# Patient Record
Sex: FEMALE | Race: WHITE | HISPANIC OR LATINO | Employment: UNEMPLOYED
[De-identification: names, ages, dates, MRNs, and addresses within clinical notes are randomized per-mention and may not be internally consistent; named-entity substitution may affect disease eponyms.]

---

## 2018-03-11 ENCOUNTER — RECORDS - HEALTHEAST (OUTPATIENT)
Dept: ADMINISTRATIVE | Facility: OTHER | Age: 33
End: 2018-03-11

## 2019-03-11 LAB
ABORH_EXT (HISTORICAL CONVERSION): NORMAL
ANTIBODY_EXT (HISTORICAL CONVERSION): NEGATIVE
HBA1C MFR BLD: 4.6 % (ref 4.8–5.6)
HBSAG_EXT (HISTORICAL CONVERSION): NEGATIVE
HCV AB SERPL QL IA: NEGATIVE
HGB_EXT (HISTORICAL CONVERSION): 13
HIV_EXT: NORMAL
PLT_EXT - HISTORICAL: 275
RPR - HISTORICAL: NORMAL
RUBELLA_EXT (HISTORICAL CONVERSION): NORMAL

## 2019-03-18 ENCOUNTER — RECORDS - HEALTHEAST (OUTPATIENT)
Dept: ADMINISTRATIVE | Facility: OTHER | Age: 34
End: 2019-03-18

## 2019-03-21 ENCOUNTER — RECORDS - HEALTHEAST (OUTPATIENT)
Dept: ADMINISTRATIVE | Facility: OTHER | Age: 34
End: 2019-03-21

## 2019-03-21 LAB
CHLAMYDIA_EXT- HISTORICAL: NEGATIVE
PAP SMEAR - HIM PATIENT REPORTED: NORMAL
SPECIMEN DESCRIPTION_EXT (HISTORICAL CONVERSION): NORMAL

## 2019-04-09 ENCOUNTER — RECORDS - HEALTHEAST (OUTPATIENT)
Dept: ADMINISTRATIVE | Facility: OTHER | Age: 34
End: 2019-04-09

## 2019-04-11 ENCOUNTER — RECORDS - HEALTHEAST (OUTPATIENT)
Dept: ADMINISTRATIVE | Facility: OTHER | Age: 34
End: 2019-04-11

## 2019-04-30 ENCOUNTER — COMMUNICATION - HEALTHEAST (OUTPATIENT)
Dept: ADMINISTRATIVE | Facility: CLINIC | Age: 34
End: 2019-04-30

## 2019-05-02 ENCOUNTER — RECORDS - HEALTHEAST (OUTPATIENT)
Dept: ADMINISTRATIVE | Facility: OTHER | Age: 34
End: 2019-05-02

## 2019-05-09 ENCOUNTER — PRENATAL OFFICE VISIT - HEALTHEAST (OUTPATIENT)
Dept: MIDWIFE SERVICES | Facility: CLINIC | Age: 34
End: 2019-05-09

## 2019-05-09 DIAGNOSIS — E03.9 HYPOTHYROIDISM, UNSPECIFIED TYPE: ICD-10-CM

## 2019-05-09 DIAGNOSIS — J45.20 MILD INTERMITTENT ASTHMA, UNSPECIFIED WHETHER COMPLICATED: ICD-10-CM

## 2019-05-09 DIAGNOSIS — D27.9 TERATOMA OF OVARY, UNSPECIFIED LATERALITY: ICD-10-CM

## 2019-05-09 DIAGNOSIS — Z34.02 ENCOUNTER FOR SUPERVISION OF NORMAL FIRST PREGNANCY IN SECOND TRIMESTER: ICD-10-CM

## 2019-05-09 ASSESSMENT — MIFFLIN-ST. JEOR: SCORE: 1641.06

## 2019-05-13 ENCOUNTER — RECORDS - HEALTHEAST (OUTPATIENT)
Dept: HEALTH INFORMATION MANAGEMENT | Facility: CLINIC | Age: 34
End: 2019-05-13

## 2019-05-15 ENCOUNTER — PRENATAL OFFICE VISIT - HEALTHEAST (OUTPATIENT)
Dept: MIDWIFE SERVICES | Facility: CLINIC | Age: 34
End: 2019-05-15

## 2019-05-15 DIAGNOSIS — Z34.02 ENCOUNTER FOR SUPERVISION OF NORMAL FIRST PREGNANCY IN SECOND TRIMESTER: ICD-10-CM

## 2019-05-16 ENCOUNTER — RECORDS - HEALTHEAST (OUTPATIENT)
Dept: HEALTH INFORMATION MANAGEMENT | Facility: CLINIC | Age: 34
End: 2019-05-16

## 2019-05-22 ENCOUNTER — PRENATAL OFFICE VISIT - HEALTHEAST (OUTPATIENT)
Dept: MIDWIFE SERVICES | Facility: CLINIC | Age: 34
End: 2019-05-22

## 2019-05-22 ENCOUNTER — AMBULATORY - HEALTHEAST (OUTPATIENT)
Dept: MIDWIFE SERVICES | Facility: CLINIC | Age: 34
End: 2019-05-22

## 2019-05-22 DIAGNOSIS — E03.9 HYPOTHYROIDISM, UNSPECIFIED TYPE: ICD-10-CM

## 2019-05-22 DIAGNOSIS — Z34.02 ENCOUNTER FOR SUPERVISION OF NORMAL FIRST PREGNANCY IN SECOND TRIMESTER: ICD-10-CM

## 2019-05-22 DIAGNOSIS — Z13.79 GENETIC SCREENING: ICD-10-CM

## 2019-05-22 LAB
T4 FREE SERPL-MCNC: 0.8 NG/DL (ref 0.7–1.8)
TSH SERPL DL<=0.005 MIU/L-ACNC: 5.4 UIU/ML (ref 0.3–5)

## 2019-05-22 ASSESSMENT — MIFFLIN-ST. JEOR: SCORE: 1677.34

## 2019-05-23 LAB
AFP MOM: 0.57 MOM
ALPHA FETO PROTEIN: 16.5 NG/ML
CALCULATED AGE AT EDD: NORMAL
COLLECTION DATE: NORMAL
COLLECTION METHOD: NORMAL
CURRENT CIGARETTE SMOKING STATUS: NORMAL
DOWN SYNDROME MATERNAL AGE RISK: NORMAL
DOWN SYNDROME SCREEN RISK ESTIMATE: NORMAL
EDD BY LMP: NORMAL
GA ON COLLECTION BY DATES: NORMAL WK,D
GA USED IN RISK ESTIMATE: NORMAL
GENERAL TEST INFORMATION: NORMAL
HCG, TOTAL MOM: 1.08 MOM
HCG, TOTAL: 29.9 IU/ML
INHIBIN MOM: 1.07 MOM
INHIBIN: 151 PG/ML
INITIAL OR REPEAT TESTING: NORMAL
INSULIN DIABETIC: NO
INTERPRETATION: NORMAL
IVF PREGNANCY: NO
LEAD BLD-MCNC: NORMAL UG/DL
LEAD RETEST: NO
Lab: NORMAL
NEURAL TUBE DEFECT RISK ESTIMATE: NORMAL
NUMBER OF CHORIONS: NORMAL
NUMBER OF FETUSES:: 1
PATIENT OR FATHER OF BABY HAS A NTD: NO
PATIENT WEIGHT: 205 LBS
PHYSICIAN PHONE NUMBER: NORMAL
PREV DOWN (T21) / TRISOMY PREGNANCY: NO
PREV PREGNANCY W/ NEURAL TUBE DEFECT: NO
PT DATE OF BIRTH: NORMAL
RACE OF MOTHER: NORMAL
RECOMMENDED FOLLOW UP: NORMAL
TRISOMY 18 SCREEN RISK ESTIMATE: NORMAL
UE3 MOM: 0.6 MOM
UE3: 0.48 NG/ML

## 2019-05-24 ENCOUNTER — COMMUNICATION - HEALTHEAST (OUTPATIENT)
Dept: OBGYN | Facility: HOSPITAL | Age: 34
End: 2019-05-24

## 2019-05-24 ENCOUNTER — AMBULATORY - HEALTHEAST (OUTPATIENT)
Dept: OBGYN | Facility: HOSPITAL | Age: 34
End: 2019-05-24

## 2019-05-24 LAB — LEAD BLDV-MCNC: <2 UG/DL (ref 0–4.9)

## 2019-06-19 ENCOUNTER — PRENATAL OFFICE VISIT - HEALTHEAST (OUTPATIENT)
Dept: MIDWIFE SERVICES | Facility: CLINIC | Age: 34
End: 2019-06-19

## 2019-06-19 ENCOUNTER — AMBULATORY - HEALTHEAST (OUTPATIENT)
Dept: OBGYN | Facility: CLINIC | Age: 34
End: 2019-06-19

## 2019-06-19 DIAGNOSIS — E03.9 HYPOTHYROIDISM, UNSPECIFIED TYPE: ICD-10-CM

## 2019-06-19 DIAGNOSIS — Z34.02 ENCOUNTER FOR SUPERVISION OF NORMAL FIRST PREGNANCY IN SECOND TRIMESTER: ICD-10-CM

## 2019-06-19 DIAGNOSIS — O28.3 ABNORMAL FETAL ULTRASOUND: ICD-10-CM

## 2019-06-19 DIAGNOSIS — D27.9 TERATOMA OF OVARY, UNSPECIFIED LATERALITY: ICD-10-CM

## 2019-06-19 DIAGNOSIS — Z13.79 GENETIC SCREENING: ICD-10-CM

## 2019-06-19 DIAGNOSIS — O26.00 EXCESSIVE WEIGHT GAIN AFFECTING PREGNANCY: ICD-10-CM

## 2019-06-19 ASSESSMENT — MIFFLIN-ST. JEOR: SCORE: 1713.63

## 2019-06-24 ENCOUNTER — AMBULATORY - HEALTHEAST (OUTPATIENT)
Dept: LAB | Facility: CLINIC | Age: 34
End: 2019-06-24

## 2019-06-24 ENCOUNTER — COMMUNICATION - HEALTHEAST (OUTPATIENT)
Dept: OBGYN | Facility: CLINIC | Age: 34
End: 2019-06-24

## 2019-06-24 DIAGNOSIS — E03.9 HYPOTHYROIDISM, UNSPECIFIED TYPE: ICD-10-CM

## 2019-06-24 LAB
T4 FREE SERPL-MCNC: 0.9 NG/DL (ref 0.7–1.8)
TSH SERPL DL<=0.005 MIU/L-ACNC: 1.7 UIU/ML (ref 0.3–5)

## 2019-07-01 ENCOUNTER — COMMUNICATION - HEALTHEAST (OUTPATIENT)
Dept: MIDWIFE SERVICES | Facility: CLINIC | Age: 34
End: 2019-07-01

## 2019-07-02 ENCOUNTER — HOSPITAL ENCOUNTER (OUTPATIENT)
Dept: NUTRITION | Facility: CLINIC | Age: 34
Discharge: HOME | End: 2019-07-02
Attending: ADVANCED PRACTICE MIDWIFE

## 2019-07-02 DIAGNOSIS — O26.00 EXCESSIVE WEIGHT GAIN AFFECTING PREGNANCY: ICD-10-CM

## 2019-07-08 ENCOUNTER — AMBULATORY - HEALTHEAST (OUTPATIENT)
Dept: OBGYN | Facility: CLINIC | Age: 34
End: 2019-07-08

## 2019-07-08 ENCOUNTER — AMBULATORY - HEALTHEAST (OUTPATIENT)
Dept: MATERNAL FETAL MEDICINE | Facility: HOSPITAL | Age: 34
End: 2019-07-08

## 2019-07-08 DIAGNOSIS — O28.3 ABNORMAL FETAL ULTRASOUND: ICD-10-CM

## 2019-07-08 DIAGNOSIS — O26.90 PREGNANCY, ANTEPARTUM, COMPLICATIONS: ICD-10-CM

## 2019-07-10 ENCOUNTER — OFFICE VISIT - HEALTHEAST (OUTPATIENT)
Dept: MATERNAL FETAL MEDICINE | Facility: HOSPITAL | Age: 34
End: 2019-07-10

## 2019-07-10 ENCOUNTER — RECORDS - HEALTHEAST (OUTPATIENT)
Dept: ULTRASOUND IMAGING | Facility: HOSPITAL | Age: 34
End: 2019-07-10

## 2019-07-10 ENCOUNTER — RECORDS - HEALTHEAST (OUTPATIENT)
Dept: ADMINISTRATIVE | Facility: OTHER | Age: 34
End: 2019-07-10

## 2019-07-10 DIAGNOSIS — O26.90 PREGNANCY RELATED CONDITIONS, UNSPECIFIED, UNSPECIFIED TRIMESTER: ICD-10-CM

## 2019-07-10 DIAGNOSIS — O35.9XX0 SUSPECTED FETAL ABNORMALITY AFFECTING MANAGEMENT OF MOTHER, SINGLE OR UNSPECIFIED FETUS: ICD-10-CM

## 2019-07-12 ENCOUNTER — AMBULATORY - HEALTHEAST (OUTPATIENT)
Dept: MIDWIFE SERVICES | Facility: CLINIC | Age: 34
End: 2019-07-12

## 2019-07-18 ENCOUNTER — PRENATAL OFFICE VISIT - HEALTHEAST (OUTPATIENT)
Dept: MIDWIFE SERVICES | Facility: CLINIC | Age: 34
End: 2019-07-18

## 2019-07-18 DIAGNOSIS — O26.00 EXCESSIVE WEIGHT GAIN AFFECTING PREGNANCY: ICD-10-CM

## 2019-07-18 DIAGNOSIS — O28.3 ABNORMAL FETAL ULTRASOUND: ICD-10-CM

## 2019-07-18 DIAGNOSIS — Z34.02 ENCOUNTER FOR SUPERVISION OF NORMAL FIRST PREGNANCY IN SECOND TRIMESTER: ICD-10-CM

## 2019-07-18 ASSESSMENT — MIFFLIN-ST. JEOR: SCORE: 1754.46

## 2019-07-30 ENCOUNTER — OFFICE VISIT - HEALTHEAST (OUTPATIENT)
Dept: FAMILY MEDICINE | Facility: CLINIC | Age: 34
End: 2019-07-30

## 2019-07-30 ENCOUNTER — COMMUNICATION - HEALTHEAST (OUTPATIENT)
Dept: OBGYN | Facility: CLINIC | Age: 34
End: 2019-07-30

## 2019-07-30 DIAGNOSIS — L98.9 SKIN LESION: ICD-10-CM

## 2019-07-30 ASSESSMENT — MIFFLIN-ST. JEOR: SCORE: 1773.96

## 2019-08-07 ENCOUNTER — PRENATAL OFFICE VISIT - HEALTHEAST (OUTPATIENT)
Dept: MIDWIFE SERVICES | Facility: CLINIC | Age: 34
End: 2019-08-07

## 2019-08-07 DIAGNOSIS — E03.9 HYPOTHYROIDISM, UNSPECIFIED TYPE: ICD-10-CM

## 2019-08-07 DIAGNOSIS — Z34.02 ENCOUNTER FOR SUPERVISION OF NORMAL FIRST PREGNANCY IN SECOND TRIMESTER: ICD-10-CM

## 2019-08-07 LAB
FASTING STATUS PATIENT QL REPORTED: YES
GLUCOSE 1H P 50 G GLC PO SERPL-MCNC: 85 MG/DL (ref 70–139)
HGB BLD-MCNC: 12.8 G/DL (ref 12–16)
TSH SERPL DL<=0.005 MIU/L-ACNC: 1.81 UIU/ML (ref 0.3–5)

## 2019-08-07 ASSESSMENT — MIFFLIN-ST. JEOR: SCORE: 1781.67

## 2019-08-08 LAB — T PALLIDUM AB SER QL: NEGATIVE

## 2019-08-15 ENCOUNTER — COMMUNICATION - HEALTHEAST (OUTPATIENT)
Dept: OBGYN | Facility: HOSPITAL | Age: 34
End: 2019-08-15

## 2019-09-05 ENCOUNTER — PRENATAL OFFICE VISIT - HEALTHEAST (OUTPATIENT)
Dept: MIDWIFE SERVICES | Facility: CLINIC | Age: 34
End: 2019-09-05

## 2019-09-05 DIAGNOSIS — Z34.03 ENCOUNTER FOR SUPERVISION OF NORMAL FIRST PREGNANCY IN THIRD TRIMESTER: ICD-10-CM

## 2019-09-05 DIAGNOSIS — Z23 NEED FOR INFLUENZA VACCINATION: ICD-10-CM

## 2019-09-05 DIAGNOSIS — O26.899 CARPAL TUNNEL SYNDROME DURING PREGNANCY: ICD-10-CM

## 2019-09-05 DIAGNOSIS — G56.00 CARPAL TUNNEL SYNDROME DURING PREGNANCY: ICD-10-CM

## 2019-09-05 ASSESSMENT — ANXIETY QUESTIONNAIRES
3. WORRYING TOO MUCH ABOUT DIFFERENT THINGS: SEVERAL DAYS
5. BEING SO RESTLESS THAT IT IS HARD TO SIT STILL: NOT AT ALL
2. NOT BEING ABLE TO STOP OR CONTROL WORRYING: SEVERAL DAYS
4. TROUBLE RELAXING: NOT AT ALL
GAD7 TOTAL SCORE: 5
7. FEELING AFRAID AS IF SOMETHING AWFUL MIGHT HAPPEN: SEVERAL DAYS
6. BECOMING EASILY ANNOYED OR IRRITABLE: MORE THAN HALF THE DAYS
IF YOU CHECKED OFF ANY PROBLEMS ON THIS QUESTIONNAIRE, HOW DIFFICULT HAVE THESE PROBLEMS MADE IT FOR YOU TO DO YOUR WORK, TAKE CARE OF THINGS AT HOME, OR GET ALONG WITH OTHER PEOPLE: SOMEWHAT DIFFICULT
1. FEELING NERVOUS, ANXIOUS, OR ON EDGE: NOT AT ALL

## 2019-09-05 ASSESSMENT — EDINBURGH POSTNATAL DEPRESSION SCALE (EPDS): TOTAL SCORE: 8

## 2019-09-05 ASSESSMENT — MIFFLIN-ST. JEOR: SCORE: 1813.42

## 2019-09-16 ENCOUNTER — AMBULATORY - HEALTHEAST (OUTPATIENT)
Dept: MIDWIFE SERVICES | Facility: CLINIC | Age: 34
End: 2019-09-16

## 2019-09-16 DIAGNOSIS — O26.899 CARPAL TUNNEL SYNDROME DURING PREGNANCY: ICD-10-CM

## 2019-09-16 DIAGNOSIS — G56.00 CARPAL TUNNEL SYNDROME DURING PREGNANCY: ICD-10-CM

## 2019-09-19 ENCOUNTER — PRENATAL OFFICE VISIT - HEALTHEAST (OUTPATIENT)
Dept: MIDWIFE SERVICES | Facility: CLINIC | Age: 34
End: 2019-09-19

## 2019-09-19 DIAGNOSIS — Z34.03 ENCOUNTER FOR SUPERVISION OF NORMAL FIRST PREGNANCY IN THIRD TRIMESTER: ICD-10-CM

## 2019-09-19 DIAGNOSIS — O26.00 EXCESSIVE WEIGHT GAIN AFFECTING PREGNANCY: ICD-10-CM

## 2019-09-19 ASSESSMENT — MIFFLIN-ST. JEOR: SCORE: 1827.03

## 2019-09-26 ENCOUNTER — OFFICE VISIT - HEALTHEAST (OUTPATIENT)
Dept: OCCUPATIONAL THERAPY | Facility: REHABILITATION | Age: 34
End: 2019-09-26

## 2019-09-26 ENCOUNTER — COMMUNICATION - HEALTHEAST (OUTPATIENT)
Dept: OBGYN | Facility: CLINIC | Age: 34
End: 2019-09-26

## 2019-09-26 DIAGNOSIS — M79.89 INTERMITTENT PAIN AND SWELLING OF HAND: ICD-10-CM

## 2019-09-26 DIAGNOSIS — G56.03 BILATERAL CARPAL TUNNEL SYNDROME: ICD-10-CM

## 2019-09-26 DIAGNOSIS — M79.643 INTERMITTENT PAIN AND SWELLING OF HAND: ICD-10-CM

## 2019-09-26 DIAGNOSIS — Z78.9 DECREASED ACTIVITIES OF DAILY LIVING (ADL): ICD-10-CM

## 2019-10-03 ENCOUNTER — PRENATAL OFFICE VISIT - HEALTHEAST (OUTPATIENT)
Dept: MIDWIFE SERVICES | Facility: CLINIC | Age: 34
End: 2019-10-03

## 2019-10-03 DIAGNOSIS — Z34.03 ENCOUNTER FOR SUPERVISION OF NORMAL FIRST PREGNANCY IN THIRD TRIMESTER: ICD-10-CM

## 2019-10-03 DIAGNOSIS — O26.899 CARPAL TUNNEL SYNDROME DURING PREGNANCY: ICD-10-CM

## 2019-10-03 DIAGNOSIS — O26.00 EXCESSIVE WEIGHT GAIN AFFECTING PREGNANCY: ICD-10-CM

## 2019-10-03 DIAGNOSIS — G56.00 CARPAL TUNNEL SYNDROME DURING PREGNANCY: ICD-10-CM

## 2019-10-03 ASSESSMENT — MIFFLIN-ST. JEOR: SCORE: 1845.17

## 2019-10-03 ASSESSMENT — EDINBURGH POSTNATAL DEPRESSION SCALE (EPDS): TOTAL SCORE: 4

## 2019-10-10 ENCOUNTER — AMBULATORY - HEALTHEAST (OUTPATIENT)
Dept: MIDWIFE SERVICES | Facility: CLINIC | Age: 34
End: 2019-10-10

## 2019-10-10 ENCOUNTER — PRENATAL OFFICE VISIT - HEALTHEAST (OUTPATIENT)
Dept: MIDWIFE SERVICES | Facility: CLINIC | Age: 34
End: 2019-10-10

## 2019-10-10 DIAGNOSIS — O26.899 CARPAL TUNNEL SYNDROME DURING PREGNANCY: ICD-10-CM

## 2019-10-10 DIAGNOSIS — O26.00 EXCESSIVE WEIGHT GAIN AFFECTING PREGNANCY: ICD-10-CM

## 2019-10-10 DIAGNOSIS — Z34.03 ENCOUNTER FOR SUPERVISION OF NORMAL FIRST PREGNANCY IN THIRD TRIMESTER: ICD-10-CM

## 2019-10-10 DIAGNOSIS — O32.2XX0 TRANSVERSE LIE OF FETUS, SINGLE OR UNSPECIFIED FETUS: ICD-10-CM

## 2019-10-10 DIAGNOSIS — L73.2 HIDRADENITIS SUPPURATIVA: ICD-10-CM

## 2019-10-10 DIAGNOSIS — G56.00 CARPAL TUNNEL SYNDROME DURING PREGNANCY: ICD-10-CM

## 2019-10-10 DIAGNOSIS — E03.9 ACQUIRED HYPOTHYROIDISM: ICD-10-CM

## 2019-10-10 LAB
HGB BLD-MCNC: 12 G/DL (ref 12–16)
T3FREE SERPL-MCNC: 3.1 PG/ML (ref 1.9–3.9)
T4 FREE SERPL-MCNC: 0.8 NG/DL (ref 0.7–1.8)
TSH SERPL DL<=0.005 MIU/L-ACNC: 1.65 UIU/ML (ref 0.3–5)

## 2019-10-10 ASSESSMENT — MIFFLIN-ST. JEOR: SCORE: 1871.6

## 2019-10-12 LAB
ALLERGIC TO PENICILLIN: NO
GP B STREP DNA SPEC QL NAA+PROBE: NEGATIVE

## 2019-10-14 ENCOUNTER — COMMUNICATION - HEALTHEAST (OUTPATIENT)
Dept: MIDWIFE SERVICES | Facility: CLINIC | Age: 34
End: 2019-10-14

## 2019-10-17 ENCOUNTER — COMMUNICATION - HEALTHEAST (OUTPATIENT)
Dept: MIDWIFE SERVICES | Facility: CLINIC | Age: 34
End: 2019-10-17

## 2019-10-17 ENCOUNTER — AMBULATORY - HEALTHEAST (OUTPATIENT)
Dept: OBGYN | Facility: CLINIC | Age: 34
End: 2019-10-17

## 2019-10-17 ENCOUNTER — PRENATAL OFFICE VISIT - HEALTHEAST (OUTPATIENT)
Dept: MIDWIFE SERVICES | Facility: CLINIC | Age: 34
End: 2019-10-17

## 2019-10-17 DIAGNOSIS — O32.0XX0 UNSTABLE FETAL LIE, SINGLE OR UNSPECIFIED FETUS: ICD-10-CM

## 2019-10-17 ASSESSMENT — MIFFLIN-ST. JEOR: SCORE: 1886

## 2019-10-21 ENCOUNTER — COMMUNICATION - HEALTHEAST (OUTPATIENT)
Dept: OBGYN | Facility: CLINIC | Age: 34
End: 2019-10-21

## 2019-10-24 ENCOUNTER — PRENATAL OFFICE VISIT - HEALTHEAST (OUTPATIENT)
Dept: MIDWIFE SERVICES | Facility: CLINIC | Age: 34
End: 2019-10-24

## 2019-10-24 DIAGNOSIS — O26.00 EXCESSIVE WEIGHT GAIN AFFECTING PREGNANCY: ICD-10-CM

## 2019-10-24 DIAGNOSIS — Z34.03 ENCOUNTER FOR SUPERVISION OF NORMAL FIRST PREGNANCY IN THIRD TRIMESTER: ICD-10-CM

## 2019-10-24 DIAGNOSIS — L73.2 HIDRADENITIS SUPPURATIVA: ICD-10-CM

## 2019-10-24 ASSESSMENT — MIFFLIN-ST. JEOR: SCORE: 1890.53

## 2019-10-25 ENCOUNTER — COMMUNICATION - HEALTHEAST (OUTPATIENT)
Dept: MIDWIFE SERVICES | Facility: CLINIC | Age: 34
End: 2019-10-25

## 2019-10-26 ENCOUNTER — AMBULATORY - HEALTHEAST (OUTPATIENT)
Dept: OBGYN | Facility: CLINIC | Age: 34
End: 2019-10-26

## 2019-10-26 DIAGNOSIS — Z34.03 ENCOUNTER FOR SUPERVISION OF NORMAL FIRST PREGNANCY IN THIRD TRIMESTER: ICD-10-CM

## 2019-10-26 DIAGNOSIS — O26.00 EXCESSIVE WEIGHT GAIN AFFECTING PREGNANCY: ICD-10-CM

## 2019-11-01 ENCOUNTER — ANESTHESIA - HEALTHEAST (OUTPATIENT)
Dept: OBGYN | Facility: CLINIC | Age: 34
End: 2019-11-01

## 2019-11-03 ENCOUNTER — HOME CARE/HOSPICE - HEALTHEAST (OUTPATIENT)
Dept: HOME HEALTH SERVICES | Facility: HOME HEALTH | Age: 34
End: 2019-11-03

## 2019-11-05 ENCOUNTER — HOME CARE/HOSPICE - HEALTHEAST (OUTPATIENT)
Dept: HOME HEALTH SERVICES | Facility: HOME HEALTH | Age: 34
End: 2019-11-05

## 2019-11-08 ENCOUNTER — COMMUNICATION - HEALTHEAST (OUTPATIENT)
Dept: MIDWIFE SERVICES | Facility: CLINIC | Age: 34
End: 2019-11-08

## 2019-11-12 ENCOUNTER — APPOINTMENT (OUTPATIENT)
Dept: ULTRASOUND IMAGING | Facility: CLINIC | Age: 34
End: 2019-11-12
Attending: EMERGENCY MEDICINE
Payer: COMMERCIAL

## 2019-11-12 ENCOUNTER — HOSPITAL ENCOUNTER (EMERGENCY)
Facility: CLINIC | Age: 34
Discharge: HOME | End: 2019-11-12
Attending: EMERGENCY MEDICINE | Admitting: EMERGENCY MEDICINE
Payer: COMMERCIAL

## 2019-11-12 VITALS
OXYGEN SATURATION: 99 % | TEMPERATURE: 98.8 F | SYSTOLIC BLOOD PRESSURE: 109 MMHG | HEIGHT: 69 IN | HEART RATE: 73 BPM | DIASTOLIC BLOOD PRESSURE: 64 MMHG | RESPIRATION RATE: 16 BRPM

## 2019-11-12 DIAGNOSIS — R10.2 PELVIC PAIN IN FEMALE: ICD-10-CM

## 2019-11-12 LAB
ABO + RH BLD: NORMAL
ABO + RH BLD: NORMAL
ALBUMIN SERPL-MCNC: 3 G/DL (ref 3.4–5)
ALP SERPL-CCNC: 108 U/L (ref 40–150)
ALT SERPL W P-5'-P-CCNC: 35 U/L (ref 0–50)
ANION GAP SERPL CALCULATED.3IONS-SCNC: 6 MMOL/L (ref 3–14)
AST SERPL W P-5'-P-CCNC: 20 U/L (ref 0–45)
B-HCG SERPL-ACNC: 11 IU/L (ref 0–5)
BASOPHILS # BLD AUTO: 0 10E9/L (ref 0–0.2)
BASOPHILS NFR BLD AUTO: 0.3 %
BILIRUB SERPL-MCNC: 0.3 MG/DL (ref 0.2–1.3)
BLD GP AB SCN SERPL QL: NORMAL
BLOOD BANK CMNT PATIENT-IMP: NORMAL
BUN SERPL-MCNC: 13 MG/DL (ref 7–30)
CALCIUM SERPL-MCNC: 8.9 MG/DL (ref 8.5–10.1)
CHLORIDE SERPL-SCNC: 107 MMOL/L (ref 94–109)
CO2 SERPL-SCNC: 26 MMOL/L (ref 20–32)
CREAT SERPL-MCNC: 0.77 MG/DL (ref 0.52–1.04)
DIFFERENTIAL METHOD BLD: ABNORMAL
EOSINOPHIL # BLD AUTO: 0.2 10E9/L (ref 0–0.7)
EOSINOPHIL NFR BLD AUTO: 1.5 %
ERYTHROCYTE [DISTWIDTH] IN BLOOD BY AUTOMATED COUNT: 13 % (ref 10–15)
GFR SERPL CREATININE-BSD FRML MDRD: >90 ML/MIN/{1.73_M2}
GLUCOSE SERPL-MCNC: 84 MG/DL (ref 70–99)
HCT VFR BLD AUTO: 40.6 % (ref 35–47)
HGB BLD-MCNC: 13.3 G/DL (ref 11.7–15.7)
IMM GRANULOCYTES # BLD: 0.1 10E9/L (ref 0–0.4)
IMM GRANULOCYTES NFR BLD: 0.6 %
LIPASE SERPL-CCNC: 116 U/L (ref 73–393)
LYMPHOCYTES # BLD AUTO: 1.4 10E9/L (ref 0.8–5.3)
LYMPHOCYTES NFR BLD AUTO: 9.1 %
MCH RBC QN AUTO: 28.9 PG (ref 26.5–33)
MCHC RBC AUTO-ENTMCNC: 32.8 G/DL (ref 31.5–36.5)
MCV RBC AUTO: 88 FL (ref 78–100)
MONOCYTES # BLD AUTO: 0.6 10E9/L (ref 0–1.3)
MONOCYTES NFR BLD AUTO: 4 %
NEUTROPHILS # BLD AUTO: 12.8 10E9/L (ref 1.6–8.3)
NEUTROPHILS NFR BLD AUTO: 84.5 %
NRBC # BLD AUTO: 0 10*3/UL
NRBC BLD AUTO-RTO: 0 /100
PLATELET # BLD AUTO: 326 10E9/L (ref 150–450)
POTASSIUM SERPL-SCNC: 3.7 MMOL/L (ref 3.4–5.3)
PROT SERPL-MCNC: 7.1 G/DL (ref 6.8–8.8)
RBC # BLD AUTO: 4.6 10E12/L (ref 3.8–5.2)
SODIUM SERPL-SCNC: 139 MMOL/L (ref 133–144)
SPECIMEN EXP DATE BLD: NORMAL
WBC # BLD AUTO: 15.1 10E9/L (ref 4–11)

## 2019-11-12 PROCEDURE — 99284 EMERGENCY DEPT VISIT MOD MDM: CPT | Mod: Z6 | Performed by: EMERGENCY MEDICINE

## 2019-11-12 PROCEDURE — 25000132 ZZH RX MED GY IP 250 OP 250 PS 637: Performed by: EMERGENCY MEDICINE

## 2019-11-12 PROCEDURE — 93976 VASCULAR STUDY: CPT

## 2019-11-12 PROCEDURE — 99284 EMERGENCY DEPT VISIT MOD MDM: CPT | Mod: 25

## 2019-11-12 PROCEDURE — 80053 COMPREHEN METABOLIC PANEL: CPT | Performed by: EMERGENCY MEDICINE

## 2019-11-12 PROCEDURE — 84702 CHORIONIC GONADOTROPIN TEST: CPT | Performed by: EMERGENCY MEDICINE

## 2019-11-12 PROCEDURE — 86850 RBC ANTIBODY SCREEN: CPT | Performed by: EMERGENCY MEDICINE

## 2019-11-12 PROCEDURE — 83690 ASSAY OF LIPASE: CPT | Performed by: EMERGENCY MEDICINE

## 2019-11-12 PROCEDURE — 86900 BLOOD TYPING SEROLOGIC ABO: CPT | Performed by: EMERGENCY MEDICINE

## 2019-11-12 PROCEDURE — 85025 COMPLETE CBC W/AUTO DIFF WBC: CPT | Performed by: EMERGENCY MEDICINE

## 2019-11-12 PROCEDURE — 86901 BLOOD TYPING SEROLOGIC RH(D): CPT | Performed by: EMERGENCY MEDICINE

## 2019-11-12 RX ORDER — ACETAMINOPHEN 325 MG/1
650 TABLET ORAL ONCE
Status: COMPLETED | OUTPATIENT
Start: 2019-11-12 | End: 2019-11-12

## 2019-11-12 RX ORDER — LEVOTHYROXINE SODIUM 75 UG/1
75 TABLET ORAL DAILY
COMMUNITY
End: 2021-10-11

## 2019-11-12 RX ADMIN — ACETAMINOPHEN 650 MG: 325 TABLET, FILM COATED ORAL at 15:43

## 2019-11-12 ASSESSMENT — ENCOUNTER SYMPTOMS
ADENOPATHY: 0
HEADACHES: 0
ARTHRALGIAS: 0
CONFUSION: 0
FEVER: 0
NECK STIFFNESS: 0
NAUSEA: 1
VOMITING: 0
CHILLS: 0
LIGHT-HEADEDNESS: 0
COLOR CHANGE: 0
BRUISES/BLEEDS EASILY: 0
DIFFICULTY URINATING: 0
POLYDIPSIA: 0
SHORTNESS OF BREATH: 0
EYE REDNESS: 0
ABDOMINAL PAIN: 0
DYSURIA: 0

## 2019-11-12 NOTE — ED PROVIDER NOTES
[place] EMERGENCY DEPARTMENT ([facility])  19    History     Chief Complaint   Patient presents with     Abdominal Pain     The history is provided by the patient and medical records.     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 Patient reports that she was on her way to her 2-week checkup this morning when she began getting intense cramping sensation in her lower abdomen. Patient notes she was unable to stand due to her pain.  Pain was diffuse, constant, sharp, cramping, moderate, nonradiating, nothing made it better or worse.  Patient reports that initially after her pregnancy she had not been clotting. Patient does endorse that over the previous few days she has been passing small clots and has had heavier bleeding. Patient reports taking 600 mg of ibuprofen around 7:30 AM when her symptoms began . Denies foul smelling discharge. Denies fevers or vomiting. Does state her pain has subsided somewhat since laying down in bed. Notes her pregnancy was complicated by HTN at the time of her . Denies any other symptoms since her delivery. Patient is currently breastfeeding.    I have reviewed the Medications, Allergies, Past Medical and Surgical History, and Social History in the [employer] system.  PAST MEDICAL HISTORY:   Past Medical History:   Diagnosis Date     Thyroid disease        PAST SURGICAL HISTORY:   Past Surgical History:   Procedure Laterality Date     APPENDECTOMY       GYN SURGERY         FAMILY HISTORY: No family history on file.    SOCIAL HISTORY:   Social History     Tobacco Use     Smoking status: Not on file   Substance Use Topics     Alcohol use: Not on file       Discharge Medication List as of 2019  3:29 PM      CONTINUE these  "medications which have NOT CHANGED    Details   levothyroxine (SYNTHROID/LEVOTHROID) 75 MCG tablet Take 75 mcg by mouth daily, Historical              No Known Allergies     Review of Systems   Constitutional: Negative for chills and fever.   HENT: Negative for congestion.    Eyes: Negative for redness.   Respiratory: Negative for shortness of breath.    Cardiovascular: Negative for chest pain.   Gastrointestinal: Positive for nausea. Negative for abdominal pain and vomiting.   Endocrine: Negative for polydipsia and polyuria.   Genitourinary: Positive for pelvic pain and vaginal bleeding. Negative for decreased urine volume, difficulty urinating, dysuria, urgency, vaginal discharge and vaginal pain.   Musculoskeletal: Negative for arthralgias and neck stiffness.   Skin: Negative for color change.   Allergic/Immunologic: Negative for immunocompromised state.   Neurological: Negative for light-headedness and headaches.   Hematological: Negative for adenopathy. Does not bruise/bleed easily.   Psychiatric/Behavioral: Negative for confusion.   All other systems reviewed and are negative.      Physical Exam   BP: 134/75  Pulse: 73  Heart Rate: 104  Temp: 98.8  F (37.1  C)  Resp: 16  Height: 175.3 cm (5' 9\")  SpO2: 100 %      Physical Exam  Vitals signs and nursing note reviewed. Exam conducted with a chaperone present.   Constitutional:       General: She is not in acute distress.     Appearance: Normal appearance. She is not diaphoretic.   HENT:      Head: Normocephalic and atraumatic.      Mouth/Throat:      Mouth: Mucous membranes are moist.      Pharynx: Oropharynx is clear. No oropharyngeal exudate.   Eyes:      General: No scleral icterus.     Extraocular Movements: Extraocular movements intact.      Conjunctiva/sclera: Conjunctivae normal.   Neck:      Musculoskeletal: Normal range of motion.   Cardiovascular:      Rate and Rhythm: Normal rate.      Heart sounds: Normal heart sounds.   Pulmonary:      Effort: " Pulmonary effort is normal. No respiratory distress.      Breath sounds: Normal breath sounds.   Abdominal:      General: There is no distension.      Palpations: Abdomen is soft. There is no mass.      Tenderness: There is tenderness in the right lower quadrant, suprapubic area and left lower quadrant. There is no right CVA tenderness, left CVA tenderness, guarding or rebound.      Hernia: No hernia is present.   Genitourinary:     General: Normal vulva.      Exam position: Knee-chest position.      Labia:         Right: No tenderness.         Left: No tenderness.       Uretha: No prolapse or urethral pain.      Vagina: Bleeding present. No vaginal discharge or tenderness.      Cervix: Cervical bleeding present.      Adnexa: Right adnexa normal and left adnexa normal.        Right: No tenderness.          Left: No tenderness.        Comments: Small amount of blood and clots in vaginal vault which was evacuated and there was no rapid, brisk bleeding.  There was some slight oozing of blood from the cervix which was closed.  No vaginal or cervical discharge.  Musculoskeletal: Normal range of motion.         General: No tenderness.   Skin:     General: Skin is warm.      Findings: No rash.   Neurological:      General: No focal deficit present.      Mental Status: She is alert and oriented to person, place, and time.      Coordination: Coordination normal.   Psychiatric:         Mood and Affect: Mood normal.         Behavior: Behavior normal.         Thought Content: Thought content normal.         Judgement: Judgment normal.         ED Course   11:38 AM  The patient was seen and examined by Ally Gonzalez MD in Room ED31.        Procedures             Critical Care time:  none             Labs Ordered and Resulted from Time of ED Arrival Up to the Time of Departure from the ED   CBC WITH PLATELETS DIFFERENTIAL - Abnormal; Notable for the following components:       Result Value    WBC 15.1 (*)     Absolute Neutrophil  12.8 (*)     All other components within normal limits   HCG QUANTITATIVE PREGNANCY - Abnormal; Notable for the following components:    HCG Quantitative Serum 11 (*)     All other components within normal limits   COMPREHENSIVE METABOLIC PANEL - Abnormal; Notable for the following components:    Albumin 3.0 (*)     All other components within normal limits   LIPASE   ABO/RH TYPE AND SCREEN            Assessments & Plan (with Medical Decision Making)   This is a 34 year old female presenting with sudden onset pelvic pain and cramping that started several hours ago prior to arrival. Differential diagnosis: Retained products of conception, ovarian torsion, teratoma, ovarian cyst.    After thorough history and physical examination, patient appears to be in no acute distress.  She declined analgesics and antiemetics at this time.  I will obtain laboratory studies and a pelvic ultrasound for further diagnostic evaluation.  She agrees with the plan.    Laboratory studies returned with leukocytosis of 15,100.  There is no anemia, hemoglobin is normal at 13.3.  Electrolytes show no evidence of dehydration, creatinine is normal at 0.77.  LFTs and lipase are normal.  hCG is elevated at 11, however, this is not uncommon in a postpartum patient.  I reviewed her pelvic ultrasound and I've read the radiology report; right ovary was not identified, however, no obvious mass is seen in the right adnexa.  Left ovary appears to have normal blood flow, however, there is echogenic focus of 1.7 cm in the ovary that could represent a dermoid.  There is an echogenic area within the endometrial canal that is likely a hematoma, however, retained products of conception cannot be excluded.  Patient s symptoms have resolved in the emergency department.  I believe that this is likely a hematoma rather than the retained products of conception given her history and physical examination.  She is hemodynamically stable and she can be discharged to  home with outpatient OB/GYN follow-up in 1 to 2 days.  She and  her  are happy with this plan.  I advised her to return to the emergency department with any fevers, worsening of her vaginal bleeding, or return of symptoms.  She agrees to do so.  She will utilize ibuprofen or Tylenol for pain management, if needed.  At this time she is stable for discharge.      This part of the medical record was transcribed by Sky Lincoln and Jeanie Steven, Medical Scribes, from a dictation done by Ally Gonzalez MD.     I have reviewed the nursing notes.    I have reviewed the findings, diagnosis, plan and need for follow up with the patient.    Discharge Medication List as of 11/12/2019  3:29 PM          Final diagnoses:   Postpartum hemorrhage, unspecified type   Pelvic pain in female     ISky, am serving as a trained medical scribe to document services personally performed by Ally Gonzalez MD, based on the provider's statements to me.   IAlly MD, was physically present and have reviewed and verified the accuracy of this note documented by Sky Lincoln.    11/12/2019   [facility], [place], EMERGENCY DEPARTMENT     Ally Gonzalez MD  11/12/19 [id number]

## 2019-11-12 NOTE — DISCHARGE INSTRUCTIONS
Please make an appointment to follow up with Your OB/GYN Provider in 1-2 days for further evaluation and recommendations.  Please take ibuprofen or Tylenol for pain, if needed.  If your bleeding worsens and you are soaking through 1 pad per hour please come back to the emergency department.  Also, your pain is not well controlled with over-the-counter medications please come back to the emergency department.  In case of fever of 100.4  F or higher please return to the emergency department.

## 2019-11-12 NOTE — ED TRIAGE NOTES
Patient presents to triage with c/o abdominal pain. Patient states she is 1.5 weeks postpartum, developed worsening abdominal pain about an hour prior to arrival. Also reports nausea, no vomiting, and an increase in vaginal clots passed since yesterday.

## 2019-11-12 NOTE — ED AVS SNAPSHOT
[facility], [place], Emergency Department  [address] [facility] [address]  Phone:  [phone]                                    Divina MORALES See   MRN: [id number]    Department:  [facility], Emergency Department   Date of Visit:  11/12/2019           After Visit Summary Signature Page    I have received my discharge instructions, and my questions have been answered. I have discussed any challenges I see with this plan with the nurse or doctor.    ..........................................................................................................................................  Patient/Patient Representative Signature      ..........................................................................................................................................  Patient Representative Print Name and Relationship to Patient    ..................................................               ................................................  Date                                   Time    ..........................................................................................................................................  Reviewed by Signature/Title    ...................................................              ..............................................  Date                                               Time          22EPIC Rev 08/18

## 2019-11-13 ENCOUNTER — RECORDS - HEALTHEAST (OUTPATIENT)
Dept: ADMINISTRATIVE | Facility: OTHER | Age: 34
End: 2019-11-13

## 2019-11-14 ASSESSMENT — MIFFLIN-ST. JEOR: SCORE: 1798.12

## 2019-11-15 ENCOUNTER — ANESTHESIA - HEALTHEAST (OUTPATIENT)
Dept: SURGERY | Facility: HOSPITAL | Age: 34
End: 2019-11-15

## 2019-11-15 ENCOUNTER — SURGERY - HEALTHEAST (OUTPATIENT)
Dept: SURGERY | Facility: HOSPITAL | Age: 34
End: 2019-11-15

## 2019-11-25 ENCOUNTER — RECORDS - HEALTHEAST (OUTPATIENT)
Dept: ADMINISTRATIVE | Facility: OTHER | Age: 34
End: 2019-11-25

## 2019-12-30 ENCOUNTER — OFFICE VISIT - HEALTHEAST (OUTPATIENT)
Dept: MIDWIFE SERVICES | Facility: CLINIC | Age: 34
End: 2019-12-30

## 2019-12-30 DIAGNOSIS — Z12.4 PAP SMEAR FOR CERVICAL CANCER SCREENING: ICD-10-CM

## 2019-12-30 ASSESSMENT — EDINBURGH POSTNATAL DEPRESSION SCALE (EPDS): TOTAL SCORE: 4

## 2019-12-30 ASSESSMENT — MIFFLIN-ST. JEOR: SCORE: 1779.97

## 2019-12-30 ASSESSMENT — ANXIETY QUESTIONNAIRES
3. WORRYING TOO MUCH ABOUT DIFFERENT THINGS: NOT AT ALL
5. BEING SO RESTLESS THAT IT IS HARD TO SIT STILL: NOT AT ALL
7. FEELING AFRAID AS IF SOMETHING AWFUL MIGHT HAPPEN: SEVERAL DAYS
2. NOT BEING ABLE TO STOP OR CONTROL WORRYING: NOT AT ALL
IF YOU CHECKED OFF ANY PROBLEMS ON THIS QUESTIONNAIRE, HOW DIFFICULT HAVE THESE PROBLEMS MADE IT FOR YOU TO DO YOUR WORK, TAKE CARE OF THINGS AT HOME, OR GET ALONG WITH OTHER PEOPLE: SOMEWHAT DIFFICULT
GAD7 TOTAL SCORE: 4
4. TROUBLE RELAXING: SEVERAL DAYS
6. BECOMING EASILY ANNOYED OR IRRITABLE: SEVERAL DAYS
1. FEELING NERVOUS, ANXIOUS, OR ON EDGE: SEVERAL DAYS

## 2020-01-16 ENCOUNTER — OFFICE VISIT - HEALTHEAST (OUTPATIENT)
Dept: MIDWIFE SERVICES | Facility: CLINIC | Age: 35
End: 2020-01-16

## 2020-01-16 DIAGNOSIS — Z30.430 ENCOUNTER FOR INTRAUTERINE DEVICE PLACEMENT: ICD-10-CM

## 2020-01-16 DIAGNOSIS — B02.9 HERPES ZOSTER WITHOUT COMPLICATION: ICD-10-CM

## 2020-01-16 DIAGNOSIS — Z01.812 PRE-PROCEDURE LAB EXAM: ICD-10-CM

## 2020-01-16 DIAGNOSIS — R10.2 PELVIC PAIN: ICD-10-CM

## 2020-01-16 LAB — HCG UR QL: NEGATIVE

## 2020-01-16 ASSESSMENT — MIFFLIN-ST. JEOR: SCORE: 1779.97

## 2020-01-17 LAB
C TRACH DNA SPEC QL PROBE+SIG AMP: NEGATIVE
N GONORRHOEA DNA SPEC QL NAA+PROBE: NEGATIVE

## 2020-02-20 ENCOUNTER — AMBULATORY - HEALTHEAST (OUTPATIENT)
Dept: MIDWIFE SERVICES | Facility: CLINIC | Age: 35
End: 2020-02-20

## 2020-02-20 ENCOUNTER — OFFICE VISIT - HEALTHEAST (OUTPATIENT)
Dept: MIDWIFE SERVICES | Facility: CLINIC | Age: 35
End: 2020-02-20

## 2020-02-20 DIAGNOSIS — E03.8 OTHER SPECIFIED HYPOTHYROIDISM: ICD-10-CM

## 2020-02-20 DIAGNOSIS — Z30.431 IUD CHECK UP: ICD-10-CM

## 2020-02-20 LAB
T4 FREE SERPL-MCNC: 1.3 NG/DL (ref 0.7–1.8)
TSH SERPL DL<=0.005 MIU/L-ACNC: <0.01 UIU/ML (ref 0.3–5)

## 2020-02-20 ASSESSMENT — MIFFLIN-ST. JEOR: SCORE: 1766.37

## 2020-02-21 ENCOUNTER — COMMUNICATION - HEALTHEAST (OUTPATIENT)
Dept: ADMINISTRATIVE | Facility: CLINIC | Age: 35
End: 2020-02-21

## 2020-02-26 ENCOUNTER — OFFICE VISIT - HEALTHEAST (OUTPATIENT)
Dept: ENDOCRINOLOGY | Facility: CLINIC | Age: 35
End: 2020-02-26

## 2020-02-26 DIAGNOSIS — E03.8 OTHER SPECIFIED HYPOTHYROIDISM: ICD-10-CM

## 2020-02-26 ASSESSMENT — MIFFLIN-ST. JEOR: SCORE: 1776.34

## 2020-03-02 ENCOUNTER — OFFICE VISIT - HEALTHEAST (OUTPATIENT)
Dept: FAMILY MEDICINE | Facility: CLINIC | Age: 35
End: 2020-03-02

## 2020-03-02 DIAGNOSIS — M79.672 BILATERAL FOOT PAIN: ICD-10-CM

## 2020-03-02 DIAGNOSIS — E66.811 CLASS 1 OBESITY: ICD-10-CM

## 2020-03-02 DIAGNOSIS — M79.671 BILATERAL FOOT PAIN: ICD-10-CM

## 2020-03-02 DIAGNOSIS — E03.8 OTHER SPECIFIED HYPOTHYROIDISM: ICD-10-CM

## 2020-03-02 DIAGNOSIS — M25.562 PAIN IN BOTH KNEES, UNSPECIFIED CHRONICITY: ICD-10-CM

## 2020-03-02 DIAGNOSIS — M25.561 PAIN IN BOTH KNEES, UNSPECIFIED CHRONICITY: ICD-10-CM

## 2020-03-02 ASSESSMENT — MIFFLIN-ST. JEOR: SCORE: 1774.53

## 2020-03-11 ENCOUNTER — OFFICE VISIT - HEALTHEAST (OUTPATIENT)
Dept: PHYSICAL THERAPY | Facility: REHABILITATION | Age: 35
End: 2020-03-11

## 2020-03-11 DIAGNOSIS — M79.672 BILATERAL FOOT PAIN: ICD-10-CM

## 2020-03-11 DIAGNOSIS — M25.561 ACUTE BILATERAL KNEE PAIN: ICD-10-CM

## 2020-03-11 DIAGNOSIS — M79.644 BILATERAL THUMB PAIN: ICD-10-CM

## 2020-03-11 DIAGNOSIS — M79.645 BILATERAL THUMB PAIN: ICD-10-CM

## 2020-03-11 DIAGNOSIS — M25.562 ACUTE BILATERAL KNEE PAIN: ICD-10-CM

## 2020-03-11 DIAGNOSIS — M79.671 BILATERAL FOOT PAIN: ICD-10-CM

## 2020-03-21 ENCOUNTER — RECORDS - HEALTHEAST (OUTPATIENT)
Dept: ADMINISTRATIVE | Facility: OTHER | Age: 35
End: 2020-03-21

## 2020-03-24 ENCOUNTER — COMMUNICATION - HEALTHEAST (OUTPATIENT)
Dept: LAB | Facility: CLINIC | Age: 35
End: 2020-03-24

## 2020-04-02 ENCOUNTER — RECORDS - HEALTHEAST (OUTPATIENT)
Dept: ADMINISTRATIVE | Facility: OTHER | Age: 35
End: 2020-04-02

## 2020-04-21 ENCOUNTER — OFFICE VISIT - HEALTHEAST (OUTPATIENT)
Dept: FAMILY MEDICINE | Facility: CLINIC | Age: 35
End: 2020-04-21

## 2020-04-21 DIAGNOSIS — M54.50 ACUTE BILATERAL LOW BACK PAIN WITHOUT SCIATICA: ICD-10-CM

## 2020-07-16 ENCOUNTER — COMMUNICATION - HEALTHEAST (OUTPATIENT)
Dept: SCHEDULING | Facility: CLINIC | Age: 35
End: 2020-07-16

## 2020-07-16 ENCOUNTER — VIRTUAL VISIT (OUTPATIENT)
Dept: FAMILY MEDICINE | Facility: OTHER | Age: 35
End: 2020-07-16

## 2020-07-16 ENCOUNTER — AMBULATORY - HEALTHEAST (OUTPATIENT)
Dept: FAMILY MEDICINE | Facility: CLINIC | Age: 35
End: 2020-07-16

## 2020-07-16 DIAGNOSIS — Z20.822 SUSPECTED COVID-19 VIRUS INFECTION: ICD-10-CM

## 2020-07-16 NOTE — PROGRESS NOTES
"Date: 2020 13:12:27  Clinician: Golden Tafoya  Clinician NPI: [id number]  Patient: Divina See  Patient : 1985  Patient Address: [address] [place] [address] [place] [id number]  Patient Phone: [phone]  Visit Protocol: URI  Patient Summary:  Divina is a 35 year old ( : 1985 ) female who initiated a Visit for COVID-19 (Coronavirus) evaluation and screening. When asked the question \"Please sign me up to receive news, health information and promotions. \", Divina responded \"No\".    Divina states her symptoms started 1-2 days ago.   Her symptoms consist of diarrhea, rhinitis, malaise, a headache, a sore throat, and myalgia.   Symptom details     Nasal secretions: The color of her mucus is clear.    Sore throat: Divina reports having moderate throat pain (4-6 on a 10 point pain scale), does not have exudate on her tonsils, and can swallow liquids. She is not sure if the lymph nodes in her neck are enlarged. A rash has not appeared on the skin since the sore throat started.     Headache: She states the headache is moderate (4-6 on a 10 point pain scale).      Divina denies having wheezing, nausea, teeth pain, ageusia, vomiting, ear pain, chills, anosmia, facial pain or pressure, fever, cough, and nasal congestion. She also denies having recent facial or sinus surgery in the past 60 days and taking antibiotic medication in the past month. She is not experiencing dyspnea.   Precipitating events  Within the past week, Divina has not been exposed to someone with strep throat. She has not recently been exposed to someone with influenza. Divina has been in close contact with the following high risk individuals: children under the age of 5.   Pertinent COVID-19 (Coronavirus) information  In the past 14 days, Divina has not worked in a congregate living setting.   She does not work or volunteer as healthcare worker or a  and does not work or volunteer in a healthcare facility.   Divina also has not " lived in a congregate living setting in the past 14 days. She does not live with a healthcare worker.   Divina has not had a close contact with a laboratory-confirmed COVID-19 patient within 14 days of symptom onset.   Pertinent medical history  Divina does not get yeast infections when she takes antibiotics.   Divina needs a return to work/school note.   Weight: 220 lbs   Divina does not smoke or use smokeless tobacco.   She denies pregnancy and is breastfeeding. She has menstruated in the past month.   Weight: 220 lbs    MEDICATIONS: No current medications, ALLERGIES: NKDA  Clinician Response:  Dear Divina,   Your symptoms show that you may have coronavirus (COVID-19). This illness can cause fever, cough and trouble breathing. Many people get a mild case and get better on their own. Some people can get very sick.  What should I do?  We would like to test you for this virus.   1. Please call [phone] to schedule your visit. Explain that you were referred by [facility] to have a COVID-19 test. Be ready to share your Critical access hospital visit ID number.  The following will serve as your written order for this COVID Test, ordered by me, for the indication of suspected COVID [Z20.828]: The test will be ordered in [employer], our electronic health record, after you are scheduled. It will show as ordered and authorized by Jose Miguel Brown MD.  Order: COVID-19 (Coronavirus) PCR for SYMPTOMATIC testing from Critical access hospital.      2. When it's time for your COVID test:  Stay at least 6 feet away from others. (If someone will drive you to your test, stay in the backseat, as far away from the  as you can.)   Cover your mouth and nose with a mask, tissue or washcloth.  Go straight to the testing site. Don't make any stops on the way there or back.      3.Starting now: Stay home and away from others (self-isolate) until:   You've had no fever---and no medicine that reduces fever---for 3 full days (72 hours). And...   Your other symptoms have gotten  "better. For example, your cough or breathing has improved. And...   At least 10 days have passed since your symptoms started.       During this time, don't leave the house except for testing or medical care.   Stay in your own room, even for meals. Use your own bathroom if you can.   Stay away from others in your home. No hugging, kissing or shaking hands. No visitors.  Don't go to work, school or anywhere else.    Clean \"high touch\" surfaces often (doorknobs, counters, handles, etc.). Use a household cleaning spray or wipes. You'll find a full list of  on the EPA website: www.epa.gov/pesticide-registration/list-n-disinfectants-use-against-sars-cov-2.   Cover your mouth and nose with a mask, tissue or washcloth to avoid spreading germs.  Wash your hands and face often. Use soap and water.  Caregivers in these groups are at risk for severe illness due to COVID-19:  o People 65 years and older  o People who live in a nursing home or long-term care facility  o People with chronic disease (lung, heart, cancer, diabetes, kidney, liver, immunologic)  o People who have a weakened immune system, including those who:   Are in cancer treatment  Take medicine that weakens the immune system, such as corticosteroids  Had a bone marrow or organ transplant  Have an immune deficiency  Have poorly controlled HIV or AIDS  Are obese (body mass index of 40 or higher)  Smoke regularly   o Caregivers should wear gloves while washing dishes, handling laundry and cleaning bedrooms and bathrooms.  o Use caution when washing and drying laundry: Don't shake dirty laundry, and use the warmest water setting that you can.  o For more tips, go to www.cdc.gov/coronavirus/2019-ncov/downloads/10Things.pdf.    4.Sign up for [employer]. We know it's scary to hear that you might have COVID-19. We want to track your symptoms to make sure you're okay over the next 2 weeks. Please look for an email from [employer]---this is a free, online " program that we'll use to keep in touch. To sign up, follow the link in the email. Learn more at [email or web address]  How can I take care of myself?   Get lots of rest. Drink extra fluids (unless a doctor has told you not to).   Take Tylenol (acetaminophen) for fever or pain. If you have liver or kidney problems, ask your family doctor if it's okay to take Tylenol.   Adults can take either:    650 mg (two 325 mg pills) every 4 to 6 hours, or...   1,000 mg (two 500 mg pills) every 8 hours as needed.    Note: Don't take more than 3,000 mg in one day. Acetaminophen is found in many medicines (both prescribed and over-the-counter medicines). Read all labels to be sure you don't take too much.   For children, check the Tylenol bottle for the right dose. The dose is based on the child's age or weight.    If you have other health problems (like cancer, heart failure, an organ transplant or severe kidney disease): Call your specialty clinic if you don't feel better in the next 2 days.       Know when to call 911. Emergency warning signs include:    Trouble breathing or shortness of breath Pain or pressure in the chest that doesn't go away Feeling confused like you haven't felt before, or not being able to wake up Bluish-colored lips or face.  Where can I get more information?   [facility] -- About COVID-19: [email or web address]   CDC -- What to Do If You're Sick: www.cdc.gov/coronavirus/2019-ncov/about/steps-when-sick.html   CDC -- Ending Home Isolation: www.cdc.gov/coronavirus/2019-ncov/hcp/disposition-in-home-patients.html   CDC -- Caring for Someone: www.cdc.gov/coronavirus/2019-ncov/if-you-are-sick/care-for-someone.html   [facility] -- Interim Guidance for Hospital Discharge to Home: www.health.[facility].[place]/diseases/coronavirus/hcp/hospdischarge.pdf   [facility] clinical trials (COVID-19 research studies): clinicalaffairs.[facility]-clinical-trials    Below are the COVID-19  hotlines at the [place] Department of Health ([facility]). Interpreters are available.    For health questions: Call 853-474-6387 or 1-507.693.3986 (7 a.m. to 7 p.m.) For questions about schools and childcare: Call 675-125-4308 or 1-429.859.8032 (7 a.m. to 7 p.m.)    Diagnosis: Other malaise  Diagnosis ICD: R53.81

## 2020-07-17 ENCOUNTER — AMBULATORY - HEALTHEAST (OUTPATIENT)
Dept: FAMILY MEDICINE | Facility: CLINIC | Age: 35
End: 2020-07-17

## 2020-07-17 DIAGNOSIS — Z20.822 SUSPECTED COVID-19 VIRUS INFECTION: ICD-10-CM

## 2020-07-22 ENCOUNTER — OFFICE VISIT - HEALTHEAST (OUTPATIENT)
Dept: MIDWIFE SERVICES | Facility: CLINIC | Age: 35
End: 2020-07-22

## 2020-07-22 ENCOUNTER — AMBULATORY - HEALTHEAST (OUTPATIENT)
Dept: LAB | Facility: CLINIC | Age: 35
End: 2020-07-22

## 2020-07-22 ENCOUNTER — COMMUNICATION - HEALTHEAST (OUTPATIENT)
Dept: FAMILY MEDICINE | Facility: CLINIC | Age: 35
End: 2020-07-22

## 2020-07-22 DIAGNOSIS — E03.8 OTHER SPECIFIED HYPOTHYROIDISM: ICD-10-CM

## 2020-07-22 DIAGNOSIS — Z30.432 ENCOUNTER FOR IUD REMOVAL: ICD-10-CM

## 2020-07-22 DIAGNOSIS — L02.92 BOIL: ICD-10-CM

## 2020-07-22 DIAGNOSIS — L73.2 HIDRADENITIS SUPPURATIVA: ICD-10-CM

## 2020-07-22 DIAGNOSIS — R79.89 LOW TSH LEVEL: ICD-10-CM

## 2020-07-22 DIAGNOSIS — Z12.4 CERVICAL CANCER SCREENING: ICD-10-CM

## 2020-07-22 DIAGNOSIS — Z12.4 PAP SMEAR FOR CERVICAL CANCER SCREENING: ICD-10-CM

## 2020-07-22 LAB
T4 FREE SERPL-MCNC: 0.6 NG/DL (ref 0.7–1.8)
TSH SERPL DL<=0.005 MIU/L-ACNC: 22.82 UIU/ML (ref 0.3–5)

## 2020-07-22 ASSESSMENT — MIFFLIN-ST. JEOR: SCORE: 1743.69

## 2020-07-23 LAB
HPV SOURCE: NORMAL
HUMAN PAPILLOMA VIRUS 16 DNA: NEGATIVE
HUMAN PAPILLOMA VIRUS 18 DNA: NEGATIVE
HUMAN PAPILLOMA VIRUS FINAL DIAGNOSIS: NORMAL
HUMAN PAPILLOMA VIRUS OTHER HR: NEGATIVE
SPECIMEN DESCRIPTION: NORMAL

## 2020-07-27 ENCOUNTER — COMMUNICATION - HEALTHEAST (OUTPATIENT)
Dept: ADMINISTRATIVE | Facility: CLINIC | Age: 35
End: 2020-07-27

## 2020-07-27 DIAGNOSIS — E03.9 ACQUIRED HYPOTHYROIDISM: ICD-10-CM

## 2020-08-05 LAB
BKR LAB AP ABNORMAL BLEEDING: NO
BKR LAB AP BIRTH CONTROL/HORMONES: NORMAL
BKR LAB AP CERVICAL APPEARANCE: NORMAL
BKR LAB AP GYN ADEQUACY: NORMAL
BKR LAB AP GYN INTERPRETATION: NORMAL
BKR LAB AP HPV REFLEX: NORMAL
BKR LAB AP LMP: NORMAL
BKR LAB AP PATIENT STATUS: NORMAL
BKR LAB AP PREVIOUS ABNORMAL: NORMAL
BKR LAB AP PREVIOUS NORMAL: NORMAL
HIGH RISK?: YES
PATH REPORT.COMMENTS IMP SPEC: NORMAL
RESULT FLAG (HE HISTORICAL CONVERSION): NORMAL

## 2020-08-25 ENCOUNTER — AMBULATORY - HEALTHEAST (OUTPATIENT)
Dept: LAB | Facility: CLINIC | Age: 35
End: 2020-08-25

## 2020-08-25 DIAGNOSIS — E03.8 OTHER SPECIFIED HYPOTHYROIDISM: ICD-10-CM

## 2020-08-25 LAB
T4 FREE SERPL-MCNC: 1.5 NG/DL (ref 0.7–1.8)
TSH SERPL DL<=0.005 MIU/L-ACNC: 0.18 UIU/ML (ref 0.3–5)

## 2020-08-26 ENCOUNTER — OFFICE VISIT - HEALTHEAST (OUTPATIENT)
Dept: ENDOCRINOLOGY | Facility: CLINIC | Age: 35
End: 2020-08-26

## 2020-08-26 DIAGNOSIS — E03.9 ACQUIRED HYPOTHYROIDISM: ICD-10-CM

## 2020-09-29 ENCOUNTER — AMBULATORY - HEALTHEAST (OUTPATIENT)
Dept: LAB | Facility: CLINIC | Age: 35
End: 2020-09-29

## 2020-09-29 DIAGNOSIS — E03.9 ACQUIRED HYPOTHYROIDISM: ICD-10-CM

## 2020-09-29 LAB
T4 FREE SERPL-MCNC: 1.3 NG/DL (ref 0.7–1.8)
TSH SERPL DL<=0.005 MIU/L-ACNC: 0.52 UIU/ML (ref 0.3–5)

## 2020-10-05 ENCOUNTER — COMMUNICATION - HEALTHEAST (OUTPATIENT)
Dept: ADMINISTRATIVE | Facility: CLINIC | Age: 35
End: 2020-10-05

## 2020-10-05 DIAGNOSIS — E03.9 ACQUIRED HYPOTHYROIDISM: ICD-10-CM

## 2020-10-28 ENCOUNTER — COMMUNICATION - HEALTHEAST (OUTPATIENT)
Dept: ADMINISTRATIVE | Facility: CLINIC | Age: 35
End: 2020-10-28

## 2020-11-05 ENCOUNTER — COMMUNICATION - HEALTHEAST (OUTPATIENT)
Dept: ADMINISTRATIVE | Facility: CLINIC | Age: 35
End: 2020-11-05

## 2020-11-05 DIAGNOSIS — E03.9 ACQUIRED HYPOTHYROIDISM: ICD-10-CM

## 2020-11-12 ENCOUNTER — OFFICE VISIT - HEALTHEAST (OUTPATIENT)
Dept: MIDWIFE SERVICES | Facility: CLINIC | Age: 35
End: 2020-11-12

## 2020-11-12 ENCOUNTER — AMBULATORY - HEALTHEAST (OUTPATIENT)
Dept: MATERNAL FETAL MEDICINE | Facility: HOSPITAL | Age: 35
End: 2020-11-12

## 2020-11-12 DIAGNOSIS — E03.9 ACQUIRED HYPOTHYROIDISM: ICD-10-CM

## 2020-11-12 DIAGNOSIS — N92.6 MISSED MENSES: ICD-10-CM

## 2020-11-12 DIAGNOSIS — O09.521 MULTIGRAVIDA OF ADVANCED MATERNAL AGE IN FIRST TRIMESTER: ICD-10-CM

## 2020-11-12 DIAGNOSIS — Z87.59 HISTORY OF PRE-ECLAMPSIA: ICD-10-CM

## 2020-11-12 DIAGNOSIS — D27.0 DERMOID CYST OF BOTH OVARIES: ICD-10-CM

## 2020-11-12 DIAGNOSIS — O26.90 PREGNANCY, ANTEPARTUM, COMPLICATIONS: ICD-10-CM

## 2020-11-12 DIAGNOSIS — D27.1 DERMOID CYST OF BOTH OVARIES: ICD-10-CM

## 2020-11-12 ASSESSMENT — MIFFLIN-ST. JEOR: SCORE: 1730.08

## 2020-11-13 ENCOUNTER — COMMUNICATION - HEALTHEAST (OUTPATIENT)
Dept: ADMINISTRATIVE | Facility: CLINIC | Age: 35
End: 2020-11-13

## 2020-11-13 DIAGNOSIS — E03.9 ACQUIRED HYPOTHYROIDISM: ICD-10-CM

## 2020-11-17 ENCOUNTER — AMBULATORY - HEALTHEAST (OUTPATIENT)
Dept: LAB | Facility: CLINIC | Age: 35
End: 2020-11-17

## 2020-11-17 DIAGNOSIS — E03.9 ACQUIRED HYPOTHYROIDISM: ICD-10-CM

## 2020-11-17 LAB
T4 FREE SERPL-MCNC: 1 NG/DL (ref 0.7–1.8)
TSH SERPL DL<=0.005 MIU/L-ACNC: 2.81 UIU/ML (ref 0.3–5)

## 2020-11-20 ENCOUNTER — COMMUNICATION - HEALTHEAST (OUTPATIENT)
Dept: MIDWIFE SERVICES | Facility: CLINIC | Age: 35
End: 2020-11-20

## 2020-11-20 ENCOUNTER — AMBULATORY - HEALTHEAST (OUTPATIENT)
Dept: OBGYN | Facility: CLINIC | Age: 35
End: 2020-11-20

## 2020-11-20 DIAGNOSIS — O09.521 MULTIGRAVIDA OF ADVANCED MATERNAL AGE IN FIRST TRIMESTER: ICD-10-CM

## 2020-11-25 ENCOUNTER — COMMUNICATION - HEALTHEAST (OUTPATIENT)
Dept: ADMINISTRATIVE | Facility: CLINIC | Age: 35
End: 2020-11-25

## 2020-11-30 ENCOUNTER — AMBULATORY - HEALTHEAST (OUTPATIENT)
Dept: MIDWIFE SERVICES | Facility: CLINIC | Age: 35
End: 2020-11-30

## 2020-11-30 ENCOUNTER — COMMUNICATION - HEALTHEAST (OUTPATIENT)
Dept: ADMINISTRATIVE | Facility: CLINIC | Age: 35
End: 2020-11-30

## 2020-11-30 DIAGNOSIS — O09.521 MULTIGRAVIDA OF ADVANCED MATERNAL AGE IN FIRST TRIMESTER: ICD-10-CM

## 2020-12-01 ENCOUNTER — AMBULATORY - HEALTHEAST (OUTPATIENT)
Dept: MATERNAL FETAL MEDICINE | Facility: HOSPITAL | Age: 35
End: 2020-12-01

## 2020-12-01 ENCOUNTER — OFFICE VISIT - HEALTHEAST (OUTPATIENT)
Dept: ENDOCRINOLOGY | Facility: CLINIC | Age: 35
End: 2020-12-01

## 2020-12-01 DIAGNOSIS — Z33.1 PREGNANCY, INCIDENTAL: ICD-10-CM

## 2020-12-01 DIAGNOSIS — E03.9 ACQUIRED HYPOTHYROIDISM: ICD-10-CM

## 2020-12-03 ENCOUNTER — AMBULATORY - HEALTHEAST (OUTPATIENT)
Dept: MATERNAL FETAL MEDICINE | Facility: HOSPITAL | Age: 35
End: 2020-12-03

## 2020-12-04 ENCOUNTER — AMBULATORY - HEALTHEAST (OUTPATIENT)
Dept: LAB | Facility: HOSPITAL | Age: 35
End: 2020-12-04

## 2020-12-04 ENCOUNTER — OFFICE VISIT - HEALTHEAST (OUTPATIENT)
Dept: MATERNAL FETAL MEDICINE | Facility: HOSPITAL | Age: 35
End: 2020-12-04

## 2020-12-04 DIAGNOSIS — O09.521 SUPERVISION OF ELDERLY MULTIGRAVIDA IN FIRST TRIMESTER: ICD-10-CM

## 2020-12-04 DIAGNOSIS — O26.90 PREGNANCY, ANTEPARTUM, COMPLICATIONS: ICD-10-CM

## 2020-12-11 ENCOUNTER — COMMUNICATION - HEALTHEAST (OUTPATIENT)
Dept: MATERNAL FETAL MEDICINE | Facility: HOSPITAL | Age: 35
End: 2020-12-11

## 2020-12-12 LAB — TRISOMY 21,18,13 - HE HISTORICAL: NORMAL

## 2020-12-14 ENCOUNTER — COMMUNICATION - HEALTHEAST (OUTPATIENT)
Dept: MATERNAL FETAL MEDICINE | Facility: HOSPITAL | Age: 35
End: 2020-12-14

## 2020-12-17 ENCOUNTER — PRENATAL OFFICE VISIT - HEALTHEAST (OUTPATIENT)
Dept: MIDWIFE SERVICES | Facility: CLINIC | Age: 35
End: 2020-12-17

## 2020-12-17 ENCOUNTER — AMBULATORY - HEALTHEAST (OUTPATIENT)
Dept: LAB | Facility: CLINIC | Age: 35
End: 2020-12-17

## 2020-12-17 DIAGNOSIS — E03.9 HYPOTHYROIDISM, UNSPECIFIED TYPE: ICD-10-CM

## 2020-12-17 DIAGNOSIS — Z13.79 GENETIC SCREENING: ICD-10-CM

## 2020-12-17 DIAGNOSIS — O09.521 MULTIGRAVIDA OF ADVANCED MATERNAL AGE IN FIRST TRIMESTER: ICD-10-CM

## 2020-12-17 DIAGNOSIS — O09.529 SUPERVISION OF HIGH-RISK PREGNANCY OF ELDERLY MULTIGRAVIDA: ICD-10-CM

## 2020-12-17 DIAGNOSIS — E03.9 ACQUIRED HYPOTHYROIDISM: ICD-10-CM

## 2020-12-17 DIAGNOSIS — Z87.59 HISTORY OF PRE-ECLAMPSIA: ICD-10-CM

## 2020-12-17 LAB
BASOPHILS # BLD AUTO: 0 THOU/UL (ref 0–0.2)
BASOPHILS NFR BLD AUTO: 1 % (ref 0–2)
EOSINOPHIL # BLD AUTO: 0.1 THOU/UL (ref 0–0.4)
EOSINOPHIL NFR BLD AUTO: 2 % (ref 0–6)
ERYTHROCYTE [DISTWIDTH] IN BLOOD BY AUTOMATED COUNT: 13.2 % (ref 11–14.5)
HBA1C MFR BLD: 4.6 %
HCT VFR BLD AUTO: 40 % (ref 35–47)
HGB BLD-MCNC: 13.3 G/DL (ref 12–16)
HIV 1+2 AB+HIV1 P24 AG SERPL QL IA: NEGATIVE
IMM GRANULOCYTES # BLD: 0 THOU/UL
IMM GRANULOCYTES NFR BLD: 1 %
LYMPHOCYTES # BLD AUTO: 1.5 THOU/UL (ref 0.8–4.4)
LYMPHOCYTES NFR BLD AUTO: 19 % (ref 20–40)
MCH RBC QN AUTO: 29.4 PG (ref 27–34)
MCHC RBC AUTO-ENTMCNC: 33.3 G/DL (ref 32–36)
MCV RBC AUTO: 89 FL (ref 80–100)
MONOCYTES # BLD AUTO: 0.4 THOU/UL (ref 0–0.9)
MONOCYTES NFR BLD AUTO: 5 % (ref 2–10)
NEUTROPHILS # BLD AUTO: 5.8 THOU/UL (ref 2–7.7)
NEUTROPHILS NFR BLD AUTO: 73 % (ref 50–70)
PLATELET # BLD AUTO: 269 THOU/UL (ref 140–440)
PMV BLD AUTO: 10.4 FL (ref 8.5–12.5)
RBC # BLD AUTO: 4.52 MILL/UL (ref 3.8–5.4)
T4 FREE SERPL-MCNC: 1.2 NG/DL (ref 0.7–1.8)
TSH SERPL DL<=0.005 MIU/L-ACNC: 2.14 UIU/ML (ref 0.3–5)
WBC: 7.9 THOU/UL (ref 4–11)

## 2020-12-17 ASSESSMENT — EDINBURGH POSTNATAL DEPRESSION SCALE (EPDS): TOTAL SCORE: 3

## 2020-12-17 ASSESSMENT — MIFFLIN-ST. JEOR: SCORE: 1734.61

## 2020-12-18 LAB
ABO/RH(D): NORMAL
ABORH REPEAT: NORMAL
ANTIBODY SCREEN: NEGATIVE
BACTERIA SPEC CULT: NO GROWTH
HBV SURFACE AG SERPL QL IA: NEGATIVE
RUBV IGG SERPL QL IA: POSITIVE
T PALLIDUM AB SER QL: NEGATIVE

## 2020-12-22 ENCOUNTER — COMMUNICATION - HEALTHEAST (OUTPATIENT)
Dept: MIDWIFE SERVICES | Facility: CLINIC | Age: 35
End: 2020-12-22

## 2021-01-18 ENCOUNTER — AMBULATORY - HEALTHEAST (OUTPATIENT)
Dept: MIDWIFE SERVICES | Facility: CLINIC | Age: 36
End: 2021-01-18

## 2021-01-18 ENCOUNTER — PRENATAL OFFICE VISIT - HEALTHEAST (OUTPATIENT)
Dept: MIDWIFE SERVICES | Facility: CLINIC | Age: 36
End: 2021-01-18

## 2021-01-18 ENCOUNTER — AMBULATORY - HEALTHEAST (OUTPATIENT)
Dept: LAB | Facility: CLINIC | Age: 36
End: 2021-01-18

## 2021-01-18 DIAGNOSIS — E03.9 HYPOTHYROIDISM, UNSPECIFIED TYPE: ICD-10-CM

## 2021-01-18 DIAGNOSIS — O09.529 SUPERVISION OF HIGH-RISK PREGNANCY OF ELDERLY MULTIGRAVIDA: ICD-10-CM

## 2021-01-18 DIAGNOSIS — O09.521 MULTIGRAVIDA OF ADVANCED MATERNAL AGE IN FIRST TRIMESTER: ICD-10-CM

## 2021-01-18 DIAGNOSIS — E03.9 ACQUIRED HYPOTHYROIDISM: ICD-10-CM

## 2021-01-18 LAB
T4 FREE SERPL-MCNC: 1 NG/DL (ref 0.7–1.8)
TSH SERPL DL<=0.005 MIU/L-ACNC: 3.11 UIU/ML (ref 0.3–5)

## 2021-01-18 ASSESSMENT — MIFFLIN-ST. JEOR: SCORE: 1775.44

## 2021-01-25 ENCOUNTER — COMMUNICATION - HEALTHEAST (OUTPATIENT)
Dept: ENDOCRINOLOGY | Facility: CLINIC | Age: 36
End: 2021-01-25

## 2021-01-27 ENCOUNTER — PRENATAL OFFICE VISIT - HEALTHEAST (OUTPATIENT)
Dept: MIDWIFE SERVICES | Facility: CLINIC | Age: 36
End: 2021-01-27

## 2021-01-27 DIAGNOSIS — R30.0 DYSURIA: ICD-10-CM

## 2021-01-27 LAB
ALBUMIN UR-MCNC: NEGATIVE MG/DL
APPEARANCE UR: CLEAR
BILIRUB UR QL STRIP: NEGATIVE
COLOR UR AUTO: YELLOW
GLUCOSE UR STRIP-MCNC: NEGATIVE MG/DL
HGB UR QL STRIP: ABNORMAL
KETONES UR STRIP-MCNC: NEGATIVE MG/DL
LEUKOCYTE ESTERASE UR QL STRIP: ABNORMAL
NITRATE UR QL: NEGATIVE
PH UR STRIP: 6.5 [PH] (ref 5–8)
SP GR UR STRIP: 1.02 (ref 1–1.03)
UROBILINOGEN UR STRIP-ACNC: ABNORMAL

## 2021-01-27 ASSESSMENT — MIFFLIN-ST. JEOR: SCORE: 1803.56

## 2021-01-28 LAB — BACTERIA SPEC CULT: ABNORMAL

## 2021-01-29 ENCOUNTER — AMBULATORY - HEALTHEAST (OUTPATIENT)
Dept: ENDOCRINOLOGY | Facility: CLINIC | Age: 36
End: 2021-01-29

## 2021-01-29 ENCOUNTER — AMBULATORY - HEALTHEAST (OUTPATIENT)
Dept: MIDWIFE SERVICES | Facility: CLINIC | Age: 36
End: 2021-01-29

## 2021-01-29 ENCOUNTER — RECORDS - HEALTHEAST (OUTPATIENT)
Dept: ADMINISTRATIVE | Facility: OTHER | Age: 36
End: 2021-01-29

## 2021-01-29 ENCOUNTER — RECORDS - HEALTHEAST (OUTPATIENT)
Dept: ULTRASOUND IMAGING | Facility: HOSPITAL | Age: 36
End: 2021-01-29

## 2021-01-29 ENCOUNTER — OFFICE VISIT - HEALTHEAST (OUTPATIENT)
Dept: MATERNAL FETAL MEDICINE | Facility: HOSPITAL | Age: 36
End: 2021-01-29

## 2021-01-29 DIAGNOSIS — O26.90 PREGNANCY RELATED CONDITIONS, UNSPECIFIED, UNSPECIFIED TRIMESTER: ICD-10-CM

## 2021-01-29 DIAGNOSIS — E03.9 ACQUIRED HYPOTHYROIDISM: ICD-10-CM

## 2021-01-29 DIAGNOSIS — O09.522 MULTIGRAVIDA OF ADVANCED MATERNAL AGE IN SECOND TRIMESTER: ICD-10-CM

## 2021-01-29 DIAGNOSIS — Z33.1 PREGNANCY, INCIDENTAL: ICD-10-CM

## 2021-02-01 ENCOUNTER — AMBULATORY - HEALTHEAST (OUTPATIENT)
Dept: MIDWIFE SERVICES | Facility: CLINIC | Age: 36
End: 2021-02-01

## 2021-02-09 ENCOUNTER — AMBULATORY - HEALTHEAST (OUTPATIENT)
Dept: OBGYN | Facility: CLINIC | Age: 36
End: 2021-02-09

## 2021-02-09 ENCOUNTER — COMMUNICATION - HEALTHEAST (OUTPATIENT)
Dept: MIDWIFE SERVICES | Facility: CLINIC | Age: 36
End: 2021-02-09

## 2021-02-09 DIAGNOSIS — O26.812 PREGNANCY RELATED FATIGUE IN SECOND TRIMESTER: ICD-10-CM

## 2021-02-12 ENCOUNTER — AMBULATORY - HEALTHEAST (OUTPATIENT)
Dept: LAB | Facility: CLINIC | Age: 36
End: 2021-02-12

## 2021-02-12 DIAGNOSIS — O26.812 PREGNANCY RELATED FATIGUE IN SECOND TRIMESTER: ICD-10-CM

## 2021-02-12 LAB
ALBUMIN SERPL-MCNC: 2.8 G/DL (ref 3.5–5)
ALP SERPL-CCNC: 60 U/L (ref 45–120)
ALT SERPL W P-5'-P-CCNC: <9 U/L (ref 0–45)
ANION GAP SERPL CALCULATED.3IONS-SCNC: 8 MMOL/L (ref 5–18)
AST SERPL W P-5'-P-CCNC: 9 U/L (ref 0–40)
BASOPHILS # BLD AUTO: 0 THOU/UL (ref 0–0.2)
BASOPHILS NFR BLD AUTO: 0 % (ref 0–2)
BILIRUB SERPL-MCNC: 0.2 MG/DL (ref 0–1)
BUN SERPL-MCNC: 11 MG/DL (ref 8–22)
CALCIUM SERPL-MCNC: 8.5 MG/DL (ref 8.5–10.5)
CHLORIDE BLD-SCNC: 106 MMOL/L (ref 98–107)
CO2 SERPL-SCNC: 23 MMOL/L (ref 22–31)
CREAT SERPL-MCNC: 0.64 MG/DL (ref 0.6–1.1)
EOSINOPHIL # BLD AUTO: 0.2 THOU/UL (ref 0–0.4)
EOSINOPHIL NFR BLD AUTO: 2 % (ref 0–6)
ERYTHROCYTE [DISTWIDTH] IN BLOOD BY AUTOMATED COUNT: 13 % (ref 11–14.5)
FERRITIN SERPL-MCNC: 8 NG/ML (ref 10–130)
GFR SERPL CREATININE-BSD FRML MDRD: >60 ML/MIN/1.73M2
GLUCOSE BLD-MCNC: 95 MG/DL (ref 70–125)
HCT VFR BLD AUTO: 35.7 % (ref 35–47)
HGB BLD-MCNC: 12.1 G/DL (ref 12–16)
IMM GRANULOCYTES # BLD: 0.1 THOU/UL
IMM GRANULOCYTES NFR BLD: 1 %
LYMPHOCYTES # BLD AUTO: 1.7 THOU/UL (ref 0.8–4.4)
LYMPHOCYTES NFR BLD AUTO: 14 % (ref 20–40)
MCH RBC QN AUTO: 30.7 PG (ref 27–34)
MCHC RBC AUTO-ENTMCNC: 33.9 G/DL (ref 32–36)
MCV RBC AUTO: 91 FL (ref 80–100)
MONOCYTES # BLD AUTO: 0.7 THOU/UL (ref 0–0.9)
MONOCYTES NFR BLD AUTO: 6 % (ref 2–10)
NEUTROPHILS # BLD AUTO: 9.6 THOU/UL (ref 2–7.7)
NEUTROPHILS NFR BLD AUTO: 78 % (ref 50–70)
PLATELET # BLD AUTO: 233 THOU/UL (ref 140–440)
PMV BLD AUTO: 9.8 FL (ref 7–10)
POTASSIUM BLD-SCNC: 4.3 MMOL/L (ref 3.5–5)
PROT SERPL-MCNC: 6 G/DL (ref 6–8)
RBC # BLD AUTO: 3.94 MILL/UL (ref 3.8–5.4)
SODIUM SERPL-SCNC: 137 MMOL/L (ref 136–145)
WBC: 12.3 THOU/UL (ref 4–11)

## 2021-02-17 ENCOUNTER — AMBULATORY - HEALTHEAST (OUTPATIENT)
Dept: LAB | Facility: CLINIC | Age: 36
End: 2021-02-17

## 2021-02-17 DIAGNOSIS — E03.9 ACQUIRED HYPOTHYROIDISM: ICD-10-CM

## 2021-02-17 LAB — TSH SERPL DL<=0.005 MIU/L-ACNC: 0.63 UIU/ML (ref 0.3–5)

## 2021-02-18 LAB — T4 FREE SERPL-MCNC: 1.1 NG/DL (ref 0.7–1.8)

## 2021-03-04 ENCOUNTER — PRENATAL OFFICE VISIT - HEALTHEAST (OUTPATIENT)
Dept: MIDWIFE SERVICES | Facility: CLINIC | Age: 36
End: 2021-03-04

## 2021-03-04 DIAGNOSIS — Z87.59 HISTORY OF PRE-ECLAMPSIA: ICD-10-CM

## 2021-03-04 DIAGNOSIS — O26.812 PREGNANCY RELATED FATIGUE IN SECOND TRIMESTER: ICD-10-CM

## 2021-03-04 DIAGNOSIS — O09.522 MULTIGRAVIDA OF ADVANCED MATERNAL AGE IN SECOND TRIMESTER: ICD-10-CM

## 2021-03-04 DIAGNOSIS — E03.9 HYPOTHYROIDISM, UNSPECIFIED TYPE: ICD-10-CM

## 2021-03-04 DIAGNOSIS — R82.71 GBS BACTERIURIA: ICD-10-CM

## 2021-03-04 DIAGNOSIS — O09.529 SUPERVISION OF HIGH-RISK PREGNANCY OF ELDERLY MULTIGRAVIDA: ICD-10-CM

## 2021-03-04 ASSESSMENT — MIFFLIN-ST. JEOR: SCORE: 1829.87

## 2021-03-05 ENCOUNTER — COMMUNICATION - HEALTHEAST (OUTPATIENT)
Dept: ENDOCRINOLOGY | Facility: CLINIC | Age: 36
End: 2021-03-05

## 2021-03-05 DIAGNOSIS — E03.9 ACQUIRED HYPOTHYROIDISM: ICD-10-CM

## 2021-03-17 ENCOUNTER — AMBULATORY - HEALTHEAST (OUTPATIENT)
Dept: LAB | Facility: CLINIC | Age: 36
End: 2021-03-17

## 2021-03-17 DIAGNOSIS — E03.9 ACQUIRED HYPOTHYROIDISM: ICD-10-CM

## 2021-03-17 LAB
T4 FREE SERPL-MCNC: 1 NG/DL (ref 0.7–1.8)
TSH SERPL DL<=0.005 MIU/L-ACNC: 0.28 UIU/ML (ref 0.3–5)

## 2021-03-19 ENCOUNTER — COMMUNICATION - HEALTHEAST (OUTPATIENT)
Dept: ENDOCRINOLOGY | Facility: CLINIC | Age: 36
End: 2021-03-19

## 2021-04-06 ENCOUNTER — COMMUNICATION - HEALTHEAST (OUTPATIENT)
Dept: OBGYN | Facility: CLINIC | Age: 36
End: 2021-04-06

## 2021-04-06 ENCOUNTER — HOSPITAL ENCOUNTER (OUTPATIENT)
Dept: OBGYN | Facility: HOSPITAL | Age: 36
Discharge: HOME | End: 2021-04-06
Attending: ADVANCED PRACTICE MIDWIFE | Admitting: ADVANCED PRACTICE MIDWIFE

## 2021-04-06 LAB
ABO/RH(D): NORMAL
ALT SERPL W P-5'-P-CCNC: 11 U/L (ref 0–45)
ANTIBODY SCREEN: NEGATIVE
APTT PPP: 29 SECONDS (ref 24–37)
AST SERPL W P-5'-P-CCNC: 12 U/L (ref 0–40)
CREAT SERPL-MCNC: 0.57 MG/DL (ref 0.6–1.1)
CREAT UR-MCNC: 58.9 MG/DL
ERYTHROCYTE [DISTWIDTH] IN BLOOD BY AUTOMATED COUNT: 13.1 % (ref 11–14.5)
GFR SERPL CREATININE-BSD FRML MDRD: >60 ML/MIN/1.73M2
HCT VFR BLD AUTO: 35.3 % (ref 35–47)
HGB BLD-MCNC: 11.6 G/DL (ref 12–16)
INR PPP: 0.99 (ref 0.9–1.1)
MCH RBC QN AUTO: 30.3 PG (ref 27–34)
MCHC RBC AUTO-ENTMCNC: 32.9 G/DL (ref 32–36)
MCV RBC AUTO: 92 FL (ref 80–100)
PLATELET # BLD AUTO: 236 THOU/UL (ref 140–440)
PMV BLD AUTO: 9.7 FL (ref 8.5–12.5)
PROTEIN, RANDOM URINE - HISTORICAL: 10 MG/DL
PROTEIN/CREAT RATIO, RANDOM UR: 0.17
RBC # BLD AUTO: 3.83 MILL/UL (ref 3.8–5.4)
URATE SERPL-MCNC: 3.1 MG/DL (ref 2–7.5)
WBC: 10.5 THOU/UL (ref 4–11)

## 2021-04-14 ENCOUNTER — PRENATAL OFFICE VISIT - HEALTHEAST (OUTPATIENT)
Dept: MIDWIFE SERVICES | Facility: CLINIC | Age: 36
End: 2021-04-14

## 2021-04-14 DIAGNOSIS — Z87.59 HISTORY OF PRE-ECLAMPSIA: ICD-10-CM

## 2021-04-14 DIAGNOSIS — O26.03 EXCESSIVE WEIGHT GAIN DURING PREGNANCY IN THIRD TRIMESTER: ICD-10-CM

## 2021-04-14 DIAGNOSIS — O09.523 MULTIGRAVIDA OF ADVANCED MATERNAL AGE IN THIRD TRIMESTER: ICD-10-CM

## 2021-04-14 DIAGNOSIS — E03.9 HYPOTHYROIDISM, UNSPECIFIED TYPE: ICD-10-CM

## 2021-04-14 DIAGNOSIS — O26.813 PREGNANCY RELATED FATIGUE IN THIRD TRIMESTER: ICD-10-CM

## 2021-04-14 DIAGNOSIS — O09.529 SUPERVISION OF HIGH-RISK PREGNANCY OF ELDERLY MULTIGRAVIDA: ICD-10-CM

## 2021-04-14 DIAGNOSIS — R82.71 GBS BACTERIURIA: ICD-10-CM

## 2021-04-14 DIAGNOSIS — R79.0 LOW FERRITIN: ICD-10-CM

## 2021-04-14 LAB
FASTING STATUS PATIENT QL REPORTED: ABNORMAL
GLUCOSE 1H P 50 G GLC PO SERPL-MCNC: 68 MG/DL (ref 70–139)
HGB BLD-MCNC: 11.6 G/DL (ref 12–16)

## 2021-04-15 ENCOUNTER — COMMUNICATION - HEALTHEAST (OUTPATIENT)
Dept: MIDWIFE SERVICES | Facility: CLINIC | Age: 36
End: 2021-04-15

## 2021-04-15 LAB
FERRITIN SERPL-MCNC: 8 NG/ML (ref 10–130)
T PALLIDUM AB SER QL: NEGATIVE
T4 FREE SERPL-MCNC: 0.9 NG/DL (ref 0.7–1.8)
TSH SERPL DL<=0.005 MIU/L-ACNC: 0.94 UIU/ML (ref 0.3–5)

## 2021-05-01 ENCOUNTER — COMMUNICATION - HEALTHEAST (OUTPATIENT)
Dept: ENDOCRINOLOGY | Facility: CLINIC | Age: 36
End: 2021-05-01
Payer: COMMERCIAL

## 2021-05-06 ENCOUNTER — PRENATAL OFFICE VISIT - HEALTHEAST (OUTPATIENT)
Dept: MIDWIFE SERVICES | Facility: CLINIC | Age: 36
End: 2021-05-06

## 2021-05-06 DIAGNOSIS — O26.813 PREGNANCY RELATED FATIGUE IN THIRD TRIMESTER: ICD-10-CM

## 2021-05-06 DIAGNOSIS — O26.03 EXCESSIVE WEIGHT GAIN DURING PREGNANCY IN THIRD TRIMESTER: ICD-10-CM

## 2021-05-06 DIAGNOSIS — R82.71 GBS BACTERIURIA: ICD-10-CM

## 2021-05-06 DIAGNOSIS — O09.523 MULTIGRAVIDA OF ADVANCED MATERNAL AGE IN THIRD TRIMESTER: ICD-10-CM

## 2021-05-06 DIAGNOSIS — O09.529 SUPERVISION OF HIGH-RISK PREGNANCY OF ELDERLY MULTIGRAVIDA: ICD-10-CM

## 2021-05-06 DIAGNOSIS — E03.9 HYPOTHYROIDISM, UNSPECIFIED TYPE: ICD-10-CM

## 2021-05-06 ASSESSMENT — MIFFLIN-ST. JEOR: SCORE: 1920.59

## 2021-05-17 ENCOUNTER — COMMUNICATION - HEALTHEAST (OUTPATIENT)
Dept: MIDWIFE SERVICES | Facility: CLINIC | Age: 36
End: 2021-05-17
Payer: COMMERCIAL

## 2021-05-17 ENCOUNTER — AMBULATORY - HEALTHEAST (OUTPATIENT)
Dept: LAB | Facility: CLINIC | Age: 36
End: 2021-05-17

## 2021-05-17 ENCOUNTER — AMBULATORY - HEALTHEAST (OUTPATIENT)
Dept: MIDWIFE SERVICES | Facility: CLINIC | Age: 36
End: 2021-05-17

## 2021-05-17 DIAGNOSIS — O09.529 SUPERVISION OF HIGH-RISK PREGNANCY OF ELDERLY MULTIGRAVIDA: ICD-10-CM

## 2021-05-17 DIAGNOSIS — E03.9 ACQUIRED HYPOTHYROIDISM: ICD-10-CM

## 2021-05-17 LAB
T4 FREE SERPL-MCNC: 0.9 NG/DL (ref 0.7–1.8)
TSH SERPL DL<=0.005 MIU/L-ACNC: 1.27 UIU/ML (ref 0.3–5)

## 2021-05-20 ENCOUNTER — RECORDS - HEALTHEAST (OUTPATIENT)
Dept: ADMINISTRATIVE | Facility: OTHER | Age: 36
End: 2021-05-20

## 2021-05-20 ENCOUNTER — PRENATAL OFFICE VISIT - HEALTHEAST (OUTPATIENT)
Dept: MIDWIFE SERVICES | Facility: CLINIC | Age: 36
End: 2021-05-20

## 2021-05-20 DIAGNOSIS — O09.529 SUPERVISION OF HIGH-RISK PREGNANCY OF ELDERLY MULTIGRAVIDA: ICD-10-CM

## 2021-05-20 DIAGNOSIS — E03.9 HYPOTHYROIDISM, UNSPECIFIED TYPE: ICD-10-CM

## 2021-05-20 DIAGNOSIS — R79.0 LOW FERRITIN: ICD-10-CM

## 2021-05-20 DIAGNOSIS — R82.71 GBS BACTERIURIA: ICD-10-CM

## 2021-05-20 DIAGNOSIS — O26.813 PREGNANCY RELATED FATIGUE IN THIRD TRIMESTER: ICD-10-CM

## 2021-05-20 DIAGNOSIS — O26.03 EXCESSIVE WEIGHT GAIN DURING PREGNANCY IN THIRD TRIMESTER: ICD-10-CM

## 2021-05-20 ASSESSMENT — MIFFLIN-ST. JEOR: SCORE: 1920.59

## 2021-05-21 ASSESSMENT — EDINBURGH POSTNATAL DEPRESSION SCALE (EPDS): TOTAL SCORE: 2

## 2021-05-26 VITALS
WEIGHT: 217 LBS | HEIGHT: 69 IN | SYSTOLIC BLOOD PRESSURE: 116 MMHG | HEART RATE: 76 BPM | BODY MASS INDEX: 32.14 KG/M2 | DIASTOLIC BLOOD PRESSURE: 58 MMHG

## 2021-05-26 VITALS
DIASTOLIC BLOOD PRESSURE: 84 MMHG | SYSTOLIC BLOOD PRESSURE: 140 MMHG | HEART RATE: 89 BPM | RESPIRATION RATE: 16 BRPM | TEMPERATURE: 98.1 F

## 2021-05-26 VITALS
BODY MASS INDEX: 33.33 KG/M2 | SYSTOLIC BLOOD PRESSURE: 116 MMHG | DIASTOLIC BLOOD PRESSURE: 72 MMHG | HEART RATE: 84 BPM | WEIGHT: 225 LBS | HEIGHT: 69 IN

## 2021-05-27 VITALS
BODY MASS INDEX: 32.88 KG/M2 | HEART RATE: 72 BPM | SYSTOLIC BLOOD PRESSURE: 108 MMHG | DIASTOLIC BLOOD PRESSURE: 62 MMHG | HEIGHT: 69 IN | WEIGHT: 222 LBS

## 2021-05-27 VITALS
HEIGHT: 69 IN | DIASTOLIC BLOOD PRESSURE: 80 MMHG | WEIGHT: 214 LBS | SYSTOLIC BLOOD PRESSURE: 114 MMHG | BODY MASS INDEX: 31.7 KG/M2 | HEART RATE: 76 BPM

## 2021-05-27 VITALS — HEIGHT: 69 IN | WEIGHT: 256 LBS | BODY MASS INDEX: 37.8 KG/M2 | BODY MASS INDEX: 37.8 KG/M2

## 2021-05-27 VITALS — DIASTOLIC BLOOD PRESSURE: 56 MMHG | SYSTOLIC BLOOD PRESSURE: 114 MMHG | HEART RATE: 76 BPM

## 2021-05-27 VITALS
DIASTOLIC BLOOD PRESSURE: 64 MMHG | HEIGHT: 69 IN | RESPIRATION RATE: 17 BRPM | SYSTOLIC BLOOD PRESSURE: 112 MMHG | WEIGHT: 223.8 LBS | HEART RATE: 64 BPM | BODY MASS INDEX: 33.15 KG/M2

## 2021-05-27 VITALS
BODY MASS INDEX: 34.96 KG/M2 | SYSTOLIC BLOOD PRESSURE: 118 MMHG | HEART RATE: 84 BPM | WEIGHT: 236 LBS | DIASTOLIC BLOOD PRESSURE: 66 MMHG | HEIGHT: 69 IN

## 2021-05-28 ASSESSMENT — ANXIETY QUESTIONNAIRES
GAD7 TOTAL SCORE: 4
GAD7 TOTAL SCORE: 5

## 2021-05-28 NOTE — PROGRESS NOTES
Transfer of care Visit    Transfer of care from: [facility]  Number of visits: 1  Initial visit date: 3/11/2019 at 6+0 weeks  Initial hgb: 13.0 g/dL  Initial platelets: 275,000  UC 50,000 100,000 col/mL lactobacillus  Risks, progress thus far: Multiple ovarian teratomas surgically resected during first trimester on 2019  Reason for transfer: Move back to [place] ( was working in [facility])  Plan: MD consultation (Dr. Marie Gagnon) regarding teratoma removal surgery in first trimester    PRENATAL VISIT  TRANSFER OF CARE OBSTETRICAL EXAM - OB   Assessment / Impression   1.  34-year-old  1 para 0 with IUP at 14+3 weeks by certain LMP  2.  Size equals dates  3.  Status post surgical removal of bilateral ovarian teratomas in first trimester (2019)   4.  Hypothyroidism  5.  Asthma  6.  Seasonal allergies  7.  Elevated EPDS score at transfer of care visit    Plan:   -Address elevated EPDS when RTC in 1 week.  -IOB labs results reviewed.   -Continue thyroid medication as directed.  Plan to check thyroid function q. trimester.  -Pt is at risk for and therefore interested in drawing lead level: Plan to draw next week.   -Patient is undecided regarding waterbirth.    -Reviewed prenatal care schedule.   -Optimal nutrition and weight gain discussed. Pregnancy weight gain of 15-25 lbs (BMI 25.0-29.9) encouraged.   -Anticipatory guidance for common pregnancy questions and concerns reviewed.   -Danger s/sx for this trimester reviewed with patient.   -Reviewed genetic screening options with patient.  Explore when RTC in 1 week.  -IOB packet given and reviewed with patient.   -CNM services and hospital options reviewed; emergency and scheduling phone numbers given to patient.   -Return to clinic in 1 week for follow-up.  -Staff message sent to Dr. Marie Gagnon regarding surgical history this pregnancy.  Plan to await her response before sending a message to CNM group.    Total time spent with  "patient: 60 minutes, >50% time spent counseling and coordinating care.   Subjective:   Divina SEBASTIAN See is a 34 y.o. G 1 P 0 here today for her first obstetrical exam with [facility] nurse midwives at 14w4d; this is a TRANSFER OF CARE from [facility]. Here with  Derik. This pregnancy is not planned. The patient reports nausea, but no vomiting, fatigue and some mild breast tenderness.  Patient's last menstrual period was 2019 (exact date)., predicting an expected date of delivery of Estimated Date of Delivery: 19. Last period was normal. Her pregnancy is dated by certain LMP.   \"My obstetrician in [place] told me to take vaginal progesterone 3 times daily, but admittedly, I have not done that consistently.  She did not know how much progesterone my ovaries would produce considering the effects of surgery.\"  The patient states that she is in a monogamous relationship and states that she is safe.   Current symptoms also include: Fatigue related to recovery from recent surgery on 2019: Removal of bilateral ovarian teratomas.  Risk factors: Abdominal surgery in first trimester. Pregnancy Risk Factors: Abdominal surgery in first trimester.   Social History:   Education level: College graduate  Occupation: Unemployed  Partners name: Derik  ?   OB History    Para Term  AB Living   1             SAB TAB Ectopic Multiple Live Births                  # Outcome Date GA Lbr Loyd/2nd Weight Sex Delivery Anes PTL Lv   1 Current               History:   Past Medical History:   Diagnosis Date     Abnormal Pap smear of cervix      Asthma      Chickenpox      Headache      HPV (human papilloma virus) infection      Hypothyroidism      Seasonal allergies      Teratoma of ovary 2019    Bilateral, surgically removed during pregnancy     UTI (urinary tract infection)       Past Surgical History:   Procedure Laterality Date     APPENDECTOMY       MOUTH SURGERY       TERATOMA EXCISION Bilateral " "04/09/2019    Ovaries      Family History   Problem Relation Age of Onset     Alcoholism Mother      Alcoholism Father      Alcoholism Brother       Social History     Tobacco Use     Smoking status: Former Smoker     Smokeless tobacco: Never Used   Substance Use Topics     Alcohol use: Not Currently     Drug use: Not Currently      Current Outpatient Medications   Medication Sig Dispense Refill     levothyroxine (SYNTHROID, LEVOTHROID) 25 MCG tablet Take 25 mcg by mouth daily.       OMEGA 3-DHA-EPA-VITAMIN D3 ORAL Take by mouth.       prenat.vits,ariana,min-iron-folic (PRENATAL VITAMIN) Tab Take by mouth daily.       No current facility-administered medications for this visit.       No Known Allergies   The patient's medical, surgical and family histories were reviewed and were pertinent to this visit.      EPDS score today: 12/30  Review of Systems   General: Fatigue but otherwise denies problem   Eyes: Denies problem   Ears/Nose/Throat: Denies problem   Cardiovascular: Denies problem   Respiratory: Denies problem   Gastrointestinal: Nausea without vomiting, otherwise negative   Genitourinary: Denies any discharge, vaginal bleeding or itchiness or any other problem   Musculoskeletal: Breast tenderness otherwise denies problem   Skin: Denies problem   Neurologic: Denies problem   Psychiatric: Denies problem   Endocrine: Denies problem   Heme/Lymphatic: Denies problem   Allergic/Immunologic: Denies problem       Objective:   Objective    Vitals:    05/09/19 1318   BP: 124/76   Pulse: 76   Weight: 197 lb (89.4 kg)   Height: 5' 8.25\" (1.734 m)      Physical Exam:   General Appearance: Alert, cooperative, no distress, appears stated age   Abdomen: Gravid, soft, non-tender.  Surgical scar apparent on the lower aspect of patient's abdomen.  Scar is erythematous but not extending to surrounding skin, no purulent discharge or edema.  Nontender.  FHT: Not heard  Uterus: 14 to 16 weeks size   Musculoskeletal: Extremities " normal, atraumatic, no cyanosis   Skin: Skin color, texture, turgor normal, no rashes or lesions   Lymph nodes: Cervical, supraclavicular, and axillary nodes normal   Neurologic: Alert and oriented x 3. Normal speech   US OB < 14 Weeks (Order [id number])   Imaging   Date: 5/9/2019 Department: [facility] Released By/Authorizing: Gloria Smith APRN, CNM   Study Result        EXAM DATE:         05/09/2019     EXAM: US OB AFTER 14 WKS SINGLE FETUS  LOCATION: [facility]  DATE/TIME: 5/9/2019 4:15 PM     INDICATION: viability / COULD NOT HEAR HEARTBEAT  COMPARISON: None.  TECHNIQUE: Routine.     FINDINGS:  There is a single, living intrauterine gestation in variable presentation.  Normal fetal motion with a heart rate of 161. Adequate amniotic fluid. Placental  disc is anterior. No hemorrhage.     It's too early to do a fetal anatomic survey.     Left ovary is seen and normal. Right ovary is not seen due to body habitus.     BIOMETRY:  Biparietal Diameter: 2.4 cm, 14 weeks, 1 days  Head Circumference: 9.7 cm, 14 weeks, 4 days  Abdominal Circumference: 7.7 cm, 14 weeks, 2 days  Femur Length: 1.4 cm, 14 weeks, 2 days     Estimated Fetal Weight: 93 g  EFW Percentile: 70%     Fetal Heart Rate: 161 bpm  Cervical Length: 3.1 cm  Distance from Placenta to Cervix: 3.8 cm     EDC by This US exam: 11/4/19     Composite Age by This US: 14 weeks, 3 days     CONCLUSION:  1.  Single, living intrauterine gestation measuring 14 weeks and 3 days.  2.  EDC is 11/04/2019.  3.  Findings called to Dr. Gloria Smith at 1626.

## 2021-05-28 NOTE — TELEPHONE ENCOUNTER
Previous clinic: [facility] appointment date & location: May 9th, 2019 at [facility] with KZ  Weeks gestation at appointment: 14  How will records be sent to the clinic?: faxed to [facility]  Phone number where you may be reached: [phone]

## 2021-05-28 NOTE — PROGRESS NOTES
Divina presents to the clinic by herself.  Reviewed ultrasound results from last week.  FHTs heard easily today.  No quickening as of yet.  Relayed to patient that Dr. Gagnon, OB/GYN supports midwifery management of pregnancy despite ovarian teratoma removal in first trimester.  When genetic screening explored again today, patient requesting to contact her insurance company since they will not cover noninvasive prenatal testing, patient will inquire with [employer] in regard to a payment plan versus second trimester genetic screening/quad screen.  RTC in 1 week for either NIPT or quad screen and include lead screening.  Plan to order 20-week fetal anatomy ultrasound as well.  Explore elevated EPDS result last week.  At second trimester teaching completed.  Danger signs and symptoms reviewed.  All questions answered.  Encouraged to call or return to clinic with any questions, concerns, or as needed.

## 2021-05-29 NOTE — PROGRESS NOTES
Divina presents to the clinic alone.  Called and spoke with James; Anshul would cost $700.  Patient requesting quad screen instead.  MSAFP4, lead level, thyroid cascade and 20-week fetal anatomy ultrasound ordered.  Second trimester teaching completed.  Danger signs and symptoms reviewed.  All questions answered.  Encouraged to call or return to clinic with any questions, concerns, or as needed.

## 2021-05-29 NOTE — PATIENT INSTRUCTIONS - HE
"  Patient Education   HEALTHY PREGNANCY CARE: 14 to 18 WEEKS PREGNANT    During this time, you may start to \"show,\" so that you look pregnant to people around you. You may also notice some changes in your skin, such as an increase in acne on your face. You may notice your heart pounding, a sharp stretching ache on either side of your lower abdomen (round ligament), and more vaginal discharge.     Your baby's nerves and muscles are maturing. Sex organs are recognizable. Your baby is now able to pass urine, and your baby's first stool (meconium) is starting to collect in his or her intestines. Hair is also beginning to grow on your baby's head. Your baby is moving freely inside your uterus but you may not be able to feel it until 18-20 weeks.    Continue making healthy choices for your baby during pregnancy, including good nutrition, exercise and a safe environment free from smoking, alcohol and drugs.    Your genetic screening with a quad screen blood test may have been done today.    Watch for warning signs and contact your midwife or physician at the clinic with any concerns at [facility] at Phone: [phone]. For example, call about cramping, bleeding, abdominal pain, watery vaginal discharge, or if you are unable to keep fluids down for more than 24 hours due to vomiting.   If it's after clinic hours, physician patients should call the [facility] at [phone] ([phone]); midwife patients should call their answering service at [phone].    How can you care for yourself at home?   You can refer to the Starting Out Right book or find it online at [email or web address] or [email or web address]#[email or web address]     You can sign up for a weekly parenting e-mail that gives support, tips and advice from health care professionals that starts with " "pregnancy and continues through the toddler years. To register, go to [email or web address] at any time during your pregnancy.       Patient Education   HEALTHY PREGNANCY CARE: 18-22 WEEKS PREGNANT    Your baby is continuing to develop quickly. At this stage, babies can now suck their thumbs,  firmly with their hands, and are beginning to hear.    Sometime between 18 and 22 weeks, you will start to feel your baby move. At first, these small fetal movements feel like fluttering or \"butterflies.\" Some women say that they feel like gas bubbles. As the baby grows, these movements will become stronger and be able to be felt through your abdomen.     Nutrition: During this time, you may find that your nausea and fatigue are gone. Overall, you may feel better and have more energy than you did in your first trimester. Be sure you are getting enough calcium and iron in your diet. Your prenatal vitamins cannot supply all of the nutrients you need, so continue to eat 3-4 servings of dairy foods and 2-3 servings of meat/fish/poultry/nuts every day. Foods high in iron include: red meats, eggs, dark green vegetables, dark yellow vegetables, nuts, kidney beans and chickpeas. Some cereals are fortified with iron, so look at the food labels for 100% of the daily requirement for iron.     [place] for childbirth and parenting classes, including an infant CPR class. Breastfeeding classes are recommended too.    Plan for the gestational diabetes screening between weeks 24-28. You can eat normally before that visit; we would suggest making sure you have protein foods, but not a lot of carbohydrates or sugary foods.    Your blood type was determined at your first OB appointment. If you are Rh negative, you should discuss the need for a Rhogam shot with your midwife or physician.     If you had a  birth in the past, discuss a trial of labor with your midwife or physician. He or she may ask that you obtain your operative " report from that  if you are wanting to have a vaginal birth after  () this time.     Think about the support you have, and what help you can plan on from family and friends, after your baby is born. Many mothers and babies are ready to go home from the hospital within a few days. Your clinic staff is available to assist you and the [facility] staff is available to you after hours. Start preparing your other children for changes they'll experience with the new baby. Explore day care options.    You may find that you have new discomforts now, such as sleep problems or leg cramps. To access information that can help you ease these discomforts, you can refer to the Starting Out Right book or find it online at [email or web address] or [email or web address]-[email or web address]#p=8    You can sign up for a weekly parenting e-mail that gives support, tips and advice from health care professionals that starts with pregnancy and continues through the toddler years. To register, go to [email or web address] at any time during your pregnancy.    Watch for the warning signs of premature labor:     Dull, low backache    Contractions of the uterus, menstrual-like cramps    Abdominal cramping with or without diarrhea    More pelvic pressure    Increase or change in vaginal discharge.     Remember that labor doesn't have to hurt. Never hesitate to call your midwife or physician or their staff at [facility] at Phone: [phone] for any one of these warning signs - or if something just doesn't feel right. If it's after clinic hours, physician patients should call the [facility] at [phone] ([phone]); midwife patients should call their answering service at [phone].

## 2021-05-29 NOTE — PATIENT INSTRUCTIONS - HE
[facility] Nurse Midwives - Contact information:  Appointment line and to get a hold of CNM in clinic Monday-Friday 8 am - 5 pm:  [phone].  There are some clinics with early start times (1st appointment 7:40 am) and others with evening hours (last appointment 6:20 pm).  Most are typically open from 8 am to 5 pm.    CNM on call answering service: [phone].  Specify your hospital of choice and leave a brief message for CNM;  will then page CNM who is on call at your specified hospital and you should receive a call back with 15 minutes.  Be sure that your ringer is audible and that you can accept blocked calls so that we can get back in touch with you! This number should be reserved for urgent needs if during the day, before 8 am, after 5 pm, weekends, holidays.    Contact the on-call CNM with warning signs, such as:    vaginal bleeding     Vaginal discharge and itching or pain and burning during urination    Leg/calf pain or swelling on one side    severe abdominal pain    nausea and vomiting more than 4-5 times a day, or if you are unable to keep anything down    fever more than 100.4 degrees F.          You are invited to  Meet the [facility] Nurse-Midwives  A way to tour the hospital Labor and Delivery unit and meet the midwives that attend births since you may not have the opportunity to meet them during your prenatal care.  Some sessions are informal meet and greet type social hours, others address a specific concern or topic.    2019 7-8pm  [facility]    2019 7-8pm  [facility], Auditorium [facility]    Thursday, August 15, 2019 7-8pm  [facility]    2019 7-8 pm  [facility], Aud[facility]    Please call [phone] to register      UNDERSTANDING  LABOR    Going into labor before your 37th week of pregnancy is called  labor.   labor can cause your baby to be born too soon. This can lead to a number of health problems that may affect your baby. From 28-35 weeks, Patients are advised to be [facility] since they have a  Intensive Care Unit (NICU).  -Before labor, the cervix is thick and closed.  -In  labor, the cervix begins to efface (thin) and dilate (open) over a short period of time    Symptoms of  Labor  If you believe you re having  labor, contact the midwives right away. But contractions alone don t mean you re in  labor. What matters more are changes in your cervix (the lower end of the uterus). Symptoms of  labor include:    five or more contractions per hour    Strong & frequent contractions    Constant menstrual-like cramping    Low-back pain    Mucous or bloody vaginal discharge    Bleeding or spotting in the second or third trimester    Evaluating  Labor  Your midwife will try to find out whether you re in  labor or whether you re just having contractions.She may watch you for a few hours. The following tests may be done:    Pelvic exam to see if your cervix has effaced (thinned) and dilated (opened)    Uterine activity monitoring to detect contractions    Fetal monitoring to check the health of your baby    Ultrasound to check your baby s size and position    Caring for Yourself At Home  If you have contractions  but your cervix is still thick and closed, the midwife may ask you to do the following at home:    Drink plenty of water.    Do fewer activities.    Rest in bed on your side.    Avoid intercourse and nipple stimulation.    When to Call Your Midwife    Five or more contractions per hour    Bag of water breaks    Bleeding or spotting     If You Need Hospital Care   labor often requires that you have hospital care and complete bed rest. You may have an IV (intravenous) line to get fluids. And you may be given pills or  an injection to help prevent contractions. Finally, you may receive medication (corticosteroids) that helps your baby s lungs mature more quickly.    Are You At Risk?  Any pregnant woman can have  labor. It may start for no reason. But these risk factors can increase your chances:    Past  labor or past early birth    Smoking and drug or alcohol use during pregnancy    Multiple fetuses (twins or more)    Problems with the shape of the uterus    Bleeding during the pregnancy    The Dangers of  Birth  A baby born too soon may have health problems. This is because the baby didn t have enough time to mature. The baby then is at risk of:    Not breastfeeding well    Having immature lungs    Bleeding in the brain    Dying    Reaching Term  Your goal is to get as close to term as you can before giving birth. The closer you get to term, the higher your chance of having a healthy baby. Work with your healthcare provider. Together, you can take steps that may keep you from giving birth too early.        Testing for Gestational Diabetes in Pregnancy  [facility] Nurse-Midwives are committed to providing safe care during your pregnancy. We follow the recommendations of the American Diabetes Association and the American College of Obstetricians and Gynecologists to test all pregnant women for gestational diabetes. Testing early in pregnancy (if you have risk factors) and testing all women between 26-28 weeks follows local and national guidelines for care during pregnancy. Clients who feel that they cannot consent to such testing, may choose to transfer their care to our consultant obstetricians.  What is the test?  Eat normally on the day of the test; a diet rich in protein, whole grains, and vegetables would be best. Avoid simple sugars, white flour products and juices prior to testing.  You will be asked to drink a 10 oz glucose beverage (50 gm, about the same as a can of root beer).  The product is not  carbonated as it has to be consumed within 5 minutes. During the next hour, you are seen for a prenatal visit, but are asked to limit your activity around the clinic. Feel free to bring a book or your computer.   Any level less than 140 for this  glucose challenge test  is considered normal. If measured at 140 to 185, the diagnostic test, a  3 hour glucose tolerance test  will be conducted. If 2 or more readings are abnormal, the diagnosis of gestational diabetes is confirmed and a referral to a diabetes educator will be made. If the level is 185 or above, this confirms the diagnosis and a referral will be made without administering the 3 hour test.  A fasting blood sugar may be performed sometime in the first trimester if you have risk factors for the development of gestational diabetes such as a previous diagnosis or birth of a large baby or a close family relative with diabetes.  What is gestational diabetes?  Your body converts what you eat into glucose. In response to rising blood sugar levels it secretes insulin in order to be able to utilize that glucose. During pregnancy as the placenta grows and matures, it secretes hormones that are necessary to assure baby s growth and development, however, they also reduce the action of insulin in the mother. In some cases too much insulin is blocked (this is called  insulin resistance ) and blood sugar in the mother rises above a normal level. Pregnant women who have never had diabetes before but who have high blood sugar (glucose) levels during pregnancy are said to have gestational diabetes. Gestational diabetes affects about 4-7% of all pregnancies.  What if I have gestational diabetes?  If either of the tests for gestational diabetes show that you have this condition, a referral will be made to visit with the diabetes educator. The educator will help you to make wise food choices, count carbohydrates, monitor your blood sugar and record your levels in a journal. We  ask that you bring this diary with you at each prenatal visit. You may meet with the educator several times during the remainder of your pregnancy.  How gestational diabetes can affect your baby  Some mothers may wonder why testing for GDM is delayed until the early part of the 3rd trimester for most women. Gestational diabetes has an impact on the baby at the time of rapid body growth. When the mother s blood has elevated sugar levels, extra glucose crosses the placenta causing the baby to have excess weight gain ( macrosomia ). Some babies are too big to be born vaginally so their mother must have  births. Babies of mothers with uncontrolled gestational diabetes may have difficult births that can cause trauma to the mother and in rare circumstances, babies may suffer fractures or oxygen deprivation during birth. They may have difficulty maintaining their own blood sugar after birth and they may also have trouble adapting to life outside. Recent research indicates that babies of mothers with uncontrolled or undiagnosed gestational diabetes are at risk for obesity and type 2 diabetes. Women who develop gestational diabetes are more likely to develop type 2 diabetes within 15 years after their pregnancy.  Additional Information  The American College of Nurse Midwives (ACNM) provides an information sheet describing diabetes screening in pregnancy: http://www.womensdocs.com/amanda/pdf/Second_Trimester/Gestational_Diabetes.pdf    You can visit the American Diabetes Association website http://www.diabetes.org for additional information and to purchase their book,  Gestational Diabetes: What to Expect .    A brochure from the American College of Obstetrics and Gynecology is available at:   http://www.acog.org/~/media/For%20Patients/ary585.pdf?dmc=[email or web address]&uj=[email or web address]  Testing for gestational diabetes is a critical part of your prenatal journey. Thank you for taking good care of yourself and your baby.    HEALTHY  PREGNANCY CARE: 22-26 WEEKS PREGNANT    You are finishing your second trimester. Your baby is developing rapidly. At this stage, babies have a sense of balance, can respond to touch, and are recognizing parent voices.  Your baby will be moving around more now.  You may notice Corea-Story contractions now, which are painless and prepare the uterus for the delivery.    Nutrition: During this time, you may find that your nausea and fatigue are gone. Overall, you may feel better and have more energy than you did in your first trimester. Be sure you are getting enough calcium and iron in your diet. Your prenatal vitamins cannot supply all of the nutrients you need, so continue to eat 3-4 servings of dairy foods and 2-3 servings of meat/fish/poultry/nuts every day. Foods high in iron include: red meats, eggs, dark green vegetables, dark yellow vegetables, nuts, kidney beans and chickpeas. Some cereals are fortified with iron, so look at the food labels for 100% of the daily requirement for iron.     Discuss your work situation with your midwife or physician as needed. If you stand for long periods of time, you may need to make changes and take breaks.    [employer] for childbirth and parenting classes, including an infant CPR class. Breastfeeding classes are recommended too.    Childbirth and Parenting Education:   [place] parenting center: http://OOHLALA MobileMarshall Medical CenterSchoolControl/   (460) 447-BABY  [place]: (education, yoga & wellness) www.Cytonics  Enlightened Mama: www.[employer]ened[employer].RightNow Technologies   Childbirth collective: (Parent topic nights)  www.childbirthcollective.org/  Hypnobabies:  www.hypnobabiestwincities.com/  Hypnobirthing:  Http://hypnobirthing.com/    Book Recommendations:   Aysha Jose Armando's Birthing From Within--first few chapters include a new-age tone, you may prefer to skip it and keep going, because there is good stuff later.  This book recommendation covers emotional preparation, but does cover coping with pain, and use of  "both pharmacological and nonpharmacological methods.    Dr. Cedillo' The Pregnancy Book and The Birth Book--the pregnancy book goes month-by month    Womanly Art of Breastfeeding by La Leche League International   Bestfeeding by Elba Mireles--great pictures    Mothering Your Nursing Toddler, by Chitra Palacio.   Addresses dealing with so many of the challenging behaviors of a nursing toddler.  How Weaning Happens, by La Leche League.  Discusses weaning at all ages, from medically necessary weaning of an infant, all the way up to age 5 (or older), with why/why not, and strategies.  Very empowering book both for deciding to wean and deciding not to.    American College of Nurse-Midwives (ACNM) http://www.midwife.org/; look at the informational handouts at http://www.midwife.org/Share-With-Women     www.mymidwife.org    Mother to Baby (Medication and Herbal guidance in pregnancy): http://www.mothertobaby.org  Toll-Free Hotline: 242.212.6142  LactMed (Medication use while breastfeeding): http://toxnet.nlm.nih.gov/newtoxnet/lactmed.htm    Women's Health.gov:  http://www.womenshealth.gov/a-z-topics/index.html    American pregnancy association - http://americanpregnancy.org    Centering Pregnancy (group prenatal care option): http://centeringhealthcare.org    Information about doulas:  Childbirth collective: http://www.childbirthcollective.org/  Doulas of [place] ([place]):  www.ca.org  [facility]  project: http://twincitiesdoulaproject.com/     Early Childhood and Family Education (ECFE):  ECFE offers parents hands-on learning experiences that will nourish a lifetime of teachable moments.  http://ecfe.info/ecfe-home/    March of Dimes www.iFlexMe.Parko     FDA - Nutrition  www.mypyramid.gov  Under \"For Consumers,\" click on \"pregnant and breastfeeding women.\"      Centers for Disease Control and Prevention (CDC) - Vaccines : http://www.cdc.gov/vaccines/       When researching information on the web, " question the validity of websites.  The JasonDB .gov, .edu and.org tend to be more reliable information.  If there are a lot of advertisements, be cautious of the information provided. Stay away from blogs and chat rooms please!    How can you care for yourself at home?   You can refer to the Starting Out Right book or find it online at http://www.healtheast.org/images/stories/maternity/[facility]-Starting-Out-Right.pdf or http://www.healtheast.org/images/stories/flipbooks/[facility]-starting-out-right/[facility]-starting-out-right.html#p=8     You can sign up for a weekly parenting e-mail that gives support, tips and advice from health care professionals that starts with pregnancy and continues through the toddler years. To register, go to www.SNSplus.org/baby at any time during your pregnancy.      Baby Feeding in the Hospital: Information, Support and Resources    As you prepare for the birth of your child, you will want to consider options for feeding your baby including breast-feeding and/or baby formula. The American Academy of Pediatrics recommends exclusive breast-feeding for the first six months (although any amount of breast-feeding is beneficial).  However, we also understand that breast-feeding is a personal choice and not for everyone. Whether or not you choose to breast-feed, your decision will be respected by our staff.    There are numerous benefits of breast-feeding; here are a few to consider:    Provides antibodies to protect your baby from infections and diseases    The cost: formula can cost over $1,500 per year    Convenience, no warming up or sterilizing bottles and supplies    The physical contact with breastfeeding can make babies feel secure, warm and comforted    What ever my feeding choice, what can I expect after I deliver my baby?    Your baby will usually be placed skin-to-skin immediately following birth. The skin to skin contact between you and your baby will be a special and  memorable time. The bonding and attachment comforts your baby and has a positive effect on baby s brain development.     Having your baby  room in  with you also helps you start to learn your baby s body rhythms and sleep cycle.      You will also begin to learn your baby s cues (signals) that he or she is ready to feed.    When do I start to feed my baby?  As soon as possible after your baby s birth, you will be encouraged to begin feeding.  In the first couple of weeks, your baby will eat often.  Breastfeeding babies usually eat at least 8 times in 24 hours.  Babies fed formula usually eat at least 7 times in 24 hours.      Breast-feeding tips:    Get comfortable and use pillows for support.    Have your baby at the level of your breast, facing you,  tummy to tummy .      Touch your nipple to your baby s lips so you baby s mouth opens wide (rooting reflex).  Aim the nipple toward the roof of your baby s mouth. When your baby opens his or her mouth, pull your baby toward your breast to help your baby  latch on  to your nipple and much of the areola area.    Hand expressing your breast milk can assist with latching your baby to your breast, if needed.    Ask for help, breastfeeding may seem awkward or uncomfortable at first, this is normal. There are numerous resources available at [facility] and beyond.     If your goal is to exclusively breastfeed, avoid using any formula or artificial nipples (including bottles and pacifiers) while you are your baby are learning to breastfeed unless there is a medical reason.       Mixing breastfeeding and formula can interfere with how you begin building your milk supply.  It can impact how you and your baby  learn  to breastfeeding together and alter the natural growth of  good  bacteria in your baby s stomach.    Delay a pacifier or a bottle in the first few weeks until breastfeeding is well established. This is often around 3 weeks of age.    Ask your nurse  to show you how to hand express.   Breast milk can be kept in the refrigerator or freezer for later use.        Touring the Maternity Care Center  To schedule a tour at either [place] or [facility], please do so online using the following links:  [facility] - [email or web address] Heart [email or web address]&[email or web address]  [facility] - [email or web address] Heart [email or web address]    Hospital and Clinic  Resources:  -Schedule an appointment with a [facility] CNM who is also a Lactation Consultant by calling [phone]     -Schedule a clinic appointment with a [facility] CNM with dedicated clinic hours for breastfeeding assistance by calling [phone]. Breastfeeding clinic visits are at [facility] on Wednesdays, [facility] on Tuesdays and [facility] on Thursdays.     [facility] Lactation Clinics located at [facility], [facility] and [facility]  Call: [phone].    Inpatient support    Outpatient appointments    Telephone consultation    Breast-feeding classes available through [email or web address]      -Attend a baby weigh in at [facility].  Lactation consultants are available to answer questions  [facility]: Tuesdays 1:00 - 2:00  [facility]: Mondays 1:00 - 2:00  [email or web address]    -Attend one of the New Mama groups at [facility] in [facility].  [facility] also offers one-on-one in home and in office lactation consults.   www.[facility]    -Attend a LeLeche League meeting.  Multiple groups in several locations throughout the [facility]. The meetings are no-cost and always informative breastfeeding education session through [employer] La Leche League  Www.lllofmndas.org/  Medication use while breastfeeding: http://toxnet.nlm.nih.gov/newtoxnet/lactmed.htm     Online  Resources:    [facility].org/baby sign up for free online weekly e-mail    [facility]altheast.org/maternity    Breastfeedingmadesimple.com    Linkuali.Loterity (La Leche League)    Normalfed.com    Womenshealth.gov/breastfeeding    Workandpump.com    Breast-feeding Supplies & Pumps:  Talk to your insurance provider or WIC (Women, Infants and Children) to learn more about options available to you. Recent health insurance changes may include additional coverage for supplies and pumps.    Public Health:  Women, Infants and Children Nutrition program (WIC): provides breast-feeding support and education in addition to formal feeding moms. 312-ZOA-7406 or http://www.health.Count includes the [facility].mn.us/divs/fh/wic    Family Health Home Visiting: [facility] Nurse home visits are available. Talk to your provider to see if you qualify. Most [facility] have a program available.    Additional Resources:  La Leche League is an international, nonprofit, nonsectarian organization offering information, education, and support to mothers who want to breast-feed their babies. Local groups offer phone help and monthly meetings. Visit ZeroDesktop.Loterity or MetricStream.Loterity and us the  Find local support  drop down menu or click on the  Resources  tab.    [place] Breastfeeding Resources: 0-964-924-BABY (7018) toll free    National Breastfeeding Help Line trained breastfeeding peer counselors can help answer common breast-feeding questions by phone. Monday-Friday: English/Ghanaian  6-772- [phone] toll free, [phone] (TTY)    [facility] Connection: [phone]-[phone] ([phone])

## 2021-05-29 NOTE — PROGRESS NOTES
Divina is here with Derik for a routine prenatal visit at 20 weeks and 2 days.  Had anatomy ultrasound this morning which was normal with the exception of suboptimal thoracic spine views and a sandal gap was identified.  They were informed that this can sometimes be a soft marker for Down syndrome.  Reviewed normal quad screen drawn at last visit.  Also discussed that in the absence of any other abnormalities, the sandal gap is unlikely to be associated with Down syndrome.  Offered a follow-up level 2 ultrasound with [facility] for further evaluation and also to reevaluate the thoracic spine.  Also offered NIPT. Patient undecided at this point.  Will consider options, talk to insurance company and contact to [facility] if a follow-up Level 2 ultrasound or NIPT testing is desired.    In general she is feeling well.  Struggling some with headaches.  Symptom relief reviewed.  Has concerns about her weight, total weight gain 26 pounds with an interval weight gain of 8 pounds since last visit.  Admits she is eating large amounts of simple carbohydrates and she could be making healthier choices.  Reviewed importance of protein and the fats in pregnancy and encouraged her to increase these in her diet.  For a referral to a dietitian which patient accepts.  She is walking for 60 to 75 minutes 3 times per week.  Encouraged her to try walking shorter amounts of time more frequently, 5-6 days a week    States she felt an increase in her energy level after starting on the higher dose of her Synthroid.  But in the last several weeks, her energy level has decreased.  Reviewed recommendation for repeat thyroid labs today.  If they continue to be abnormal, recommend referral to endocrine for further management and guidance.     During exam, patient felt quickening for the first time.  Started crying with delight!    Midpregnancy anticipatory guidance given. Warning signs and how and when to contact the on-call midwife reviewed.     Divina left  without having thyroid labs drawn today.  Patient will return for a lab only visit within the next week.

## 2021-05-29 NOTE — PROGRESS NOTES
Divina received Gloria's voicemail indicating that her TSH was elevated 5/22/19 and that she should increase her levothyroxine to 75mcg daily.    Thyroid cascade 5/22/2019: TSH 5.40 (H).  Synthroid increased from 25 to 75 mcg p.o. daily.    Encouraged Divina to take three of her 25mcg levothyroxine tablets daily until she can  the prescription. Called her pharmacy and confirmed that it is ready for her.   DANELLE Rodriguez,MAYLINM

## 2021-05-29 NOTE — PROGRESS NOTES
Ultrasound reviewed with suboptimal views of thoracic spine and noted sandal gap present. Reviewed prenatal chart and noted normal QUAD screen result from last routine visit. Will defer to CNM to see pt today for review with pt in person and establish pt preference for follow up and plan of care.

## 2021-05-30 NOTE — PATIENT INSTRUCTIONS - HE
[facility] Nurse Midwives - Contact information:  Appointment line and to get a hold of CNM in clinic Monday-Friday 8 am - 5 pm:  [phone].  There are some clinics with early start times (1st appointment 7:40 am) and others with evening hours (last appointment 6:20 pm).  Most are typically open from 8 am to 5 pm.    CNM on call answering service: [phone].  Specify your hospital of choice and leave a brief message for CNM;  will then page CNM who is on call at your specified hospital and you should receive a call back with 15 minutes.  Be sure that your ringer is audible and that you can accept blocked calls so that we can get back in touch with you! This number should be reserved for urgent needs if during the day, before 8 am, after 5 pm, weekends, holidays.    Contact the on-call CNM with warning signs, such as:    vaginal bleeding     Vaginal discharge and itching or pain and burning during urination    Leg/calf pain or swelling on one side    severe abdominal pain    nausea and vomiting more than 4-5 times a day, or if you are unable to keep anything down    fever more than 100.4 degrees F.      You are invited to  Meet the [facility] Nurse-Midwives  A way to tour the hospital Labor and Delivery unit and meet the midwives that attend births since you may not have the opportunity to meet them during your prenatal care.  Some sessions are informal meet and greet type social hours, others address a specific concern or topic.    Tuesday, Februrary 12, 2019 7-8pm  [facility]    Wednesday, April 17, 2019 7-8pm  [facility]    Thursday, August 15, 2019 7-8pm  [facility]    Wednesday November 13, 2019 7-8 pm  [facility]    Please call [phone] to register        Touring the Maternity Care Center  To schedule a tour at either [place] or [facility], please do so online using the  following links:  [place] - [email or web address]  [place] - [email or web address]       DAILY FETAL MOVEMENT COUNTING    One of the best ways to keep track of a healthy baby is to notice its movements. Healthy babies are very active. However, some perfectly normal babies may sleep quietly as long as 60 minutes without moving. Babies who are having problems are sluggish and move less. Counting these movements can provide your nurse-midwife with a warning of developing problems.    You should begin this counting at the around your 7th to 8th month of your pregnancy (28-32 weeks) if you are ever concerned that there is less movement than normal for your baby.     INSTRUCTIONS    1.  If able, drink 2 glasses of fluid and lie down on one side.  Put your hand on your abdomen.  2. Count 10 separate times that the baby moves. A movement may be a kick, turn, or flip of the baby.  3.  Record the time you feel the 10th movement. When you count the 10th movement, stop counting.  4.  If one hour passes with less than 10 movements, drink a large glass of fruit juice. Then count for the another hour. If you do not reach 10 movements, call the midwives    REMEMBER      The baby may move all 10 times in an hour or less.    The baby may take up to five hours to move 10 times.    The important thing is to know what is normal for your baby so you can tell your nurse-midwife when something different is happening.    CALL THE NURSE-MIDWIFE IF:      You do not feel 10 movements in five hours.    You have not felt the baby move all day.    It is taking longer and longer each day to get the 10th movement.      Pregnancy: Your Third Trimester Changes  How You Are Changing  Your body is preparing for the birth of your baby. Some of the most common changes  are listed below. If you have any questions or concerns, ask your midwife.    You ll gain more weight from fluids, extra blood, and fat deposits. Your baby will gain an average of an ounce per day (a half a pound a week)!    Your breasts will grow as your body gets ready to feed the baby. They may be more tender. You may also notice a slight yellow or white discharge from the nipples.    Discharge from your vagina may increase. This is normal.    You might see some skin color changes on your forehead, cheeks, or nose. Most of these will go away after you deliver.  How Your Baby Is Growing    Month 7  Your baby is about 14 inches long. If born prematurely (too early), your baby would likely survive with special care.   Month 8  Your baby is building up body fat. Baby kicks strongly, but is getting too big to move around much.   Month 9  Your baby weighs nearly 7 pounds and is about 18-20 inches long. In other words, any day now...       UNDERSTANDING  LABOR    Going into labor before your 37th week of pregnancy is called  labor.  labor can cause your baby to be born too soon. This can lead to a number of health problems that may affect your baby. From 28-35 weeks, Patients are advised to be evaluated at [facility] since they have a  Intensive Care Unit (NICU).  -Before labor, the cervix is thick and closed.  -In  labor, the cervix begins to efface (thin) and dilate (open) over a short period of time    Are You At Risk?  Any pregnant woman can have  labor. It may start for no reason. But these risk factors can increase your chances:    Past  labor or past early birth    Smoking and drug or alcohol use during pregnancy    Multiple fetuses (twins or more)    Problems with the shape of the uterus    Bleeding during the pregnancy    The Dangers of  Birth  A baby born too soon may have health problems. This is because the baby didn t have enough  time to mature. The baby then is at risk of:    Not breastfeeding well    Having immature lungs    Bleeding in the brain    Dying    Reaching Term  Your goal is to get as close to term as you can before giving birth. The closer you get to term, the higher your chance of having a healthy baby. Work with your healthcare provider. Together, you can take steps that may keep you from giving birth too early.    Symptoms of  Labor  If you believe you re having  labor, contact the midwives right away. But contractions alone don t mean you re in  labor. What matters more are changes in your cervix (the lower end of the uterus).   Symptoms of  labor include:    five or more contractions per hour    Strong & frequent contractions    Constant menstrual-like cramping    Low-back pain        Mucous or bloody vaginal discharge    Bleeding or spotting in the second or third trimester    Evaluating  Labor  Your midwife will try to find out whether you re in  labor or whether you re just having contractions.She may watch you for a few hours. The following tests may be done:    Pelvic exam to see if your cervix has effaced (thinned) and dilated (opened)    Uterine activity monitoring to detect contractions    Fetal monitoring to check the health of your baby    Ultrasound to check your baby s size and position    Caring for Yourself At Home  If you have contractions  but your cervix is still thick and closed, the midwife may ask you to do the following at home:    Drink plenty of water.    Do fewer activities.    Rest in bed on your side.    Avoid intercourse and nipple stimulation.    When to Call Your Midwife    Five or more contractions per hour    Bag of water breaks    Bleeding or spotting     If You Need Hospital Care   labor often requires that you have hospital care and complete bed rest. You may have an IV (intravenous) line to get fluids. And you may be given pills or  an injection to help prevent contractions. Finally, you may receive medication (corticosteroids) that helps your baby s lungs mature more quickly.    Syphilis Screening:   The [place] Department of Health ([facility]) provided new recommendations for screening during pregnancy. If you are pregnant, [facility] recommends testing three times during your pregnancy: at your first visit, 28 weeks, and after delivery.   Syphilis is:     Caused by a bacteria spread by sexual contact    Rising among [place] women of child-bearing age  Syphilis can:     Be passed on to infants during pregnancy or during delivery and can be life threatening    Be cured. There are ways to protect yourself and your babies.        HEALTHY PREGNANCY CARE: 26-30 WEEKS PREGNANT    You are now in your last trimester of pregnancy. Your baby is growing rapidly, can open and close her eyelids, sometimes get hiccups, and you'll probably feel her moving around more often. Your baby has breathing movements and is gaining about one ounce each day. You may notice heartburn and leg cramps. Your back may ache as your body gets used to your baby's size and length.    Discuss your work situation with your midwife or physician as needed. If you stand for long periods of time, you may need to make changes and take breaks.    Pre-register after 30 weeks online at the hospital where your baby will be born [email or web address]      Be aware of your baby's activity level. You may be asked to do daily fetal movement counts. Contact your midwife or physician about any decreased movement.    You may have been tested for gestational diabetes today. If you are RH negative, you may have had an additional test and a Rhogam injection.    Consider receiving a Tdap vaccination to protect your baby from Pertussis/whooping cough.    Baby Feeding in the Hospital: Information, Support and Resources    As you prepare for the birth of your child, you will want  to consider options for feeding your baby including breast-feeding and/or baby formula. The American Academy of Pediatrics recommends exclusive breast-feeding for the first six months (although any amount of breast-feeding is beneficial).  However, we also understand that breast-feeding is a personal choice and not for everyone. Whether or not you choose to breast-feed, your decision will be respected by our staff.    There are numerous benefits of breast-feeding; here are a few to consider:    Provides antibodies to protect your baby from infections and diseases    The cost: formula can cost over $1,500 per year    Convenience, no warming up or sterilizing bottles and supplies    The physical contact with breastfeeding can make babies feel secure, warm and comforted    What ever my feeding choice, what can I expect after I deliver my baby?    Your baby will usually be placed skin-to-skin immediately following birth. The skin to skin contact between you and your baby will be a special and memorable time. The bonding and attachment comforts your baby and has a positive effect on baby s brain development.     Having your baby  room in  with you also helps you start to learn your baby s body rhythms and sleep cycle.      You will also begin to learn your baby s cues (signals) that he or she is ready to feed.    When do I start to feed my baby?  As soon as possible after your baby s birth, you will be encouraged to begin feeding.  In the first couple of weeks, your baby will eat often.  Breastfeeding babies usually eat at least 8 times in 24 hours.  Babies fed formula usually eat at least 7 times in 24 hours.      Breast-feeding tips:    Get comfortable and use pillows for support.    Have your baby at the level of your breast, facing you,  tummy to tummy .      Touch your nipple to your baby s lips so you baby s mouth opens wide (rooting reflex).  Aim the nipple toward the roof of your baby s mouth. When your baby  opens his or her mouth, pull your baby toward your breast to help your baby  latch on  to your nipple and much of the areola area.    Hand expressing your breast milk can assist with latching your baby to your breast, if needed.    Ask for help, breastfeeding may seem awkward or uncomfortable at first, this is normal. There are numerous resources available at [facility], Clinics and beyond.     If your goal is to exclusively breastfeed, avoid using any formula or artificial nipples (including bottles and pacifiers) while you are your baby are learning to breastfeed unless there is a medical reason.       Mixing breastfeeding and formula can interfere with how you begin building your milk supply.  It can impact how you and your baby  learn  to breastfeeding together and alter the natural growth of  good  bacteria in your baby s stomach.    Delay a pacifier or a bottle in the first few weeks until breastfeeding is well established. This is often around 3 weeks of age.    Ask your nurse to show you how to hand express.   Breast milk can be kept in the refrigerator or freezer for later use.    Hospital and Clinic  Resources:  -Schedule an appointment with a [facility] CNM who is also a Lactation Consultant by calling [phone]     -Schedule a clinic appointment with a [facility] CNM with dedicated clinic hours for breastfeeding assistance by calling [phone]. Breastfeeding clinic visits are at [facility] on Wednesdays, [facility] on Tuesdays and [facility] on Thursdays.     [facility] Lactation Clinics located at [facility], [facility] and [facility]  Call: [phone].    Inpatient support    Outpatient appointments    Telephone consultation    Breast-feeding classes available through [employer]        -Attend a baby weigh in at [facility].  Lactation consultants are available to answer questions  [place]: Tuesdays 1:00 - 2:00  [place]  [facility]: Mondays 1:00 - 2:00  www.[facility] HospitalGivitntAdStack.EquityMetrix    -Attend one of the [facility] groups at [facility] in [facility].  [facility] also offers one-on-one in home and in office lactation consults.   www.[facility].EquityMetrix    -Attend a Renetta Samuels meeting.  Multiple groups in several locations throughout the [facility]. The meetings are no-cost and always informative breastfeeding education session through [employer]eche League  Www.geovanny.org/  Medication use while breastfeeding: http://toxnet.nlm.nih.gov/newtoxnet/lactmed.htm     Online Resources:    [facility].org/baby sign up for free online weekly e-mail    [facility].org/maternity    Breastfeedingmadesimple.com    Wholeshare.[place] (La Leche League)    Normalfed.com    Womenshealth.gov/breastfeeding    Workandpump.com    Breast-feeding Supplies & Pumps:  Talk to your insurance provider or WIC (Women, Infants and Children) to learn more about options available to you. Recent health insurance changes may include additional coverage for supplies and pumps.    Public Health:  Women, Infants and Children Nutrition program (WIC): provides breast-feeding support and education in addition to formal feeding moms. 200-QVQ-4048 or http://www.health.[facility].mn.us/divs/fh/wic    Family Health Home Visiting: Public Health Nurse home visits are available. Talk to your provider to see if you qualify. Most [facility] have a program available.    Additional Resources:  La Leche League is an international, nonprofit, nonsectarian organization offering information, education, and support to mothers who want to breast-feed their babies. Local groups offer phone help and monthly meetings. Visit Foruforever or G.I. Java.[place] and us the  Find local support  drop down menu or click on the  Resources  tab.    [place] Breastfeeding Resources: 7-867-622-BABY (2111) toll free    National Breastfeeding Help Line trained breastfeeding peer counselors can help  answer common breast-feeding questions by phone. Monday-Friday: English/Tristanian  7-225- [phone] toll free, 1-791.593.5423 (TTY)    [facility] Connection: 652-163-[facility] ([phone])      Resources:    Childbirth and Parenting Education:   [place] parenting center: http://KonnektidLos Angeles General Medical CenterSafetyTat/   (647) 336-ATWR  [place]: (education, yoga & wellness) www.[employer]  Enlightened Mama: www.enlightenedmama.[employer]   Childbirth collective: (Parent topic nights)  www.childbirthcollective.org/  Hypnobabies:  www.hypnobabiestAskemcities.[employer]/  Hypnobirthing:  Http://hypnobirthing.com/    Book Recommendations:   Aysha Jose Armando's Birthing From Within--first few chapters include a new-age tone, you may prefer to skip it and keep going, because there is good stuff later.  This book recommendation covers emotional preparation, but does cover coping with pain, and use of both pharmacological and nonpharmacological methods.    Dr. Cedillo' The Pregnancy Book and The Birth Book--the pregnancy book goes month-by month    Womanly Art of Breastfeeding by La Leche League International   Bestfeeding by Elba Mireles--great pictures    Mothering Your Nursing Toddler, by Chitra Palacio.   Addresses dealing with so many of the challenging behaviors of a nursing toddler.  How Weaning Happens, by La Leche League.  Discusses weaning at all ages, from medically necessary weaning of an infant, all the way up to age 5 (or older), with why/why not, and strategies.  Very empowering book both for deciding to wean and deciding not to.    American College of Nurse-Midwives (ACNM) http://www.midwife.org/; look at the informational handouts at http://www.midwife.org/Share-With-Women     www.mymidwife.org    Mother to Baby (Medication and Herbal guidance in pregnancy): http://www.mothertobaby.org  Toll-Free Hotline: 622.478.1894  LactMed (Medication use while breastfeeding): http://toxnet.nlm.nih.gov/newtoxnet/lactmed.htm    Women's  "Health.gov:  http://www.womenshealth.gov/a-z-topics/index.html    American pregnancy association - http://americanpregnancy.org    Centering Pregnancy (group prenatal care option): http://centeringhealthcare.org    Information about doulas:  Childbirth collective: http://www.childbirthcollective.org/  Doulas of [place] ([place]):  www.ca.org  [facility]  project: http://Dockercitiesdoulaproject.[employer]/     Early Childhood and Family Education (ECFE):  ECFE offers parents hands-on learning experiences that will nourish a lifetime of teachable moments.  http://ecfe.info/ecfe-home/    March of Dimes www.kooaba     FDA - Nutrition  www.mypyramid.gov  Under \"For Consumers,\" click on \"pregnant and breastfeeding women.\"      Centers for Disease Control and Prevention (CDC) - Vaccines : http://www.cdc.gov/vaccines/       When researching information on the web, question the validity of websites.  The domains .gov, .edu and.org tend to be more reliable information.  If there are a lot of advertisements, be cautious of the information provided. Stay away from blogs and chat rooms please!     "

## 2021-05-30 NOTE — PROGRESS NOTES
"Divina Goyal is here by herself for a routine prenatal visit at 24w3d.    She states that she spoke with ANTHONY Smith CNM in the last week (though this MAYLINM cannot find documentation/phone note) about tingling/numbness in hands - symptoms described were consistent with carpal tunnel in pregnancy and Divina states Gloria advised wrist splints which have been helpful!  She is feeling fetal movement regularly. Had f-up Level II which was normal - happy to have done that as she wanted to be prepared if needed.     I noted that Divina's weight gain in 1 month was 9 lbs.  While tearful discussing her weight, she also was open to the conversation as she states \"I don't want to continue on this trajectory.\"  She states that she knows gaining too much weight is not ideal for her or her baby.  She went to a nutrition appointment which she states was not helpful at all.  She notes how she had surgery in her first trimester and that kept her from being as active as she is used to.  She states \"everyone keeps telling me to walk\", but she finds that is not working and it is also very humid outside.  I explored what she likes to do for exercise and she states that weights are her thing.  I discussed that with pregnancy and exercise, if there are certain activities she is used - such as working out with weights, cardio, etc. It is safe to continue.  I discussed modifications and listening to her body in pregnancy, especially if she has taken a break from these regular activities.      She is taking her  test next Friday.  She has been studying for that a lot which has led her to be \"more sedentary\" than she would like to be.  Discussed plan to get through taking that test and focusing more on activity.      Discussed 28 week labs/screening for gestational diabetes, anemia and RPR are recommended at her next visit. She is wondering what she can do to prevent GDM.  Discussion above is applicable, but we also discussed " "that there is nothing special to \"prepare\" for the 1 hr GCT - discussed eating what she normally would and coming for her visit. Handouts given on glucose testing and TdaP during pregnancy.  Anticipatory guidance, warning signs, when to call and CNM contact information reviewed.                 "

## 2021-05-30 NOTE — PROGRESS NOTES
"Please see \"Imaging\" tab under Chart Review for full report.  This ultrasound was performed in the [facility], and may be located under Care Everywhere.    Giovana Randhawa MD  Maternal Fetal Medicine    "

## 2021-05-30 NOTE — TELEPHONE ENCOUNTER
Telephone call from Divina reporting that she noticed a good sized lump on her left shoulder near her should blade last night.  Reports no pain.  Does not think it is pregnancy related, but wanted to check-in.   Patient encouraged to be seen by her PCP or walk-in care if she desires for evaluation as it is unlikely related to pregnancy.  Patient states understanding and is in agreement with the plan.  DANELLE Piper,CNM

## 2021-05-30 NOTE — PROGRESS NOTES
"Nutrition Assessment    Patient seen for outpatient MNT initial assessment.    Referring diagnosis: Excessive weight gain affecting pregnancy    Height: Height: 5' 8.25\" (1.734 m)    Weight: 213 lb (96.6 kg)  BMI: 31.9  BMI indication: 25-29.9 overweight  Patient's Goal Weight: 141 lb    Lifestyle changes made prior to nutrition session:  The patient reports incorporating all foods and listening to her body during pregnancy. She does not want to be restrictive during pregnancy, though she has done Whole 30 in the past.     Assessment of diet/weight history: She had nausea during her first trimester and into the second one. During that time she craved comfort foods like potatoes in chip and tater tot form. She was unable to handle the smell of cooking meat while feeling nauseous, but now can eat and cook meat. She typically cooks food and reads ingredient labels. She is studying for a certification exam currently, and has been eating easy to prepare foods like chips and salsa, pasta and taco salads. She also likes foods such as roasted broccoli, oatmeal and chicken burgers, and plans to eat such foods after her exam July 20.    Assessment of physical activity: She typically walks 3 days per week, but wants to increase the number of days she walks after her exam. She has not been walking at all recently since she has been studying so much, so I encouraged her to take breaks throughout the day where she walks during each break.    Nutrition intervention that addressed medical nutrition therapy for above diagnosis: Weight management. Discussed 7645-4663 calorie level. Meal plan provided. Discussed the importance of weight maintenance during pregnancy. She has already gained more than recommended based on a BMI of overweight pre-pregnancy. I told her not to lose weight while pregnant though. She plans to track her calories and see how that affects her weight. I told her to increase calories if she notices weight " loss.    RD reviewed physical activity and other-eating well for pregnancy    Education materials provided: Pregnancy Nutrition Therapy, MyPlate image, Iron-containing foods    Goals: Maintain weight, meet daily food group consumption goals    Patient had no barriers to learning, verbalized understanding, and compliance is expected.    Phone number provided for questions. Recommended follow-up in as needed.    Thank you for this consult. Call me at [phone] for questions.  Fact to face time: 45 minutes   Savita Lamas RD, LD

## 2021-05-31 NOTE — TELEPHONE ENCOUNTER
PHONE CALL TO ON-CALL CNM:  Divina is traveling in [place].  Has been there for several days.  It's really hot.  She's been eating food she doesn't eat typically and on her feet a lot.  Noticing swelling in feet which seems to be worse than typical, and also getting worse over the past few days.    L foot is more swollen than R.  R foot also swollen, but less (they measured her feet in circumference and the R is 1/2 inch less swollen than L).  Area of swelling is the part of her foot about the arch of heel.  Swelling also located on top of foot.  Ankle and leg not really swollen.    Doesn't think has had an injury.  No area of her foot/ankle/leg is red, hot, or streaking.  Also there isn't any significant pain with walking/weight bearing. Overall doesn't seem to have s/s of a blood clot.  CNM asked about s/s for pre-eclampsia too.  She's had a HA for past 2 days, but is recovering from a cold and the HA feels related to that.  Not bad enough where she feels she needs to take anything.  No visual changes.  No epigastric pain.  Hasn't taken BP.    Educated about s/s of blood clot and s/s of pre-eclampsia.  To go to a local hospital (that practices obstetrics) if she has any concerns for either of these as in that case she would need to be evaluated.  Did offer that she could get her BP taken at [facility]/etc.  She says she might consider doing this.      Also asked if has elevated feet/legs about level of heart.  She hasn't tried this.  Will try this.    All questions answered.  To seek help from a local hospital PRN.  To call back to CNMs PRN too.  Patient agrees with plan.

## 2021-05-31 NOTE — PROGRESS NOTES
Divina is here alone for routine prenatal visit at 27 weeks and 2 days.  She passed her personal training exam, relieved to have that behind her.  She did join a gym in the last month and states it has felt good to be more active.  She is mostly doing weight training with light weights.  She is doing some walking outside.  6 pound interval weight gain noted.  Reports regular fetal movements.  Denies  labor symptoms. Reports a cough at night, 'sounds like a smokers cough'.  Denies other associated symptoms like fever, chills, sore throat.  Quickly resolved and she is able to sleep.  Does not experience a cough at any other time during the day.  Recommended evaluation with family medicine provider cough persists or worsens.  Planning to complete CBE through [facility].  Accepts repeat thyroid cascade today.  Also, GCT, hemoglobin, RPR, Tdap today.  Encouraged to call with any  labor symptoms as reviewed her changes in fetal movements.  Reviewed schedule at next visits.

## 2021-05-31 NOTE — PATIENT INSTRUCTIONS - HE
[facility] Nurse [facility]wives - Contact information:  Appointment line and to get a hold of CNM in clinic Monday-Friday 8 am - 5 pm:  [phone].  There are some clinics with early start times (1st appointment 7:40 am) and others with evening hours (last appointment 6:20 pm).  Most are typically open from 8 am to 5 pm.    CNM on call answering service: [phone].  Specify your hospital of choice and leave a brief message for CNM;  will then page CNM who is on call at your specified hospital and you should receive a call back with 15 minutes.  Be sure that your ringer is audible and that you can accept blocked calls so that we can get back in touch with you! This number should be reserved for urgent needs if during the day, before 8 am, after 5 pm, weekends, holidays.    Contact the on-call CNM with warning signs, such as:    vaginal bleeding     Vaginal discharge and itching or pain and burning during urination    Leg/calf pain or swelling on one side    severe abdominal pain    nausea and vomiting more than 4-5 times a day, or if you are unable to keep anything down    fever more than 100.4 degrees F.        You are invited to  Meet the [facility] Nurse-Midwives  A way to tour the hospital Labor and Delivery unit and meet the midwives that attend births since you may not have the opportunity to meet them during your prenatal care.  Some sessions are informal meet and greet type social hours, others address a specific concern or topic.    Tuesday, Februrary 12, 2019 7-8pm  [facility]    Wednesday, April 17, 2019 7-8pm  [facility]    Thursday, August 15, 2019 7-8pm  [facility]    Wednesday November 13, 2019 7-8 pm  [facility], [facility]    Please call [phone] to register          Touring the Maternity Care Center  To schedule a tour at either [place] or [facility], please do so online using  "the following links:  [place] - [email or web address]  [facility] - [email or web address]      Pre-registration for Hospital Stay:  Sometime betweeen 30-37 weeks, it is recommended that you \"pre-register\" for your upcoming hospital stay on our website:    [email or web address]    Breastfeeding and Birth Control  How do I decide what birth control method is best for me while I am breastfeeding?  Choosing a method of birth control is very personal. First, answer the following questions:      Do you want to have more children?    How much spacing between births do you want for your children?    Do you smoke or have you had any health problems, such as liver disease or a blood clot?  Talk about the answers to each of these questions with your health care provider to help you choose the best method for you.    Can I use breastfeeding as my birth control?  Using breastfeeding as your birth control (the lactational amenorrhea method) can be a good way to keep from getting pregnant in the first months after the baby is born. Each time your baby nurses, your body releases a hormone called prolactin, which stops your body from making the hormones that cause you to ovulate (release an egg). If you are not ovulating, you cannot get pregnant.    The lactational amenorrhea method works only if:    you have not started your period yet.    you are breastfeeding only and not giving your baby any other food or drink.    you are breastfeeding at least every 4 hours during the day and every 6 hours at night.    your baby is less than 6 months old.  When any 1 of these 4 things is not happening, you no longer have good protection from getting pregnant, and you should use another form of birth control.    What birth control " methods are safe for me to use while I breastfeed?    Methods without hormones  Methods without hormones do not affect you, your baby, or your breastfeeding.  Methods without hormones that are the most effective    The copper intrauterine contraceptive device (IUD) (ParaGard) is a small, T-shaped device that is in- serted into your uterus (womb) through the vagina and cervix. The copper IUD lasts for 10 years.    Sterilization (getting your tubes tied or your partner having a vasectomy) is very effective, but it is per- manent. You should choose sterilization only if you do not want to have more children.  A method without hormones that is effective    The lactational amenorrhea method described above is effective for the first 6 months.  Methods without hormones that are less effective    Natural family planning is monitoring your body for signs of ovulation and not having sex when you think you are ovulating. This method is reliable only if you are having regular periods every month.    Barrier methods (condoms, diaphragms, sponges, and spermicides) are used at the time you have sex. These methods are effective only if you use them correctly every time.    Methods with hormones  Birth control methods that use hormones can be used while you are breastfeeding. They may have a small effect on lowering the amount of milk you make. All hormones will get into your breast milk in very small amounts, but there is no known harm to your baby from this small amount of hormone in breast milk.only methodsmethods use only 1 hormone, called progestin. You can start them right after your baby is born or wait 4 to 6 weeks to make sure your milk supply is good.     Progestin-only pills ( minipills ): If you like to take pills every day, you can use the minipill. In order forpill to work well, you have to take 1 at the same time each day. When you stop breastfeeding, you should start pills that have both estrogen and progestin  because they are better at keeping you from get- ting pregnant.     Progestin IUD (Mirena): The progestin IUD is shaped and inserted into the uterus like the copper IUD. It works for up to 5 years. Both IUDs are usually inserted 4 to 6 weeks after the baby is born.    Progestin implant (Nexplanon): The progestin implant is a small matchstick-sized flexible manuel. It is placed into the fatty tissue in the back of your arm. It works for up to 3 years.    Progestin shot (Depo-Provera): The progestin shot is given every 3 months.    estrogen and progestin methods  These methods use 2 hormones, called estrogen and progestin.     These methods increase your risk of a blood clot, which is already higher than normal after you have a baby. You should not use them until your baby is at least 6 weeks old. The combined methods are not recommended as the first choice for women who are breastfeeding. If a combined method is the one that you feel will be best for you to prevent getting pregnant, these methods are okay to use while breastfeeding.     Combined birth control pills: You take a pill each day.    Vaginal ring (NuvaRing): The ring is worn in the vagina for 3 weeks then left out for 1 week before youin a new ring.    Patch (Ortho Evra): The patch is placed on your skin and changed every week for 3 weeks then left off forweek before putting a new patch on a different area of your skin.    Pediatric Care Providers at [facility]:    Choosing the right provider is one of the most important decisions you ll make about your health care. We can help you find the right one. Remember, you re looking for a provider you can trust and work with to improve your health and well-being, so take time to think about what you need. Depending on how complicated your health care needs are, you may need to see more than one type of provider.    Primary Care Providers: You ll see a primary care provider first for most health issues. They ll work  with you to get your recommended screenings, help you manage chronic conditions, and refer you to other types of providers if you need them. Your primary care provider may be called a family physician or doctor, internist, general practitioner, nurse practitioner, or physician s assistant. Your child or teenager s provider may be called a pediatrician.  Specialists: You ll see a specialist for certain services or to treat specific conditions. Specialists include cardiologists, oncologists, psychologists, allergists, podiatrists, and orthopedists. You may need a referral from your primary care provider before you go to a specialist in order for your health plan to pay for your visit.\pardHere are some tips for finding a provider where you live:  If you already have a provider you like and want to keep working with, call their office and ask if they accept your coverage.  Call your insurance company or state Medicaid and CHIP program. Look at their website or check your member handbook to find providers in your network who take your health coverage.  Ask your friends or family if they have providers they like and use these tools to compare health care providers in your area.    Family Medicine at [facility]: [email or web address]/[email or web address]-[email or web address]    Many of our families enjoy all seeing the same doctor, who comes to know the whole family very well. We base our practice on the knowledge of the patient in the context of family and community.  WHY CHOOSE A FAMILY MEDICINE PHYSICIAN?  Ability of the whole family to see the same doctor  Focus on the whole person, including physical and emotional health  A personal relationship with their doctor that is nurtured over time  Respect for individual and family beliefs and values  No need to change primary care providers when a certain age is reached  Coordination of care when other health care services are needed    Pediatrics at [facility]:    [email or web address]    Through a teaching affiliation with the [facility], [facility] staff keeps current on new developments in the field of pediatrics.     Everything we do centers around caring for children. We place special emphasis on wellness and prevention.pediatric care team includes a team of pediatricians and certified nurse practitioners who provide care to pediatric and adolescent patients ages 0 to 18, and some up to the age of 26. We offer preventive health maintenance for healthy children as well the diagnosis and treatment of common and chronic illnesses and injuries. In addition, we also offer several pediatric specialists who focus on adolescent health issues and developmental and behavioral issues.    Circumcision  What is circumcision?  At birth, baby boys have loose skin that covers the head of the penis. This skin is called the foreskin. When all or part of the foreskin of the penis is cut off, this is called circumcision.  Why is circumcision done?  Circumcision is done for many reasons including Denominational, cultural, looks, and health. Some Denominational groups circumcise all boys as a ismael-based practice. Many people in the [place] choose to circumcise their baby boys because they believe it is culturally normal. It is not a common practice in [place], [place], or [place].  Some parents choose circumcision so that their son will have a penis that looks like his father s if the father was also circumcised. Other people choose circumcision because they believe it is  or will protect the boy or man from infection or cancer later in life.  Does circumcision protect against infection or cancer?  Circumcision does seem to protect against some types of infection or cancer. Cancer of the penis is one type of cancer that circumcision may prevent. However, cancer of the penis is very rare. One hundred thousand circumcisions would need to  be done to prevent one case of cancer of the penis. Circumcision may also decrease the chance of some sexually transmitted infections, such as HIV and human papilloma virus (HPV). See the next page for more information on the risks and benefits of circumcision.  What happens during a circumcision?  Babies born in the hospital are usually circumcised before they go home. Health care providers also perform circumcisions in their offices and clinics within a few weeks after birth.  Scientologist circumcisions are most often done at home or in a Alevism.  Before the circumcision is performed, some providers give an injection (shot) of a small amount of anesthetic (numbing medicine) at the base of the penis to block the pain or put an anesthetic cream on the penis to numb the area that will be cut.  There are 2 different ways to do a circumcision. In one type, a clamp placed around the head of the penis cuts off the blood supply to the foreskin, and the foreskin above the clamp is cut off. The clamp is left on the penis until the area heals and it falls off a few days later. In another type of circumcision, the foreskin is cut off with scissors or a scalpel.  After the circumcision, petroleum jelly and sometimes gauze may be put over the area of the penis where the skin was removed. This protects the end of the penis while it heals.  Can I keep my son s penis  if it is circumcised?  Regular washing with soap and water will keep any penis clean. Circumcision does not make the penis . Uncircumcised boys do need to be taught to clean beneath their foreskin, just like they need to be taught to wash their hands or brush their teeth.  How do I decide if I should have my son circumcised?  The American Academy of Pediatrics (AAP) says that  circumcision may have health benefits. They do not recommend circumcision for all boys as a routine procedure. The AAP recommends that you talk to your health care provider  to decide if circumcision is the right choice for your family. You may also wish to discuss the question with your family or .  What are the risks and benefits of circumcision?  We do not have a lot of good scientific information about the health risks or benefits of circumcision.  Possible Risks:  Very few baby boys (less than 1 in 100) will have a problem after circumcision, such as bleeding or mild infection of the penis. These problems are usually not serious and are easy to treat.  Less common problems are:    Removal of too much or too little foreskin  Some rare problems are:    Narrowing of the opening of the penis, which can cause problems with urination    Removal of part of the penis or death of some of the other skin on the penis   Infection that spreads to other parts of the body  People used to think babies did not really feel pain. Now we know that they do. Many baby boys appear to feel a lot of pain during circumcision if anesthesia is not used.  We do not know if circumcision affects sexual function or sensation.  Possible Benefits:    Less risk for some kinds of cancers, like cancer of the penis    Fewer urinary tract (bladder or kidney) infections for babies    Less risk for some sexually transmitted infections, such as HIV, herpes, and HPV     May protect female sexual partners from some sexually transmitted infections    For More Information  American Academy of Family Physicians: Circumcision  http://familydoctor.org/familydoctor/en/pregnancy-newborns/caring-for-newborns/infant- care/circumcision.html  MedlinePlus: Circumcision (includes a slide show on the procedure)  www.nlm.nih.gov/medlineplus/circumcision.html  American Academy of Pediatrics: Policy statement on circumcision  Http://pediatrics.aappublications.org/content/130/3/e756.abstract      Childbirth and Parenting Education:   [facility] center: http://[employer]/   (032) 921-BABY  Yris: (education,  yoga & wellness) www.[employer]  Enlightened Mama: www.enlightenedmama.com   Childbirth collective: (Parent topic nights)  www.childbirthcollective.org/  Hypnobabies:  www.hypnobabiestwincities.com/  Hypnobirthing:  Http://hypnobirthing.com/    Book Recommendations:   Aysha Windfall's Birthing From Within--first few chapters include a new-age tone, you may prefer to skip it and keep going, because there is good stuff later.  This book recommendation covers emotional preparation, but does cover coping with pain, and use of both pharmacological and nonpharmacological methods.    Dr. Cedillo' The Pregnancy Book and The Birth Book--the pregnancy book goes month-by month    Womanly Art of Breastfeeding by La Leche [employer]   Bestfeeding by Elba Mireles--great pictures    Mothering Your Nursing Toddler, by Chitra Palacio.   Addresses dealing with so many of the challenging behaviors of a nursing toddler.  How Weaning Happens, by La Leche League.  Discusses weaning at all ages, from medically necessary weaning of an infant, all the way up to age 5 (or older), with why/why not, and strategies.  Very empowering book both for deciding to wean and deciding not to.    American College of Nurse-Midwives (ACNM) http://www.midwife.org/; look at the informational handouts at http://www.midwife.org/Share-With-Women     www.mymidwife.org    Mother to Baby (Medication and Herbal guidance in pregnancy): http://www.mothertobaby.org  Toll-Free Hotline: 576.789.5660  LactMed (Medication use while breastfeeding): http://toxnet.nlm.nih.gov/newtoxnet/lactmed.htm    Women's Health.gov:  http://www.womenshealth.gov/a-z-topics/index.html    American pregnancy association - http://americanpregnancy.org    Centering Pregnancy (group prenatal care option): http://centeringhealthcare.org    Information about doulas:  Childbirth collective: http://www.childbirthcollective.org/  Doulas of [place]):  www.[place].org  [facility]  " project: http://[employer]doulaproject.com/     Early Childhood and Family Education (ECFE):  ECFE offers parents hands-on learning experiences that will nourish a lifetime of teachable moments.  http://ecfe.info/ecfe-home/    March of Dimes www.[employer]     FDA - Nutrition  www.mypyramid.gov  Under \"For Consumers,\" click on \"pregnant and breastfeeding women.\"      Centers for Disease Control and Prevention (CDC) - Vaccines : http://www.cdc.gov/vaccines/       When researching information on the web, question the validity of websites.  [employer] .gov, .edu and.org tend to be more reliable information.  If there are a lot of advertisements, be cautious of the information provided. Stay away from blogs and chat rooms please!     "

## 2021-05-31 NOTE — PROGRESS NOTES
1. Skin lesion          Plan: I reassured her on this lesion that I would not worry too much the and we can follow with serially going forward.  She can also keep an eye on it and I suggested taking a picture of it with a ruler next to it and she can compare it over the weeks and months ahead.  If things seem to change or get worse or if she gets other symptoms such as swelling beneath the door around it or redness or color change she will let us know.    Subjective: 34-year-old female new patient to our clinic who is here today with a skin lesion on her left shoulder.  She does see her midwives and has been working with that excellent group.  She is here today though with a skin lesion that she thought she should get checked out.  It is a dark mole that is on her left shoulder.  She does not think that it has been there in the past and thinks that it is new.  It does not bleed or is not irritated.  There is no surrounding redness.  She just noticed it and thought she should get it checked out.    Patient is new patient to the clinic. Please see past medical history, surgical history, social history and family history, all of which were completed in their entirety today.     Objective: Well-appearing female no acute distress.  Vital signs as noted.  Chest clear to auscultation.  Heart regular rate and rhythm.  She does have an elevated dark brown mole that is about 4 mm in diameter.  It is smooth even borders and very little color variation.  Remainder the exam is unremarkable.

## 2021-06-01 ENCOUNTER — COMMUNICATION - HEALTHEAST (OUTPATIENT)
Dept: MIDWIFE SERVICES | Facility: CLINIC | Age: 36
End: 2021-06-01

## 2021-06-01 DIAGNOSIS — O09.529 SUPERVISION OF HIGH-RISK PREGNANCY OF ELDERLY MULTIGRAVIDA: ICD-10-CM

## 2021-06-01 NOTE — PROGRESS NOTES
[facility] Discharge Summary  Patient Name: Divina MORALES See  Date: 11/19/2019  Referral Diagnosis: carpal tunnel syndrome  Referring provider: Gloria Smith A*  Visit Diagnosis:   1. Intermittent pain and swelling of hand     2. Decreased activities of daily living (ADL)     3. Bilateral carpal tunnel syndrome         Goal status: Unable to assess as patient did not return.    Patient was seen for 1 visit. The patient discontinued therapy, did not return.    The patient will need a new referral to resume.    Thank you for your referral.  Aditi ST Walters  11/19/2019  8:37 AM        [facility] Upper Extremity Initial Evaluation    Patient Name: Divina MORALES See  Date of evaluation: 9/26/2019  Referral Diagnosis: carpal tunnel syndrome  Referring provider: Gloria Smith A*  Visit Diagnosis:     ICD-10-CM    1. Intermittent pain and swelling of hand M79.643     M79.89    2. Decreased activities of daily living (ADL) R68.89    3. Bilateral carpal tunnel syndrome G56.03        Assessment:      Pt. is appropriate for skilled OT intervention as outlined in the Plan of Care (POC).    Goals:  Patient Will Demonstrate / Verbalize independence in self-management of condition in: 2 weeks  Patient will be independent with home exercise program in: 2 weeks  Patient will be able to: lift;carry;for housework;with no pain;in 12 weeks  Patient will be able to  & pinch: for meal prep;with no pain;in 12 weeks  Patient will improve hand/finger coordination for: writing;typing;with no pain;in 12 weeks      Patient's expectations/goals are realistic.    Barriers to Learning or Achieving Goals:  No Barriers.       Plan / Patient Instructions:        Plan of Care:   Communication with: Referral Source  Patient Related Instruction: Nature of Condition;Treatment plan and rationale;Body mechanics;Posture;Precautions;Basis of treatment;Expected outcome  Times per Week: 1  Number of Weeks: 12  Number of  Visits: 12  Therapeutic Exercise: Stretching  Neuromuscular Reeducation: kinesio tape  Manual Therapy: soft tissue mobilization;other  Manual Therapy: edema massage  Functional Training (ADL's): ergonomics;compensatory training  Orthotic Fitting: OTC         Subjective:        Social information:   Occupation: retail stylist   Work Status:Working part time     History of Present Illness:    Divina is a 34 y.o. female who presents to therapy today with complaints of bilateral wrist pain, weakness and numbness and tingling in bilateral fingers. Date of onset/duration of symptoms is May 2019. Onset was gradual with current pregnancy. She is due in November 2019. Symptoms are getting worse. She reports a very mild history of similar symptoms. She describes their previous level of function as not limited. Functional limitations are described as occurring with gripping, pinching, lifting, carrying for ADL's and IADL's.     Pain rating at rest: 3  Pain rating with activity: 8         Objective:      Note: Items left blank indicates the item was not performed or not indicated at the time of the evaluation.     Patient Outcome Measures: No data recorded    Upper Extremity Examination:  1. Intermittent pain and swelling of hand     2. Decreased activities of daily living (ADL)     3. Bilateral carpal tunnel syndrome       Precautions/Restrictions: None  Involved side: Bilateral  Atrophy:  Absent  Color: Normal  Temperature: Normal  Edema: Minimal  Palpation: No tenderness.   Scar: NA  Guarded extremity: Minimal.  Sensory: numbness and tingling in fingers    May benefit from PT evaluation: No    U/E orthosis currently: yes and patient will wear at night and as needed during the day    Plan for next visit: consider edema massage (instruct and perform)    Treatment Today:  Patient instructed on proper ergonomics and activity modification to avoid aggravating symptoms. Discussed working on laptop at length.Applied and instructed  "on kinesiotape from right PIP to mid forearm (both volar and dorsal).  She was instructed on median nerve glide(carpal tunnel sliders). Patient fitted with Yoder 3/4 finger compression glove to 2-3 \" past wrist. She will wear as needed and was instructed on precautions. Patient will return if symptoms do not improve.  TREATMENT MINUTES COMMENTS   Evaluation 20    Self-care/ Home management 15 Ergonomics, act. mod   Manual therapy 8 Edema glove   Neuromuscular Re-education 10 Kinesiotape, nerve glide   Orthotic fitting     Therapeutic Exercises     Iontophoresis           Total 53    Blank areas are intentional and mean the treatment did not include these items.     GOALS AND PLAN OF CARE WERE ESTABLISHED IN COOPERATION WITH THE PATIENT    OT Evaluation Code: (Please list factors)   Comorbidities:   Patient Active Problem List   Diagnosis     Teratoma of ovary     Hypothyroidism     BMI 29.0-29.9,adult     Genetic screening     Excessive weight gain affecting pregnancy     Encounter for supervision of normal first pregnancy in third trimester     Carpal tunnel syndrome during pregnancy       Profile/History Review: Brief    Need for eval modification: No    # Treatment options: Limited    Clinical Decision Making:  Low      Occupational Profile/ Medical and Therapy History and Comorbidities Occupational Performance Clinical Decision Making   (Complexity)   brief history with review of medical/therapy records related to the presenting problem.  No comorbidities 1-3 Performance deficits that result in activity limitations and/or participation restrictions.    No Assessment Modification  Low complexity, which includes  problem-focused assessments, and consideration of a limited number of treatment options.      expanded review of medical/therapy records and additional review of physical, cognitive and psychosocial history.    May have comorbidities 3-5 Performance deficits that result in activity limitations and/or " participation restrictions.    Minimal to moderate modification of assessment Moderate complexity, which includes analysis of data from detailed assessments, and consideration of several treatment options.         Review of medical/therapy records and extensive additional review of physical, cognitive and psychosocial history.  Comorbidities affect occupational performance 5 or more Performance deficits that result in activity limitations and/or participation restrictions.    Significant modification of assessment High complexity, analysis of  Occupational profile and data,  Comprehensive assessments, multiple treatment options.            Aditi Walters  9/26/2019  12:33 PM

## 2021-06-01 NOTE — PROGRESS NOTES
"Divina presents to the clinic alone.  28-week lab results WNL and reviewed.  Accepting of influenza vaccination today.  Baby is active, and she denies regular uterine contractions, loss of fluid or vaginal bleeding.  Working part-time for \"stitch [employer]\".  Plans to take about 6 weeks off after baby's arrival.  32-week pregnancy checklist completed.  (Please see details under pregnancy diagnosis on problem list.)  Concerns about anxiety level.  LG-7: 5, somewhat difficult.  EPDS: .  Referred for counseling and therapy.  Discussed the role of medication therapy, but patient declines at this time.  Experiencing symptoms of carpal tunnel.  Wearing wrist braces every night and during the day.  Upper extremity digital numbness becoming debilitating when attempting to work at the computer/typing.  Referred to physical therapy.  Plans to attend childbirth education class this weekend at [facility].   labor precautions and danger signs and symptoms reviewed.  All questions answered.  Encouraged daily fetal movement counting and to call or return to clinic with any questions, concerns, or as needed.  "

## 2021-06-01 NOTE — PROGRESS NOTES
Divina presents to the clinic by herself.  Feeling much better this visit.  Called insurance plan and confirmed 3 counseling sessions.  Given information regarding pregnancy and postpartum counseling.  First appointment at occupational therapy for carpal tunnel syndrome of pregnancy 2019.  Baby is active, and she denies regular uterine contractions, loss of fluid or vaginal bleeding.   labor precautions and danger signs and symptoms reviewed.  All questions answered.  Encouraged daily fetal movement counting and to call or return to clinic with any questions, concerns, or as needed.

## 2021-06-01 NOTE — PATIENT INSTRUCTIONS - HE
Patient Education   HEALTHY PREGNANCY CARE: 30-34 WEEKS PREGNANT    You have made it to the final months of your pregnancy. By now, your baby is starting to fill out with some fat under his skin, fuzzy hair on his shoulders, and is gaining 4 to 6 ounces per week.    Discuss any travel plans with your midwife or physician.    Review possible changes in sexuality during later pregnancy and discuss these with your midwife or physician, as well as your partner. Alternative love-making positions may be more comfortable.    Discuss labor and delivery issues with your midwife or physician. If you had a  birth in the past, discuss a trial of labor with your midwife or physician. He or she may ask that you sign a consent form, if you wish to have a vaginal birth after  (). Ask your midwife or physician to explain your options for managing pain during your labor and delivery. Sometimes, during the birth process, an episiotomy may need to be cut in the vagina to make the opening bigger or let the baby come out quicker. You may want to discuss the episiotomy and how often it is needed with your midwife or physician.    Plan for your baby's care by selecting a child health care provider (Family physician, Pediatrician, or Pediatric Nurse Practitioner). Practice installing an infant car seat correctly in the car. Ask for car seat information as needed and make sure it is safe and will work in the car your baby will ride in. You will need a car seat to bring your baby home from the hospital. Check the procedure for adding your baby to your health care plan. Review your decision about circumcision and ask for any information you need. As you buy and receive items for your baby, don't put a baby walker on your list. Walkers can be dangerous and can cause serious injury to your child. A safer option is a saucer-type play station, since it doesn't allow baby to travel across the floor.    Discuss your choices and  plans for birth control with your midwife or physician. Women who are breastfeeding can still become pregnant. Use a birth control method if you want to lower your pregnancy risk. Talk to your midwife or physician if you are considering permanent birth control, such as tubal ligation or Essure. You may need to complete a consent form 30 days prior to delivery in order to have this done after you deliver.    Continue to watch for signs and symptoms of preeclampsia:     Sudden swelling of your face, hands, or feet     New vision problems such as blurring, double vision, or flashing lights    A severe headache not relieved with acetaminophen (Tylenol)    Sharp or stabbing pain in your right or middle upper abdomen    Watch for signs and symptoms of premature labor:     Regular contractions. This means having about 6 or more within 1 hour, even after you have had a glass of water and are resting.     A backache that starts and stops regularly.    An increase or change in vaginal discharge, such as heavy, mucus-like, watery, or bloody discharge.     Your water breaks or leaks.    If you have any of the above symptoms or any other concerns, call your provider or their clinic staff at [facility] at Phone: [phone]. If it's after clinic hours, physician patients should call the [facility] at [phone]); midwife patients should call their answering service at [phone].    How can you care for yourself at home?   You can refer to the Starting Out Right book or find it online at [email or web address] or [email or web address]/images/[email or web address]/[email or web address]-starting-out-[email or web address]-starting-out-right.html#p=[email or web address]     You can sign up for a weekly parenting e-mail that gives support, tips and advice from health care professionals that starts with pregnancy and continues through the toddler years. To register, go to  [email or web address] at any time during your pregnancy.     Patient Education   HEALTHY PREGNANCY CARE: 34-36 WEEKS PREGNANT    Your baby is gaining about an ounce each day, so healthy nutrition and rest are still very important. Learn about late pregnancy symptoms, such as constipation and backaches. Drinking more fluids and eating more fiber can relieve constipation. The pelvic tilt and a heating pad can ease backaches.    Continue to watch for signs and symptoms of preeclampsia:     Sudden swelling of your face, hands, or feet     New vision problems such as blurring, double vision, or flashing lights    A severe headache not relieved with acetaminophen (Tylenol)    Sharp or stabbing pain in your right or middle upper abdomen    You're almost done with your pregnancy but it's still too soon to have your baby. The goal is to have your baby after 37 weeks, so watch for signs and symptoms of premature labor:     Regular contractions. This means having about 6 or more within 1 hour, even after you have had a glass of water and are resting.     A backache that starts and stops regularly.    An increase or change in vaginal discharge, such as heavy, mucus-like, watery, or bloody discharge.     Your water breaks or leaks.    You will be tested for group B streptococcus (GBS), a type of bacteria found in 10-30% of pregnant women. A woman with GBS can pass it to her baby during delivery. Most babies who get GBS from their mothers do not have any problems, but some babies get very ill and need to be hospitalized for antibiotic treatment. Treating you with antibiotics during labor and delivery helps to prevent infection in your baby.    Review information on postpartum care that you will need after the baby is born.  Discuss your choices and plans for birth control with your midwife or physician.     Postpartum Care  During the days and weeks after the delivery of your baby (postpartum period), your body will change as  "it returns to its nonpregnant condition. As with pregnancy changes, postpartum changes are different for every woman.    Physical changes after childbirth  The changes in your body may include:    Contractions called afterpains shrink the uterus for several days after childbirth. Shrinking of the uterus to its prepregnancy size may take 6 to 8 weeks.    Sore muscles (especially in the arms, neck, or jaw) are common after childbirth. This is because of the hard work of labor and pushing your baby out. The soreness should go away in a few days.    Bleeding and vaginal discharge (lochia) may last for 2 to 8 weeks, and can come and go for about 2 months.    Vaginal soreness, including pain, discomfort, and numbness, is common after vaginal birth. Soreness may be worse if you had a perineal tear or episiotomy.    If you had a  birth (), you may have pain in your lower abdomen and may need pain medicine for 1 to 2 weeks.    Breast engorgement is common around the 3rd or 4th day after delivery, when the breasts begin to fill with milk. This can cause discomfort and swelling. If you are breast feeding, the best treatment is to feed your baby often or pump the milk out. You can also use warm compresses. If you are not breast feeding, placing ice packs on your breasts, taking a hot shower, or using warm compresses may relieve the discomfort.    Be aware of postpartum depression    \"Baby blues\" are common for the first 1 to 2 weeks after birth. You may cry or feel sad or irritable for no reason.     For some women, these feelings last longer and are more intense. This is called postpartum depression.     If your symptoms last for more than a few weeks or you feel very depressed, ask your midwife or physician for help.     Postpartum depression can be treated. Support groups and counseling can help, but sometimes medication is needed.     Discuss follow-up appointments with your midwife or physician, as well. " He or she will usually want to see you 6 weeks after the birth of your baby, sooner if you are having problems.    If you have questions about any symptoms you are experiencing or any other concerns, call your provider or their clinic staff at [facility] at Phone: [phone]. If it's after clinic hours, physician patients should call the [facility] at [phone]); midwife patients should call their answering service at [phone].    How can you care for yourself at home?   You can refer to the Starting Out Right book or find it online at [email or web address]/[email or web address]://[email or web address]/[email or web address]/maternity/[facility]-[email or web address]-Right.pdf or http://www.[facility].org/images/stories/flipbooks/[facility]-starting-out-right/[facility]-starting-out-right.html#p=8     You can sign up for a weekly parenting e-mail that gives support, tips and advice from health care professionals that starts with pregnancy and continues through the toddler years. To register, go to [email or web address] at any time during your pregnancy.        Making Plans for Feeding My Baby    By this point, you probably have read a lot about feeding your baby.  Breastfeed or formula? Each mother s decision is her own and [facility] respects you and your choices. We ve gathered information on both breastfeeding and formula feeding to help with your decision. Talking with your physician or nurse-midwife can also help in your decision.  However you plan to feed your baby, [facility] encourage rooming in with your baby, skin-to-skin contact and feeding your baby based on his or her cues.    Skin-to-skin contact  Being close to mom helps your baby adjust to life outside of the womb.  It helps your baby regulate their body temperature, heart rate, and breathing.  Your baby will usually be placed skin-to-skin immediately following birth or as soon as possible, if medical  intervention is needed.    Rooming-In  Having your baby stay with you in your room is called  rooming-in .  Keeping your baby in your room helps you to learn how to care for your baby by getting to know your baby s cues, body rhythms and sleep cycle.       Cue-based feeding  Cues (signals) are baby s way of telling you what he or she wants.  When you learn your infant s cues, you know how to care for and feed your baby.   Feeding cues are the licking and smacking of lips, bringing their fist to their mouth, and a reflex called  rooting - where baby turns and opens his or her mouth, searching for the breast or bottle.  Crying is a late feeding cue.  Babies can feed frequently, often at least 8 times in 24 hours.  Breastfeeding facts  Breast milk is the best source of nutrition for your baby and is available at birth. In the first couple of days, your milk volume is already starting to increase, though it may not be noticeable. Breastfeed frequently to increase your milk supply. Within three to five days, you will begin to notice larger milk volumes. An increase in breast size, heaviness and firmness are often described as the milk  coming in.  Frequent breastfeeding can help breasts from getting overly firm and painful. You will know the baby is getting enough milk if your baby is having wet and dirty diapers and gaining weight.     If your goal is to exclusively breastfeed, it is important to not use any formula or artificial nipples (including bottles and pacifiers) while your baby is learning to breastfeed.  While it may seem like an  easy  option to give your baby a bottle, formula should only be given if there is a medical reason for your baby to have it.    Positioning and attachment   Get comfortable.  Use pillows as needed to support your arms and baby.  Hold baby close at the level of your breast, facing you in a tummy to tummy position.  Skin to skin helps with this.  Position the baby with his or her nose  by the nipple.  There should be a straight line from baby s ear to shoulder to hips.  Tickle your baby s lips or wait for baby to open mouth wide, bring baby to breast by leading with the chin.  Aim the nipple at the roof of baby s mouth.  A rapid sucking pattern is followed by longer, drawing pattern with occasional swallows heard.  When baby is correctly latched, your nipple and much of the areola are pulled well into baby s mouth.      Returning to work or school  Focus on a good start to breastfeeding.  Many women continue to provide breastmilk for their baby when they return to work or school.  Making plans about where to pump and store milk can make the transition go well.  Talk with other mothers who have also returned to work or school for tips and support.  Your employer s Human Resource department may be a resource as well.     Returning to work or school: (continued)     babies can mean fewer  sick  days for you.    A quality breast pump will also save time and add comfort.  Check with your insurance prior to giving birth for breast pump coverage.  Many insurance companies include a pump within your benefits.    Wait until your baby is at least three weeks old to introduce a bottle for the first time.  Have someone besides you give the bottle.    Breastfeed when you are with your baby. Reserve your bottles of breast milk for when you are away.     Your breasts will need to be  emptied  either by your baby or a pump.  Plan to pump at least twice in an eight hour day.    If you cannot pump at work, continue breastfeeding at home. Any amount of breast milk is worth giving to your baby.    Formula feeding facts  If you are planning to use formula to feed your baby, you will want to make some preparations ahead of time. Talk to your doctor or nurse-midwife about what type of formula to use. Some are iron-fortified, meaning they have extra iron in them. You will want to purchase formula and bottles  before your baby is born to be sure you are ready after you return from the hospital. [facility] do not provide formula samples to take home.    Be sure to follow formula mixing directions closely. Regular milk in the dairy case at the grocery store should not be given to babies under 1 year old. Baby formula is sold in several forms including:    Ready-to-use. This is the most expensive, but no mixing is necessary.    Concentrated liquid. This is less expensive than ready-to-use and you mix with water.    Powder. This is the least expensive. You mix one level scoop of powdered formula with two ounces of water and stir well.    Most babies need 2.5 ounces of formula per pound of body weight each day. This means an 8-pound baby may drink about 20 ounces of formula a day; however, this is just an estimate. The most important thing is to pay attention to your baby s cues.  If your baby is always fussy, needs more iron or has certain food allergies, your physician may suggest you change your baby s formula to a different kind.   How do I warm my baby s bottles?  You may feed your baby a bottle without warming it first. It is OK for the breast milk or formula to be cool or room temperature. If your baby seems to prefer it warmed, you can put the filled bottle in a container of warm water and let it stand for a few minutes. Check the temperature of the liquid on your skin before feeding it to your baby; to be sure it isn t too hot. Do not heat bottles in the microwave. Microwaves heat food and liquids unevenly, and this can cause hot spots that can burn your baby.    How do I clean and sterilize bottles?  Sterilize bottles and nipples before you use them for the first time. You can do this by putting them in boiling water for 5 minutes. After that first time, you can wash them in hot and soapy water. Rinse them carefully to be sure there is no soap left on them. You can also wash them in the  star.    [facility] [phone] ([phone])     Patient Education   HEALTHY PREGNANCY CARE: 37 to 41 WEEKS PREGNANT    Talk with your midwife or physician about when to call with signs of labor    Regular uterine contractions that are getting closer together and/or stronger    If you think your water has broken or is leaking    Bleeding from the vagina like a period (bloody vaginal discharge is normal)    If you are not feeling your baby move    Make plans for transportation and  as needed for when you are going to the hospital.    Your midwife or physician may offer to check your cervix for changes.     Ask your health care provider about vaccinations you may need following delivery. By now, you should have received a Tdap immunization to protect against pertussis or whooping cough. Fathers and family members who will be in close contact with the baby should also receive a Tdap shot at least two weeks before the expected birth of the baby if they have not had a Td (tetanus) shot for at least two years.    If you are past your due date, discuss the next steps leading to delivery with your midwife or physician. If you don't start labor on your own by 41 or 42 weeks, your midwife or physician may recommend giving you medicines to ripen your cervix and start labor.    Preparing for your baby: Tell your midwife or physician how you plan to feed your baby (breast or bottle), who you have chosen to do pediatric care for your baby, and if you have a boy, whether you have chosen to have him circumcised. You will need a car seat correctly installed in your vehicle to bring your baby home. As you start to set up the nursery at home for your baby, make sure the crib is safe. The mattress needs to fit snugly against the edges of the crib. If you can fit a soda can between the bars, they are too far apart and can allow the baby's head to caught between them.    Learn about infant care and feeding,  including information about infant CPR. We recommend that you put your baby to sleep on his or her back to reduce the chance of Sudden Infant Death Syndrome (SIDS). To maintain a healthy environment in which your child can grow, it's best to keep your home smoke-free. By preparing ahead, your transition into parenthood will go smoothly for you and your baby.    Your midwife or physician will want to see you for a checkup 2 to 6 weeks after delivery.    If you have questions about any symptoms you are experiencing or any other concerns, call your provider or their clinic staff at [facility] at Phone: [phone]. If it's after clinic hours, physician patients should call the [facility] at [phone]); midwife patients should call their answering service at [phone].    How can you care for yourself at home?   You can refer to the Starting Out Right book or find it online at [email or web address]/images/[email or web address]/maternity/[facility]-Starting-Out-Right.pdf or http://www.[email or web address]/images/stories/flipbooks/[facility]starting[facility]out[facility]-starting-out-right.html#[email or web address]     You can sign up for a weekly parenting e-mail that gives support, tips and advice from health care professionals that starts with pregnancy and continues through the toddler years. To register, go to [email or web address] at any time during your pregnancy.        Making Plans for Feeding My Baby    By this point, you probably have read a lot about feeding your baby.  Breastfeed or formula? Each mother s decision is her own and [facility] respects you and your choices. We ve gathered information on both breastfeeding and formula feeding to help with your decision. Talking with your physician or nurse-midwife can also help in your decision.  However you plan to feed your baby, [facility] encourage rooming in with your baby, skin-to-skin contact and feeding your baby based on his or  her cues.    Skin-to-skin contact  Being close to mom helps your baby adjust to life outside of the womb.  It helps your baby regulate their body temperature, heart rate, and breathing.  Your baby will usually be placed skin-to-skin immediately following birth or as soon as possible, if medical intervention is needed.    Rooming-In  Having your baby stay with you in your room is called  rooming-in .  Keeping your baby in your room helps you to learn how to care for your baby by getting to know your baby s cues, body rhythms and sleep cycle.       Cue-based feeding  Cues (signals) are baby s way of telling you what he or she wants.  When you learn your infant s cues, you know how to care for and feed your baby.   Feeding cues are the licking and smacking of lips, bringing their fist to their mouth, and a reflex called  rooting - where baby turns and opens his or her mouth, searching for the breast or bottle.  Crying is a late feeding cue.  Babies can feed frequently, often at least 8 times in 24 hours.  Breastfeeding facts  Breast milk is the best source of nutrition for your baby and is available at birth. In the first couple of days, your milk volume is already starting to increase, though it may not be noticeable. Breastfeed frequently to increase your milk supply. Within three to five days, you will begin to notice larger milk volumes. An increase in breast size, heaviness and firmness are often described as the milk  coming in.  Frequent breastfeeding can help breasts from getting overly firm and painful. You will know the baby is getting enough milk if your baby is having wet and dirty diapers and gaining weight.     If your goal is to exclusively breastfeed, it is important to not use any formula or artificial nipples (including bottles and pacifiers) while your baby is learning to breastfeed.  While it may seem like an  easy  option to give your baby a bottle, formula should only be given if there is a  medical reason for your baby to have it.    Positioning and attachment   Get comfortable.  Use pillows as needed to support your arms and baby.  Hold baby close at the level of your breast, facing you in a tummy to tummy position.  Skin to skin helps with this.  Position the baby with his or her nose by the nipple.  There should be a straight line from baby s ear to shoulder to hips.  Tickle your baby s lips or wait for baby to open mouth wide, bring baby to breast by leading with the chin.  Aim the nipple at the roof of baby s mouth.  A rapid sucking pattern is followed by longer, drawing pattern with occasional swallows heard.  When baby is correctly latched, your nipple and much of the areola are pulled well into baby s mouth.      Returning to work or school  Focus on a good start to breastfeeding.  Many women continue to provide breastmilk for their baby when they return to work or school.  Making plans about where to pump and store milk can make the transition go well.  Talk with other mothers who have also returned to work or school for tips and support.  Your employer s Human Resource department may be a resource as well.     Returning to work or school: (continued)     babies can mean fewer  sick  days for you.    A quality breast pump will also save time and add comfort.  Check with your insurance prior to giving birth for breast pump coverage.  Many insurance companies include a pump within your benefits.    Wait until your baby is at least three weeks old to introduce a bottle for the first time.  Have someone besides you give the bottle.    Breastfeed when you are with your baby. Reserve your bottles of breast milk for when you are away.     Your breasts will need to be  emptied  either by your baby or a pump.  Plan to pump at least twice in an eight hour day.    If you cannot pump at work, continue breastfeeding at home. Any amount of breast milk is worth giving to your baby.    Formula  feeding facts  If you are planning to use formula to feed your baby, you will want to make some preparations ahead of time. Talk to your doctor or nurse-midwife about what type of formula to use. Some are iron-fortified, meaning they have extra iron in them. You will want to purchase formula and bottles before your baby is born to be sure you are ready after you return from the hospital. The [facility] do not provide formula samples to take home.    Be sure to follow formula mixing directions closely. Regular milk in the dairy case at the grocery store should not be given to babies under 1 year old. Baby formula is sold in several forms including:    Ready-to-use. This is the most expensive, but no mixing is necessary.    Concentrated liquid. This is less expensive than ready-to-use and you mix with water.    Powder. This is the least expensive. You mix one level scoop of powdered formula with two ounces of water and stir well.    Most babies need 2.5 ounces of formula per pound of body weight each day. This means an 8-pound baby may drink about 20 ounces of formula a day; however, this is just an estimate. The most important thing is to pay attention to your baby s cues.  If your baby is always fussy, needs more iron or has certain food allergies, your physician may suggest you change your baby s formula to a different kind.   How do I warm my baby s bottles?  You may feed your baby a bottle without warming it first. It is OK for the breast milk or formula to be cool or room temperature. If your baby seems to prefer it warmed, you can put the filled bottle in a container of warm water and let it stand for a few minutes. Check the temperature of the liquid on your skin before feeding it to your baby; to be sure it isn t too hot. Do not heat bottles in the microwave. Microwaves heat food and liquids unevenly, and this can cause hot spots that can burn your baby.    How do I clean and sterilize  bottles?  Sterilize bottles and nipples before you use them for the first time. You can do this by putting them in boiling water for 5 minutes. After that first time, you can wash them in hot and soapy water. Rinse them carefully to be sure there is no soap left on them. You can also wash them in the .    [facility] [phone])

## 2021-06-01 NOTE — TELEPHONE ENCOUNTER
Divina paged the CNM number tonight with concerns about her right thigh. Yesterday after some intense physical exertion (shoveling), she noticed her right thigh felt hot-- but was not hot to the touch, and no red area noted. After she sat down and rested, the feeling went away. This happened again today after exercising, and again the feeling went away after resting. No redness noted, not hot to the touch. Reassurance provided. Encouraged patient to call back if area was warm to the touch, redness noted, and did not resolve with rest. Baby active, no contractions or LOF. Will keep appointment with Gloria Smith CNM next week. Patient verbalized understanding and agreement with this plan of care.    Subha Zhang CNM

## 2021-06-02 NOTE — TELEPHONE ENCOUNTER
"TC from on-call REYES:  Divina is a 33 yo  at 38/0 weeks. Divina's baby was transverse, she scheduled an ECV with YUMIKO Barber and the baby was vertex. She then had a f/u visit which showed baby is now rebel breech.   Spoke with Madison with [facility] who states that YUMIKO Sunil will be at [facility] on 10/31/19 and can do another ECV then (Divina will be 39/3 weeks).  She also is wondering if Divina would like an induction following her version.   Discussed with Divina and Madison that \"unstable fetal lie\" is no longer an indication for an induction and that if she chooses to be induced following her ECV we will need to have that approved by the Chief OBGYN Lawanda Blancas. Discussed that at 39 weeks she is less likely to have her baby turn again, and that it might make sense for her to await spontaneous labor once her baby is vertex.  Will send this note to Gloria Smith CNM, who will be seeing Divina this week for her routine OB visit. Gloria can assess Divina's cervix this week, and if she is ripe she would be eligible for an induction. If she is not ripe and Divina and Gloria decide that an induction following the ECV is best, Gloria will need to get approval from Dr. Blancas.     Regardless Divina would like to set up an ECV with Dr. Barber 10/31/19. She will discuss whether or not to schedule an induction with REYES Castro, this week during her routine OB visit.     DANELLE Rodriguez CNM    "

## 2021-06-02 NOTE — TELEPHONE ENCOUNTER
Called and left another message on Madison Wilder's phone number at [facility] ([phone]) with pt update and asking them to call her to let her know if she needs another consult visit or schedule ECV.

## 2021-06-02 NOTE — ANESTHESIA PROCEDURE NOTES
Epidural Block    Patient location during procedure: OB  Time Called: 11/1/2019 12:10 PM  Reason for Block:labor epidural  Staffing:  Performing  Anesthesiologist: Louis Garsia MD  Preanesthetic Checklist  Completed: patient identified, risks, benefits, and alternatives discussed, timeout performed, consent obtained, at patient's request, airway assessed, oxygen available, suction available, emergency drugs available and hand hygiene performed  Procedure  Patient position: sitting  Prep: ChloraPrep and site prepped and draped  Patient monitoring: blood pressure, heart rate and continuous pulse oximetry  Approach: midline  Location: L2-L3  Injection technique: QUE saline  Number of Attempts:1  Needle  Needle type: [employer]   Needle gauge: 18 G     Catheter in Space: 5  Assessment  Sensory level: T10  No complications      Additional Notes:  5cc 1% lidocaine skin, negative aspiration , negative test dose of 4cc 1.5% PF lidocaine with epi 1/200

## 2021-06-02 NOTE — ANESTHESIA PREPROCEDURE EVALUATION
Anesthesia Evaluation      Patient summary reviewed   No history of anesthetic complications     Airway   Mallampati: II  Neck ROM: full   Pulmonary - negative ROS    breath sounds clear to auscultation  (-) pneumonia, asthma, shortness of breath, recent URI, sleep apnea, not a smoker                         Cardiovascular   Exercise tolerance: > or = 4 METS  (-) angina  Rhythm: regular  Rate: normal,         Neuro/Psych    (+) neuromuscular disease,      Endo/Other    (+) hypothyroidism, pregnant  (-) no diabetes     GI/Hepatic/Renal            Dental - normal exam                        Anesthesia Plan  Planned anesthetic: epidural    ASA 2     Anesthetic plan and risks discussed with: patient    Post-op plan: routine recovery

## 2021-06-02 NOTE — PROGRESS NOTES
"Divina presents to the clinic alone.  EPDS: .  Baby is active, and she denies regular uterine contractions, loss of fluid or vaginal bleeding.  Took labor and birth education class with Theresa.  Concerned she does not see colostrum on her undergarments, reassured.  Carpal tunnel symptoms improved with wrist splints but they are \"falling apart\", prescription given for bilateral wrist splints.  Continue occupational therapy.  Undecided regarding  pediatric provider.  Requesting postpartum home visit.  Plans to notify friends and family to update Tdap.  Next visit: GBS RV culture, hemoglobin and thyroid cascade.  32-week pregnancy checklist completed.   labor precautions and danger signs and symptoms reviewed.  All questions answered.  Encouraged daily fetal movement counting and to call or return to clinic with any questions, concerns, or as needed.  "

## 2021-06-02 NOTE — TELEPHONE ENCOUNTER
LVM with procedure  to talk with TRISH Barber to see what next steps should be.  This writer would recommend [facility] contact pt to see what her interest level is in ECV or what questions she has about the procedure, then can schedule as needed.  Will await call back.

## 2021-06-02 NOTE — PROGRESS NOTES
"Divina presents to the clinic by herself.  Concerned about \"boils that I sometimes get in my groin area which now are near my [teratoma removal] incision.\"  Patient states that she has struggled most of her life with painful bumps in her groin area.  States it is uncomfortable and wonders about treatment.  There are a number of erythematous furuncles which speckle her low transverse abdominal scar, some raised, nonvesicular and without purulent discharge.  The appearance is that of hydradenitis suppurativa.  Clindamycin 1% solution to be applied twice daily, 30 mL.  Use extra strength Tylenol as directed for discomfort.  Use Dial soap and keep dry.  Call or RTC if symptoms worsen.  Labs today: GBS RV culture, hemoglobin, TSH, free T3, free T4.  Due to discomfort of hydradenitis suppurativa suprapubically, this writer was unable to determine by Leopold's whether baby is in a longitudinal lie/cephalic presentation.  Cervical examination did not make this any clear due to a closed cervix and a high presenting part.  Sent for ultrasound for fetal presentation.  Baby has been active, and she denies regular uterine contractions, loss of fluid or vaginal bleeding.   labor precautions and danger signs and symptoms reviewed.  All questions answered.  Encouraged daily fetal movement counting and to call or return to clinic with any questions, concerns, or as needed.  "

## 2021-06-02 NOTE — PROGRESS NOTES
Divina presents to [facility] clinic for her routine PNV 37 wks. Feeling Well in general. Saw Dr. TRISH Barber for consult because baby was transverse but pt said she did inversions and then baby turned to be vtx (spinning babies stuff). [facility] and labor instructions reviewed. Now baby is transverse again and US ordered. May need plan with Dr. YUMIKO Barber for consult/version after US.

## 2021-06-02 NOTE — PROGRESS NOTES
"ADDENDUM: Dr. Blancas approves IOL for unstable lie at 39+3 weeks (unfavorable/unripened cervix).  Divina presents to the clinic by herself.  Baby was moving on both of her sides 4 days ago, but now she feels more kicks under her ribs and more pressure on her bladder believing baby to be head down.  She resumed \"spinning babies\" exercises after baby flipped the second time.  Expresses trepidation about waiting for spontaneous labor in the setting of unstable fetal lie.  Ultrasound scheduled to verify fetal presentation and MAGNOLIA today.  ECV scheduled for 7:30 AM at [facility] on Thursday, 10/31/2019 with Dr. Alessandro Barber.  This writer will discuss the possibility of cervical ripening/IOL in the setting of unstable lie at 39 weeks 3 days with Dr. Lawanda Blancas and return call to patient.  Baby has been active, and she denies regular uterine contractions, loss of fluid or vaginal bleeding.  Small amount of bloody show on examiner's glove after SVE today.  Term labor precautions and danger signs and symptoms reviewed.  All questions answered.  Encouraged daily fetal movement counting and to call or return to clinic with any questions, concerns, or as needed.  "

## 2021-06-02 NOTE — TELEPHONE ENCOUNTER
Dr. Ribera called to give result of rebel savage.  Pt has seen Dr Barber recently for transverse lie.  Will call [facility] tomorrow to ask about another consult vs ECV.  Will then coordinate with pt or have [facility] call to coordinate with pt.  Attempted to call pt with results.  LVM.

## 2021-06-02 NOTE — TELEPHONE ENCOUNTER
Pt notified of US result showing breech.  Will call [facility] in AM and figure out next steps.  Pt unsure of ECV or PCS.

## 2021-06-02 NOTE — ANESTHESIA POSTPROCEDURE EVALUATION
Patient: Divina MORALES See  * No procedures listed *  Anesthesia type: epidural    Patient location:    Last vitals: No vitals data found for the desired time range.    Post vital signs: stable  Level of consciousness: awake and responds to simple questions  Post-anesthesia pain: pain controlled  Post-anesthesia nausea and vomiting: no  Pulmonary: unassisted, return to baseline  Cardiovascular: stable and blood pressure at baseline  Hydration: adequate  Anesthetic events: None

## 2021-06-03 ENCOUNTER — PRENATAL OFFICE VISIT - HEALTHEAST (OUTPATIENT)
Dept: MIDWIFE SERVICES | Facility: CLINIC | Age: 36
End: 2021-06-03

## 2021-06-03 VITALS
HEIGHT: 68 IN | DIASTOLIC BLOOD PRESSURE: 70 MMHG | BODY MASS INDEX: 37.59 KG/M2 | HEART RATE: 80 BPM | WEIGHT: 248 LBS | SYSTOLIC BLOOD PRESSURE: 118 MMHG

## 2021-06-03 VITALS
SYSTOLIC BLOOD PRESSURE: 112 MMHG | HEART RATE: 76 BPM | HEIGHT: 68 IN | DIASTOLIC BLOOD PRESSURE: 72 MMHG | BODY MASS INDEX: 36.68 KG/M2 | WEIGHT: 242 LBS

## 2021-06-03 VITALS — BODY MASS INDEX: 32.28 KG/M2 | WEIGHT: 213 LBS | HEIGHT: 68 IN

## 2021-06-03 VITALS
DIASTOLIC BLOOD PRESSURE: 62 MMHG | BODY MASS INDEX: 38.19 KG/M2 | HEART RATE: 94 BPM | WEIGHT: 252 LBS | HEIGHT: 68 IN | SYSTOLIC BLOOD PRESSURE: 116 MMHG

## 2021-06-03 VITALS
DIASTOLIC BLOOD PRESSURE: 72 MMHG | SYSTOLIC BLOOD PRESSURE: 116 MMHG | BODY MASS INDEX: 36.07 KG/M2 | HEIGHT: 68 IN | WEIGHT: 238 LBS | HEART RATE: 80 BPM

## 2021-06-03 VITALS — HEIGHT: 68 IN | WEIGHT: 226.3 LBS | BODY MASS INDEX: 34.3 KG/M2

## 2021-06-03 VITALS — HEIGHT: 68 IN | BODY MASS INDEX: 34.56 KG/M2 | WEIGHT: 228 LBS

## 2021-06-03 VITALS
WEIGHT: 235 LBS | HEIGHT: 68 IN | SYSTOLIC BLOOD PRESSURE: 108 MMHG | HEART RATE: 88 BPM | BODY MASS INDEX: 35.61 KG/M2 | DIASTOLIC BLOOD PRESSURE: 62 MMHG

## 2021-06-03 VITALS — HEIGHT: 68 IN | BODY MASS INDEX: 33.65 KG/M2 | WEIGHT: 222 LBS

## 2021-06-03 VITALS — WEIGHT: 205 LBS | BODY MASS INDEX: 31.07 KG/M2 | HEIGHT: 68 IN

## 2021-06-03 VITALS
HEART RATE: 84 BPM | BODY MASS INDEX: 38.04 KG/M2 | SYSTOLIC BLOOD PRESSURE: 126 MMHG | DIASTOLIC BLOOD PRESSURE: 76 MMHG | WEIGHT: 251 LBS | HEIGHT: 68 IN

## 2021-06-03 VITALS — WEIGHT: 197 LBS | HEIGHT: 68 IN | BODY MASS INDEX: 29.86 KG/M2

## 2021-06-03 VITALS — BODY MASS INDEX: 30.64 KG/M2 | WEIGHT: 203 LBS

## 2021-06-03 DIAGNOSIS — O09.529 SUPERVISION OF HIGH-RISK PREGNANCY OF ELDERLY MULTIGRAVIDA: ICD-10-CM

## 2021-06-03 DIAGNOSIS — R82.71 GBS BACTERIURIA: ICD-10-CM

## 2021-06-03 DIAGNOSIS — O26.813 PREGNANCY RELATED FATIGUE IN THIRD TRIMESTER: ICD-10-CM

## 2021-06-03 DIAGNOSIS — Z87.59 HISTORY OF PRE-ECLAMPSIA: ICD-10-CM

## 2021-06-03 DIAGNOSIS — D27.9 TERATOMA OF OVARY, UNSPECIFIED LATERALITY: ICD-10-CM

## 2021-06-03 DIAGNOSIS — R79.0 LOW FERRITIN: ICD-10-CM

## 2021-06-03 DIAGNOSIS — O26.03 EXCESSIVE WEIGHT GAIN DURING PREGNANCY IN THIRD TRIMESTER: ICD-10-CM

## 2021-06-03 DIAGNOSIS — E03.9 HYPOTHYROIDISM, UNSPECIFIED TYPE: ICD-10-CM

## 2021-06-03 DIAGNOSIS — O09.523 MULTIGRAVIDA OF ADVANCED MATERNAL AGE IN THIRD TRIMESTER: ICD-10-CM

## 2021-06-03 LAB
FERRITIN SERPL-MCNC: 12 NG/ML (ref 10–130)
HGB BLD-MCNC: 11.2 G/DL (ref 12–16)
T4 FREE SERPL-MCNC: 0.9 NG/DL (ref 0.7–1.8)
TSH SERPL DL<=0.005 MIU/L-ACNC: 1.37 UIU/ML (ref 0.3–5)

## 2021-06-03 ASSESSMENT — MIFFLIN-ST. JEOR: SCORE: 1952.34

## 2021-06-03 NOTE — ANESTHESIA POSTPROCEDURE EVALUATION
Patient: Divina MORALES See  LAPAROSCOPY, REMOVAL OF LEFT OVARIAN CYSTECTOMY, HYSTEROSCOPY , WITH ULTRASOUND, HYSTEROSCOPY, ULTRASOUND GUIDED DILATION AND CURETTAGE  Anesthesia type: general    Patient location: PACU  Last vitals:   Vitals Value Taken Time   /73 11/15/2019  3:45 PM   Temp 37.2  C (98.9  F) 11/15/2019  3:10 PM   Pulse 63 11/15/2019  3:56 PM   Resp 16 11/15/2019  3:45 PM   SpO2 94 % 11/15/2019  3:56 PM   Vitals shown include unvalidated device data.  Post vital signs: stable  Level of consciousness: awake and responds to simple questions  Post-anesthesia pain: pain controlled  Post-anesthesia nausea and vomiting: no  Pulmonary: unassisted, return to baseline  Cardiovascular: stable and blood pressure at baseline  Hydration: adequate  Anesthetic events: no    QCDR Measures:  ASA# 11 - Marielle-op Cardiac Arrest: ASA11B - Patient did NOT experience unanticipated cardiac arrest  ASA# 12 - Marielle-op Mortality Rate: ASA12B - Patient did NOT die  ASA# 13 - PACU Re-Intubation Rate: ASA13B - Patient did NOT require a new airway mgmt  ASA# 10 - Composite Anes Safety: ASA10A - No serious adverse event    Additional Notes:

## 2021-06-03 NOTE — ANESTHESIA PREPROCEDURE EVALUATION
Anesthesia Evaluation      No history of anesthetic complications     Airway   Mallampati: I  Neck ROM: full   Pulmonary - negative ROS    breath sounds clear to auscultation                         Cardiovascular   (+) hypertension (pre-E in third trimester, resolved, no residual HTN), ,     Rhythm: regular  Rate: normal,         Neuro/Psych      Endo/Other    (+) hypothyroidism, obesity,   (-) not pregnant (14 days post partum from , now w/ retained POC s/f D&C plus laparoscopy and bilateral ovarian cystectomy)     GI/Hepatic/Renal - negative ROS           Dental - normal exam                        Anesthesia Plan  Planned anesthetic: general endotracheal  GETA   Decadron 10, Zofran 4, propofol gtt for antiemesis  Toradol 15mg at case closure if cleared by surgeon  No meperidine, currently breastfeeding     ASA 2   Induction: intravenous   Anesthetic plan and risks discussed with: patient and spouse  Anesthesia plan special considerations: antiemetics,   Post-op plan: routine recovery

## 2021-06-03 NOTE — TELEPHONE ENCOUNTER
Post Birth Follow Up PHONE CALL : Divina Goyal is a 34 y.o.  who delivered on 19 via Delivery Route: .     No answer, message left that this writer was calling to check in with her and to review her birth experience and answer any questions she may have. Informed a CNM will attempt to call her again. Also recommended postpartum follow up visit at 2-3 weeks to check in with the midwife.  Notified she can call scheduling line to set this up.     DANELLE Hartman,CNM

## 2021-06-03 NOTE — ANESTHESIA CARE TRANSFER NOTE
Last vitals:   Vitals:    11/15/19 1436   BP: 116/65   Pulse: 79   Resp: 18   Temp: 37.3  C (99.1  F)   SpO2: 100%     Patient's level of consciousness is drowsy  Spontaneous respirations: yes  Maintains airway independently: yes  Dentition unchanged: yes  Oropharynx: oropharynx clear of all foreign objects    QCDR Measures:  ASA# 20 - Surgical Safety Checklist: WHO surgical safety checklist completed prior to induction    PQRS# 430 - Adult PONV Prevention: 4558F - Pt received => 2 anti-emetic agents (different classes) preop & intraop  ASA# 8 - Peds PONV Prevention: NA - Not pediatric patient, not GA or 2 or more risk factors NOT present  PQRS# 424 - Marielle-op Temp Management: 4559F - At least one body temp DOCUMENTED => 35.5C or 95.9F within required timeframe  PQRS# 426 - PACU Transfer Protocol: - Transfer of care checklist used  ASA# 14 - Acute Post-op Pain: ASA14B - Patient did NOT experience pain >= 7 out of 10

## 2021-06-04 ENCOUNTER — AMBULATORY - HEALTHEAST (OUTPATIENT)
Dept: MIDWIFE SERVICES | Facility: CLINIC | Age: 36
End: 2021-06-04

## 2021-06-04 VITALS — WEIGHT: 229 LBS | HEIGHT: 69 IN | BODY MASS INDEX: 33.92 KG/M2

## 2021-06-04 VITALS
HEART RATE: 84 BPM | HEIGHT: 69 IN | SYSTOLIC BLOOD PRESSURE: 104 MMHG | BODY MASS INDEX: 33.33 KG/M2 | WEIGHT: 225 LBS | DIASTOLIC BLOOD PRESSURE: 60 MMHG

## 2021-06-04 NOTE — PROGRESS NOTES
6-week Postpartum Visit:     Assessment:   Normal postpartum exam at ~ 6 weeks.  Lactating mother  Contraceptive counseling  History of depression and anxiety-negative screenings today  History of D&C and ovarian cyst removal at 8 days postpartum  History of preeclampsia    Plan:   1. Adjustment to parenting, self care and importance of a support system discussed. Postpartum education given including: postpartum mood changes and postpartum depression.  She is aware that she is at risk for postpartum depression and/or anxiety due to her history.  [place]  Mental Health Resource Card given for future reference and encouraged her to consider identifying a therapist at this time.  Encouraged her to consider joining a new mom group, community resources discussed.  Urged to return to clinic or contact CN with concerns for depression or anxiety.  2. Return of fertility discussed. Plans ParaGard for contraception. Education given on this method.  IUD consult performed, discussed insertion after 2 weeks of unprotected intercourse.  Resumption of intercourse reviewed with possible changes in libido and vaginal lubrication while nursing.  3. Discussed resumption of exercise and normal timing of return to pre-pregnancy weight. Postpartum physical activity reviewed and encouraged modified abdominal crunches and Kegels daily. Encouraged integrating exercise, such as walking 20-30mns daily.   4.  Nutrition and supplements reviewed.  Advised continuation of a prenatal or multivitamin, also Vitamin D3 2000 IU geltab daily and an omega 3 fatty acid supplement.  5. Reviewed warning signs of pelvic pain, excessive bleeding or abdominal pain, fever/chills, or signs of breast infection.   6. Breastfeeding support resource list and contact info given, including [facility], [facility], local LLL groups, and new moms groups. Warning signs of breast infections (mastitis and thrush)  reviewed.  7. Discussed hx of abnormal pap smears. Although last Pap in 2019 was normal, no HPV test was done.  Since the Pap prior to that one was abnormal and HPV was present, would recommend pap with HPV 3/21/2020.      -RTC for routine health maintenance or sooner as needed.     TT with patient 40 mns, >50% time spent in counseling or coordination of care.   Maribel Esquivel, DANELLE, CNM    Subjective:    Divina Goyal is a 34 y.o. female who presents for postpartum visit. She is 6 weeks postpartum following a NSVB.  I have fully reviewed the prenatal and intrapartum course. The delivery was at Term Her baby boy is named Cody and weighed 7 lbs 2 oz at birth. She was seen at one week postpartum at [facility] with pelvic pain and bleeding. Several days later she went to [facility] with persistent sx and she had surgery to remove a left ovarian dermoid cyst, right ovarian dermoid cyst and retained products of conception. She has been feeling much better since everything has stabilized.     Baby's course has been stable. Baby is breastfeeding and got good support through her pediatric clinic, [facility], Dr. Aysha Calhoun. Lochia ceased at 4 weeks postpartum.  Bowel function is normal. Bladder function is normal. She has resumed intercourse. Desired contraception: IUD. Appetite is normal. Reports sleeping fairly well though in shifts. [place]  Depression Scale postpartum depression screening score: 4. LG-7 score today: 4     She does have a history of depression and has been very aware of her moods.  She does not have any concerns currently and is very aware that she is at higher risk due to her history of depression.    She has not resumed regular exercise. Patient returns to work in next week.     Review of Systems:  -A 12 point comprehensive review of systems was negative except as noted above.  -Prenatal history and intrapartum course were also reviewed today.    Cervical Cancer Screening  "History:  2016: abnormal pap (result not specified) with colposcopy  3/2018: ASCUS, HPV +  3/21/2019: NILM, HPV not done  3/21/2020: pap with co-testing due    Objective:      Vitals:    12/30/19 1107   BP: 104/60   Pulse: 84   Weight: (!) 225 lb (102.1 kg)   Height: 5' 9\" (1.753 m)       Physical Exam:  General Appearance: Pleasant, articulate, well-groomed, well-nourished female.  Not in any apparent distress.   Breasts: Engorgement resolved/Lactating.  Nipples intact with no cracking.  Abdomen: Soft, non-tender. No masses. 1 fb diastasis present.   Pelvic: External genitalia normal without lesions or irritation. Vagina and cervix show no lesions, inflammation, discharge or tenderness. 2nd degree perineal laceration well approximated and well healed. Uterus fully involuted.  No adnexal mass or tenderness.   Extremities: Extremities normal without varicosities or edema                 "

## 2021-06-05 NOTE — PROGRESS NOTES
IUD Insertion Procedure Note    Pre-operative Diagnosis: Contraceptive management    Post-operative Diagnosis: same    Also complaining of a rash on her lower mid back, right side x3 days which began with itching, somewhat painful.  Never had before.  No contact illnesses.  One was fluid-filled to begin with.  Had chickenpox as a child.    Indications: Contraception    HPI/ROS:   Divina Goyal is a 34 y.o. female who presents for IUD insert. LMP 1/28/2019. Current birth control method condoms. She is currently 10 weeks postpartum.     6 week postpartum exam on 12/30/2019, IUD consult done at that time.     Reports no unprotected intercourse in the last two weeks.     Urine pregnancy test was performed in clinic and was negative. Pt education included mechanism of action of Paragard IUD. The risks (including infection, bleeding, pain, and uterine perforation) and benefits of the procedure were explained to the patient and informed consent was obtained and consent form signed.     Procedure Details   Bimanual exam performed revealing an anteverted uterus, midline, non-tender, negative CMT. Cervix is parous, long, thick, closed. Sterile speculum placed. GC/CT collected. Cervix cleansed with Betadine. Tenaculum placed on cervix at 11 o'clock and 1 o'clock. Uterus sounded to 8 cm. IUD inserted without difficulty. String visible and trimmed to 3 cm. Tenaculum removed. Minimal bleeding noted. Patient tolerated procedure well. Did not have any periods of syncope, sat up and ambulated with ease.     Rash located on lower mid back, right side with vesicular lesions which have appeared to erupt on an erythematous base, a scattering of about 8 lesions resembling herpes zoster.    IUD Information:  ParaGard, Expiration date July 2025.  Lot #[id number]    Condition:  Stable    Complications:  None    Plan:  -Discussed danger signs and symptoms of the IUD including how to check for strings. Instructed patient to check her strings  monthly. Discussed when/where to call with any fever, severe back pain, severe abdominal pain, heavy bleeding (soaking more than 1 pad per hour). Also encouraged to call if she does not feel her strings. She was advised to use Ibuprofen as needed for mild to moderate pain. Manufactures information given for patient education.   -Paragard IUD should be removed by January 16, 2030.  -Encouraged to return to clinic in 4-6 weeks for IUD check or sooner prn.     Maddy acyclovir 1000 mg 1 p.o. 3 times daily x7 days, #21, no refills    Total time with patient 40 mn >50% time spent in counseling or coordination of care.

## 2021-06-06 NOTE — PROGRESS NOTES
[facility]  ENDOCRINOLOGY    Thyroid Note  2/29/2020    Divina Goyal, 1985, [id number]          Reason for visit      1. Other specified hypothyroidism        HPI     Divina Goyal is a very pleasant 34 y.o. old female who presents for follow up.  SUMMARY:    Divina is here today at the behest of her CMN for what is currently very suppressed TSH.  She notes that she was diagnosed as having hyperthyroidism when she was 19 or 20. She was on medication for a while and then went without as her chemistry stabilized.  She is 15 weeks postpartum, and reports that she was put on Levothyroxine during her pregnancy. She was on 50 mcg and that was increased to 75 mcg.  She now has a TSH level of <0.01, and a fT4 of 1.3. This has risen from her last level done in October, of 0.8.  She denies any CNS/Cardiac symptoms at present and, in spite of having an infant, is feeling fairly well. She is currently breastfeeding.     Past Medical History     Patient Active Problem List   Diagnosis     Teratoma of ovary     Hypothyroidism     BMI 29.0-29.9,adult     Hidradenitis suppurativa     Cervical high risk HPV (human papillomavirus) test positive     Atypical squamous cells of undetermined significance (ASCUS) on Papanicolaou smear of cervix     Dermoid cyst of both ovaries     Pap smear for cervical cancer screening     Cervical cancer screening       Family History       family history includes Alcoholism in her brother, father, and mother.    Social History      reports that she has quit smoking. She smoked 0.00 packs per day. She has never used smokeless tobacco. She reports previous alcohol use. She reports previous drug use.      Review of Systems     Patient denies fatigue, weight changes, heat/cold intolerance, bowel/skin changes or CVS symptoms.   Remainder per HPI and per attached intake form.      Vital Signs     /70 (Patient Site: Right Arm, Patient Position: Sitting, Cuff Size: Adult Regular) Comment (Cuff  "Size): long  Pulse 70   Ht 5' 9\" (1.753 m)   Wt (!) 224 lb 3.2 oz (101.7 kg)   BMI 33.11 kg/m    Wt Readings from Last 3 Encounters:   02/26/20 (!) 224 lb 3.2 oz (101.7 kg)   02/20/20 222 lb (100.7 kg)   01/16/20 (!) 225 lb (102.1 kg)       Physical Exam     General:  Normal, NIRD,appears euthyroid  Eyes:  Pupils equal, round and reactive to light; no proptosis, lid lag or  periorbital edema.  Thyroid:  Thyroid is normal.  No tenderness or bruit  Neck: No lymph nodes  Musculoskeletal:  Muscle strength grossly normal without evidence of wasting.  Heart:  Regular rate and rhythm without murmur.  Lungs:  Clear to auscultation.  Abdomen: Soft, non-tender, no masses or organomegaly  Neuro: Patella Reflexes were normal.No tremors  Skin:  No acanthosis nigricans or vitiligo          Assessment     1. Other specified hypothyroidism            Plan     Discussed treatment at length today. Methimazole is contraindicated with breastfeeding. PTU is not nearly as effective, but could be used to treat. Conservatively, we can watch and wait, with labs every month and begin treatment as necessary.  Generally, it takes about 6 months following pregnancy, for hormones to level out, and pt is aware of this. She has chosen to get serial labs done monthly and we will consider treatment if necessary. Time spent with pt today: 30 min with >50% spent in counseling and coordination of care.          Susy HERNANDEZ Endocrinology  2/29/2020  9:21 AM      Lab Results     TSH   Date Value Ref Range Status   02/20/2020 <0.01 (L) 0.30 - 5.00 uIU/mL Final     T3, Free   Date Value Ref Range Status   10/10/2019 3.1 1.9 - 3.9 pg/mL Final     No results found for: THYROIDAB    No results found for: [id number]    Imaging Results   Last thyroid ultrasound:  No results found for this or any previous visit.    Last thyroid nuclear scan:  No results found for this or any previous visit.    Current Medications     Outpatient Medications Prior to " Visit   Medication Sig Dispense Refill     acetaminophen (TYLENOL) 325 MG tablet Take 1-2 tablets (325-650 mg total) by mouth every 4 (four) hours as needed.  0     clindamycin (CLEOCIN T) 1 % external solution Apply to affected area twice daily 30 mL 0     ibuprofen (ADVIL,MOTRIN) 800 MG tablet Take 1 tablet (800 mg total) by mouth every 8 (eight) hours. (Patient taking differently: Take 800 mg by mouth every 8 (eight) hours as needed for pain.       )  0     lanolin (LANSINOH HPA) 100 % Oint Apply a thin layer to nipple/areola after feeding.  0     levothyroxine (SYNTHROID) 75 MCG tablet 1 tablet by mouth every morning on an empty stomach 1 hour before eating breakfast. 30 tablet 6     metroNIDAZOLE (METROCREAM) 0.75 % cream        mupirocin (BACTROBAN) 2 % ointment        OMEGA 3-DHA-EPA-VITAMIN D3 ORAL Take 1 capsule by mouth daily.              prenat.vits,ariana,min-iron-folic (PRENATAL VITAMIN) Tab Take 1 tablet by mouth daily.              triamcinolone (KENALOG) 0.1 % ointment        No facility-administered medications prior to visit.

## 2021-06-06 NOTE — PROGRESS NOTES
"[facility]   Knee Initial Evaluation    Patient Name: Divina MORALES See  Date of evaluation: 3/11/2020  Referral Diagnosis: Pain in both knees, unspecified chronicity [M25.561, M25.562]  - Primary   Referring provider: Coretta Richards MD **  Visit Diagnosis:     ICD-10-CM    1. Acute bilateral knee pain  M25.561     M25.562    2. Bilateral thumb pain  M79.644     M79.645    3. Bilateral foot pain  M79.671     M79.672      Precautions: bilateral foot pain (started July 2019 while pregnant), Low back pain started January 2020.     Assessment:   Pt is a 34 y.o. year old female with bilateral knee pain, bilateral thumb pain and bilateral foot pain.  New order was requested for foot and thumb (OT) pain.   Patient has difficulty with weights (body weight), volleyball and walking secondary to knee \"pressure\" and foot pain. Clinical findings include pressure in lateral knee with lunges, squats and stairs.  Pain relief with application of K-tape.  Hammer toe on right 2nd toe resulting in pain in ball of foot.  Patient appears motivated to participate in Physical Therapy and present with a good Physical Therapy prognosis for resolution of activities limitations.        Pt. is appropriate for skilled PT intervention as outlined in the Plan of Care (POC).  Pt. is a good candidate for skilled PT services to improve pain levels and function.    Goals:  Pt. will demonstrate/verbalize independence in self-management of condition in : 12 weeks  Pt. will be independent with home exercise program in : 12 weeks    Pt will: report less plantar foot pain at the end of the day <3/10 in 10 weeks.  Pt will: report less \"pressure\" in knees <3/10 with squats and lunges in 12 weeks.      Patient's goals: Joint pain and wrist/thumb pain/ pp care     Goals and plan of care were set in collaboration with the patient.    Patient's expectations/goals are realistic.    Barriers to Learning or Achieving Goals:  No Barriers. although pt does " "have pain in multiple joints and is post-partum.        Plan / Patient Instructions:      Plan of Care:   Authorization / Certification Start Date: 03/11/20  Authorization / Certification End Date: 06/03/20  Authorization / Certification Number of Visits: 12  Communication with: Referral Source  Patient Related Instruction: Nature of Condition;Treatment plan and rationale;Self Care instruction;Basis of treatment;Body mechanics;Next steps;Expected outcome  Times per Week: 1  Number of Weeks: up to 12  Number of Visits: 12  Discharge Planning: met goals and self management of symptoms  Therapeutic Exercise: ROM;Stretching;Strengthening  Neuromuscular Reeducation: kinesio tape  Manual Therapy: soft tissue mobilization;myofascial release;joint mobilization  Equipment: [email or web address]      Treatment techniques, plan of care, and goals were discussed with the patient. The patient agrees to the plan as outlined. The plan of care is dynamic and will be modified on an ongoing basis.  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 female who presents to therapy today with complaints of bilateral knee pain (assuming it from weight gain). Date of onset/duration of symptoms is 3 weeks after birth of son (6 months ago). Onset was gradual. Symptoms are constant.  Pt does reports some \"fluid\" in her knees before her pregnancy but no pressure.     Foot pain: started during pregnancy.  The plantar aspect of foot bilateral.  Pain has improved overall.  Pain with being on feet a lot, walking, hiking etc.     Pain Rating:achy with sitting   Pain rating at best: " "achy   Pain rating at worst: 6 - after playing Volleyball or lifting (squats and lunges) - body weight lifting with light weights.   Pain description:aching   \"pt reports fluid\" in lower lateral patella  \"cracking\" no pain with   Pressure with squats   Pain after being active - knees stiff and \"pressurey\"     Exercise: weights/yoga 1-2x a week - hoping to increase, volleyball 2x a month will started 1-2x a week this month     Functional limitations are described as occurring with:   Volleyball - stopped playing for 9 months while pregnant     Patient reports benefit from:  nothing        Objective:      Note: Items left blank indicates the item was not performed or not indicated at the time of the evaluation.    Knee Examination  1. Acute bilateral knee pain     2. Bilateral thumb pain     3. Bilateral foot pain       Precautions/Restrictions:  None  Involved Side: Bilateral (right>left foot and knee)   Posture Observation:      General sitting posture is  fair.  Assistive Device: None  Gait Observation: normal     Knee ROM     Date:  3/11/20    AROM in degrees  Right   Left  Right   Left  Right   Left       Knee Flexion  (130 )   136   131 - pain                    Knee Extension  (0 )   -2   -2                 PROM in degrees  Right   Left  Right   Left  Right   Left       Knee Flexion  (130 )                         Knee Extension  (0 )                       LE Strength                  Date:  3/11/20   Strength (MMT/5)  Right   Left  Right   Left  Right   Left       Hip Flexion   5   5                   Hip Abduction   4   4+                   Hip Adduction                         Hip Extension   5   5                   Hip Internal Rotation   5   5                   Hip External Rotation   5   5                   Knee Extension   5   5                   Knee Flexion   5   5                   Ankle Dorsiflexion   5   5                   Ankle Plantarflexion   8 with pain in foot    5 (x16+)                Foot " "inversion/eversion: 5/5     Palpation:  No pain with palpation to knee or tenderness   Foot: pain in ball of right foot related to hammer toe.   Tenderness in plantar aspect of feet     Knee Special Tests (+/-):      Knee OA Cluster   Right   Left   Ligament Tests   Right   Left    1. > 49 y/o   -    -      Lachman          2. Stiffness > 30 min.   -    -      Anterior Drawer   -    -     3. Crepitus   +   +     Posterior Drawer   -    -     4. Bony tenderness   -    -      Posterior Sag          5. Bone enlargement           Valgus Stress         0 /30 degrees    -    -     6. No warmth to the touch           Varus Stress      0/30 degrees    -    -      Meniscal Tests   Right   Left    Other   Right    Left       Danie's   -    -      Ely's             Joint line tenderness   -    -      Britney             Thessaly - standing    -    Pressure      Alessandro            Apley's - prone  -  -     Patellar grind        Bounce home   -  -     Patellar      apprehension    -    -      Hammer toe on right 2nd digit resulting in pain in ball of foot. Donut foam applied to shoe to take pressure off metatarsal     Squat: cues to avoid anterior translation of knee with deep squat   Lunge: increased pressure on knees - recommended pt discontinue     Stairs: 16 stairs x2 without K-tape and with K-tape   K-Tape  Prior to application skin was cleaned with alcohol wipe  2 strips were applied to medial and lateral knee with mild stretch.  No stretch applied to 2\" ends of tape.   Pt was sitting during application.   Decreased \"pressure\" in knees with K-tape.       Treatment Today     TREATMENT MINUTES COMMENTS   Evaluation 32 -knee pain  -educated on POC, diagnosis, relevant anatomy and basis for treatment.    Self-care/ Home management 23 Use of ice 20 min after exercising on lateral knees   Discussed challenges of weight loss as pt is still breast feeding. Discussed nutrition and exercise   Recommended cardio such as stationary " bike or elliptical at [employer]   Discussed up to K-tape    Manual therapy     Neuromuscular Re-education     Therapeutic Activity     Therapeutic Exercises Not today  -see exercise flow sheet   Handouts provided for HEP.    Gait training     Modality__________________                Total 60    Blank areas are intentional and mean the treatment did not include these items.     PT Evaluation Code: (Please list factors)  Patient History/Comorbidities: see above  Examination: LE (knee and foot)   Clinical Presentation: stable   Clinical Decision Making: low    Patient History/  Comorbidities Examination  (body structures and functions, activity limitations, and/or participation restrictions) Clinical Presentation Clinical Decision Making (Complexity)   No documented Comorbidities or personal factors 1-2 Elements Stable and/or uncomplicated Low   1-2 documented comorbidities or personal factor 3 Elements Evolving clinical presentation with changing characteristics Moderate   3-4 documented comorbidities or personal factors 4 or more Unstable and unpredictable High                Angela Mcfadden, PT, DPT  3/11/2020  7:54 AM    [facility] Discharge Summary  Patient Name: Divina MORALES See  Date: 8/31/2020  Referral Diagnosis:  Pain in both knees, unspecified chronicity [M25.561, M25.562]  - Primary   Referring provider: Coretta Richards MD  Visit Diagnosis:   1. Acute bilateral knee pain     2. Bilateral thumb pain  Ambulatory referral to PT/OT   3. Bilateral foot pain  Ambulatory referral to PT/OT       Goals:Not met - see above     Patient was seen for 1 visit for evaluation on 3/11/20.  No follow-up appointments due to COVID.  Pt was emailed on 8/19/20 to see if she would like to continue therapy but she did not call to schedule.     Therapy will be discontinued at this time.  The patient will need a new referral to resume.    Thank you for your referral.  Angela Mcfadden, PT, DPT   8/31/2020  10:00  AM

## 2021-06-06 NOTE — PROGRESS NOTES
Assessment:   1.  IUD check  2.  Hypothyroidism not on medication x15 weeks     Plan:   -Discussed danger signs and symptoms of the IUD including how to check for strings. Instructed patient to check her strings monthly. Discussed when/where to call with any fever, severe back pain, severe abdominal pain, heavy bleeding (soaking more than 1 pad per hour). Also encouraged to call if she does not feel her strings. She was advised to use Ibuprofen as needed for mild to moderate pain.   -Paragard IUD should be removed by 1/16/2020.  -Thyroid cascade today.  It is likely that she will need to restart her levothyroxine at least at the dosage she stopped taking 15 weeks ago: 75 mcg p.o. daily.    TT with patient 25 mns >50% time spent in counseling or coordination of care.     Subjective:       Divina Goyal is a 34 y.o. female who presents for IUD check. She had a Paragard inserted on 1/16/2020. Reports no complaints since insertion. Bleeding has been absent after very light spotting following the insertion. Minimal cramping, well-controlled with Ibuprofen.     Review of Systems  Pertinent items are noted in HPI.      Objective:     Physical Exam:  General: Pleasant, articulate, well-groomed, well-nourished female.  Not in any apparent distress.  Abdomen: Soft, nontender, no masses palpated, negative CVAT.  External genitalia: Normal hair distribution, no lesions.  Urethral opening: Without lesions or discharge. No tenderness.   Bladder: Without masses, or tenderness.  Vagina: Pink, rugated, normal-appearing discharge.  Cervix: parous, pink, smooth, no lesions. IUD strings visualized and 3 cm in length.   Bimanual: small, mobile, nontender, no masses.  Negative CMT.  Adnexa, without masses or tenderness.

## 2021-06-06 NOTE — TELEPHONE ENCOUNTER
Can Teresa see this patient?      Priority: Routine Class: Internal Referral   Standing Status: Normal Status: Sent [2]   Ordering User: Gloria Helton APRN, CNM Department: l Midwifery   Auth Provider: GLORIA HELTON Enc Provider: Gloria Helton APRN, CNM   Diagnosis: Other specified hypothyroidism

## 2021-06-06 NOTE — PROGRESS NOTES
"Assessment / Impression     1. Pain in both knees, unspecified chronicity  Ambulatory referral to PT/OT   2. Bilateral foot pain     3. Other specified hypothyroidism     4. Class 1 obesity           Plan:     No significant abnormal findings on exam today.  No known trauma, at this time will hold off on knee x-rays, and I do recommend physical therapy for further evaluation and treatment.  Given patient's history, foot pain sounds to be less likely plantar fasciitis, however she may have worsening pain due to weight gain during pregnancy which she has had a difficult time losing.  We discussed icing and stretching the plantar aspect of her foot.  Follow-up in 1 to 2 months if symptoms persist, consider imaging at that time.    She will continue to follow with endocrinology with monthly TSH labs for the next few months, discontinue levothyroxine.    In regard to weight loss, at this time she is breast-feeding, would not recommend medication to help with weight management, and we discussed healthy diet to keep up with breast milk supply as well.  Follow-up 1 to 2 months.    Return in about 1 month (around 4/2/2020) for Follow Up.    Subjective:      HPI: Divina Goyal is a 34 y.o. female, new to me, here today with 4 month old son \"Cody\" who presents for establishing care, having some \"postpartum aches\", has had some difficulty losing weight since pregnancy, and hypothyroidism when she was pregnant with abnormal labs.  When she was age 18-19 she was hyperthyroid, was taking a medication (doesn't recall name), was taking it for a year, then had normal labs.  When she was pregnant, she was noted to have hypothyroidism, though now in postpartum state she had TSH checked which was less than 0.01 however free T4 is 1.3.  She saw endocrinology, will plan to do monthly TSH labs for now given she does have normal free T4.      She noted pain in feet while she was pregnant, and also noted knee pain and lower back.  Feet " pain started around July 2019, while she was pregnant, knee pain started about 3 weeks after birth of her son. Low back pain probably January, 2020.  Has tried body weight movement exercises, but then when she does exercise, feels her feet and knees get achy.  Feet pain is more in the balls of her feet, worse after activity.  She does not have that much pain during the morning when she first wakes up.  She does walk barefoot,  mostly hardwood floors.  In April, she had bilateral ovarian cystectomy for teratomas during pregnancy, and had another teratoma grew after delivery and had another surgery.  She is normally an active person, wanting to get back to that level, though wasn't able to be active due to surgery.  She takes advil as needed, which can help symptoms.  No numbness or tingling associated with symptoms.      Prepregnancy weight 188lb.        Medical History:     Patient Active Problem List   Diagnosis     Teratoma of ovary     Hypothyroidism     BMI 29.0-29.9,adult     Hidradenitis suppurativa     Cervical high risk HPV (human papillomavirus) test positive     Atypical squamous cells of undetermined significance (ASCUS) on Papanicolaou smear of cervix     Dermoid cyst of both ovaries     Pap smear for cervical cancer screening     Cervical cancer screening       Past Medical History:   Diagnosis Date     Abnormal Pap smear of cervix      Chickenpox      Headache      HPV (human papilloma virus) infection      Hypothyroidism      Perineal laceration during delivery 11/01/2019    second degree     Preeclampsia     delivered 11/1/19     Seasonal allergies      Teratoma of ovary 04/09/2019    Bilateral, surgically removed during pregnancy     Teratoma of ovary 04/10/2019    bilateral     UTI (urinary tract infection)        Past Surgical History:   Procedure Laterality Date     APPENDECTOMY      as a child     MOUTH SURGERY       FL HYSTEROSCOPY,W/ENDO BX N/A 11/15/2019    Procedure: HYSTEROSCOPY, ULTRASOUND  "GUIDED DILATION AND CURETTAGE;  Surgeon: Alessandro Barber MD;  Location: [facility];  Service: Gynecology     ID LAP,FULGURATE/EXCISE LESIONS N/A 11/15/2019    Procedure: LAPAROSCOPY, REMOVAL OF LEFT OVARIAN CYSTECTOMY, HYSTEROSCOPY , WITH ULTRASOUND;  Surgeon: Alessandro Barber MD;  Location: [facility] [place];  Service: Gynecology     TERATOMA EXCISION Bilateral 04/09/2019    Ovaries       Current Medications:     Current Outpatient Medications   Medication Sig     clindamycin (CLEOCIN T) 1 % external solution Apply to affected area twice daily     ibuprofen (ADVIL,MOTRIN) 800 MG tablet Take 1 tablet (800 mg total) by mouth every 8 (eight) hours. (Patient taking differently: Take 800 mg by mouth every 8 (eight) hours as needed for pain.       )     metroNIDAZOLE (METROCREAM) 0.75 % cream      mupirocin (BACTROBAN) 2 % ointment      OMEGA 3-DHA-EPA-VITAMIN D3 ORAL Take 1 capsule by mouth daily.            prenat.vits,ariana,min-iron-folic (PRENATAL VITAMIN) Tab Take 1 tablet by mouth daily.            triamcinolone (KENALOG) 0.1 % ointment      lanolin (LANSINOH HPA) 100 % Oint Apply a thin layer to nipple/areola after feeding.       Family History:     Family History   Problem Relation Age of Onset     Alcoholism Mother      Alcoholism Father      Alcoholism Brother        Review of Systems  All other systems reviewed and are negative.         Social History:     Social History     Tobacco Use   Smoking Status Former Smoker     Packs/day: 0.00   Smokeless Tobacco Never Used   Tobacco Comment    quit at age 19     Social History     Social History Narrative    . Has 4 month old son \"Cody\".  Works part time at [employer].         Objective:     /64 (Patient Site: Left Arm, Patient Position: Sitting, Cuff Size: Adult Large)   Pulse 64   Resp 17   Ht 5' 9\" (1.753 m)   Wt (!) 223 lb 12.8 oz (101.5 kg)   Breastfeeding Yes   BMI 33.05 kg/m    Physical Examination: General appearance - alert, " well appearing, and in no distress  Eyes: extraocular eye movements intact  Mouth: mucous membranes moist, pharynx normal without lesions  Neck: supple, no significant adenopathy or thyromegaly  Lungs: clear to auscultation, no wheezes, rales or rhonchi, symmetric air entry  Heart: normal rate, regular rhythm, normal S1, S2, no murmurs.  Abdomen: soft, nontender, nondistended, no masses or organomegaly  Neurological: alert, oriented, normal speech, no focal findings or movement disorder noted.    Musculoskeletal: Visual inspection of knees do not show any abnormalities.  Full range of motion with flexion extension.  No effusion noted.  No joint line tenderness to palpation bilaterally.  Ligaments appear intact bilaterally.  Normal gait.  No specific plantar fascial tenderness to palpation noted on exam.  Extremities: No edema, no clubbing or cyanosis  Psychiatric: Normal affect. Does not appear anxious or depressed.    No results found for this or any previous visit (from the past 168 hour(s)).      Coretta Richards MD  3/2/2020  3:22 PM

## 2021-06-07 NOTE — PROGRESS NOTES
"Divina Goyal is a 35 y.o. female who is being evaluated via a billable video visit.      The patient has been notified of following:     \"This video visit will be conducted via a call between you and your physician/provider. We have found that certain health care needs can be provided without the need for an in-person physical exam.  This service lets us provide the care you need with a video conversation.  If a prescription is necessary we can send it directly to your pharmacy.  If lab work is needed we can place an order for that and you can then stop by our lab to have the test done at a later time.    Video visits are billed at different rates depending on your insurance coverage. Please reach out to your insurance provider with any questions.    If during the course of the call the physician/provider feels a video visit is not appropriate, you will not be charged for this service.\"    Patient has given verbal consent to a Video visit? Yes    Patient would like to receive their AVS by AVS Preference: Gigi.    Patient would like the video invitation sent by: Send to e-mail at: [email or web address]    Will anyone else be joining your video visit? No      Video Start Time: 1:32pm    Additional provider notes:   Assessment / Impression     1. Acute bilateral low back pain without sciatica  Ambulatory referral to PT/OT         Plan:     Discussed with patient that given her history, symptoms do appear more musculoskeletal at this time.  She does not have any red flag symptoms.  Discussed options of physical therapy, which she is open to, though would [facility] not [facility].  We did also discuss medication options, such as Lidoderm patch, though patient is somewhat hesitant to use medications because she does not want to mask the pain.  We also discussed possibly using a muscle relaxant, though because she is currently nursing she would prefer not to do so.  We will start with physical " exercise, and I will provide her with some stretches in her after visit summary.  Follow-up in 1 to 2 months if symptoms persist, sooner if they worsen.    Return in about 1 month (around 5/21/2020), or if symptoms worsen or fail to improve.    Subjective:      HPI: Divina Goyal is a 35 y.o. female here today for video visit who presents for low back pain.  Symptoms started approximately 1 month ago, at that time she was having some abdominal and low back pain which felt similar to when experiencing dermoid cyst of her ovaries.  She did speak with OB/GYN, who recommended ultrasound, and per patient the ultrasound did not show any cysts or teratomas on her ovaries, and it was thought that her pain was related to her IUD settling.  Her abdominal pain went away 2 to 3 days later, however her back pain has since persisted, and has progressively gotten worse.  Describes it as mid low back pain without any radiation.  No radiation of pain to her legs.  No numbness, tingling, weakness of her legs.  No bowel or bladder incontinence.  No fevers, chills, sweats, weight changes.  She did try to do some light stretching at home, though it did make it feel slightly worse.  She has tried to limit the types of medication that she is taking, though she has taken ibuprofen at night sporadically which has helped slightly.      Medical History:     Patient Active Problem List   Diagnosis     Teratoma of ovary     Hypothyroidism     BMI 29.0-29.9,adult     Hidradenitis suppurativa     Cervical high risk HPV (human papillomavirus) test positive     Atypical squamous cells of undetermined significance (ASCUS) on Papanicolaou smear of cervix     Dermoid cyst of both ovaries     Pap smear for cervical cancer screening     Cervical cancer screening       Past Medical History:   Diagnosis Date     Abnormal Pap smear of cervix      Chickenpox      Headache      HPV (human papilloma virus) infection      Hypothyroidism      Perineal laceration  during delivery 11/01/2019    second degree     Preeclampsia     delivered 11/1/19     Seasonal allergies      Teratoma of ovary 04/09/2019    Bilateral, surgically removed during pregnancy     Teratoma of ovary 04/10/2019    bilateral     UTI (urinary tract infection)        Past Surgical History:   Procedure Laterality Date     APPENDECTOMY      as a child     MOUTH SURGERY       KS HYSTEROSCOPY,W/ENDO BX N/A 11/15/2019    Procedure: HYSTEROSCOPY, ULTRASOUND GUIDED DILATION AND CURETTAGE;  Surgeon: Alessandro Barber MD;  Location: [facility] [place];  Service: Gynecology     KS LAP,FULGURATE/EXCISE LESIONS N/A 11/15/2019    Procedure: LAPAROSCOPY, REMOVAL OF LEFT OVARIAN CYSTECTOMY, HYSTEROSCOPY , WITH ULTRASOUND;  Surgeon: Alessandro Barber MD;  Location: [facility] [place];  Service: Gynecology     TERATOMA EXCISION Bilateral 04/09/2019    Ovaries       Current Medications:     Current Outpatient Medications   Medication Sig     clindamycin (CLEOCIN T) 1 % external solution Apply to affected area twice daily     ibuprofen (ADVIL,MOTRIN) 800 MG tablet Take 1 tablet (800 mg total) by mouth every 8 (eight) hours. (Patient taking differently: Take 800 mg by mouth every 8 (eight) hours as needed for pain.       )     metroNIDAZOLE (METROCREAM) 0.75 % cream      OMEGA 3-DHA-EPA-VITAMIN D3 ORAL Take 1 capsule by mouth daily.            prenat.vits,ariana,min-iron-folic (PRENATAL VITAMIN) Tab Take 1 tablet by mouth daily.            lanolin (LANSINOH HPA) 100 % Oint Apply a thin layer to nipple/areola after feeding.     mupirocin (BACTROBAN) 2 % ointment      triamcinolone (KENALOG) 0.1 % ointment        Family History:     Family History   Problem Relation Age of Onset     Alcoholism Mother      Alcoholism Father      Alcoholism Brother        Review of Systems  All other systems reviewed and are negative.         Social History:     Social History     Tobacco Use   Smoking Status Former Smoker     Packs/day: 0.00  "  Smokeless Tobacco Never Used   Tobacco Comment    quit at age 19     Social History     Social History Narrative    . Has 4 month old son \"Cody\".  Works part time at [employer].         Objective:       Physical Examination: General appearance - alert, well appearing, and in no distress  General: No acute distress  Eyes: EOMs intact  Respiratory: Non-labored breathing.  Skin: No rashes or lesions noted on the face and hands.  Anicteric.  Neurologic: Alert.  Cranial nerves II through XII grossly intact.  Musculoskeletal: normal gait.  Decreased ROM with flexion of back.  Psych: Normal affect.  Does not appear anxious or depressed.  Normal speech pattern.  Maintains eye contact.              Video-Visit Details    Type of service:  Video Visit    Video End Time (time video stopped): 1:47pm    Originating Location (pt. Location): Home    Distant Location (provider location):  [facility]     Mode of Communication:  Video Conference via [email or web address]      Coretta Richards MD    "

## 2021-06-07 NOTE — TELEPHONE ENCOUNTER
Called to screen patient for upcoming lab-only appointment scheduled for 3/25/2020.     Patient asked to cancel appointment because she did not want to leave her house and her lab tests are not urgent.    Patient stated she will reschedule next month.  Appointment for 3/25 cancelled.    Zoraida Carr, [facility]

## 2021-06-09 NOTE — PROGRESS NOTES
Assessment:   1.  ParaGard IUD removal  2.  Contraceptive management  3.  Preconception counseling  4.  Hydradenitis suppurativa  5.  Boil, left inner thigh with cellulitis  6.  Recent history of low TSH without follow-up  7.  Cervical cancer screening  8.  Lactating mother    Plan:   Warning signs were reviewed with the patient including bleeding, pain and infection. Discussed BCM options and preconception health, encouraged PNV for planned pregnancy.  -Discussed the use of ibuprofen 24 hours prior to menstruation and throughout to decrease menstrual flow and duration as well as dysmenorrhea.  -ParaGard IUD removed without incident per patient request.  -Pap smear obtained.  -Begin prenatal vitamin with folic acid daily.  -Encouraged standing lab orders from endocrinology to be drawn today.  -Cephalexin 500 mg 1 p.o. 3 times daily x7 days, #21, no refills for left inner thigh boil and resulting cellulitis.  If symptoms do not improve or worsen in 48 to 72 hours, call or RTC.  -Warm hydrotherapy/Epsom salt baths or warm compress 3 times daily x20 to 30 min.  -All questions answered.  -Encouraged to call or return to clinic with any questions, concerns, or as needed.    TT with patient 25 mns >50% time spent in counseling or coordination of care.     Subjective:       Divina Goyal is a 35 y.o. female who presents for IUD removal. Reason for IUD removal: Heavier flow with menstrual periods and the desire to conceive within 1 year. She had a Paragard inserted on 1/16/2020. Reports no complaints since insertion. Bleeding has been Heavier than usual. Minimal cramping.   She and her  desire conception by next spring 2021.  Not taking prenatal vitamin at this time.  Low TSH measurement postpartum without follow-up.  Standing orders exist from endocrinology conducted 2/26/2020.  Pap smear is due.  Hidradenitis suppurativa diagnosed previously.  Boil on left inner thigh worsening, painful.  Redness surrounds.   Breast-feeding and mindful of safe medication administration.  Denies fever, chills, streaking.    Review of Systems  Pertinent items are noted in HPI.      Objective:     PROCEDURE:  A speculum was placed and the IUD strings were visualized.  The IUD strings were grasped with a ring forceps and gentle traction was applied.  The ParaGard IUD was easily removed and was noted to be intact.  PAP SMEAR was obtained today.  The speculum was then removed.    Upon pelvic exam, left upper thigh with a 1 cm x 1 cm indurated boil with a centralized scab surrounded by 12 cm x 7 cm of erythema and increased warmth, no streaking.

## 2021-06-09 NOTE — TELEPHONE ENCOUNTER
NICOLAS Cottrell  Divina is calling today because she has a covid test scheduled for tomorrow afternoon. Having symptoms of fatigue, sore throat and body aches. She has an 8 month old son she is breastfeeding. She is wondering what guidance is available regarding breastfeeding and covid 19.    I advised per care guidelines. All questions answered.    Mary Alonzo RN  [facility] Nurse Advisor      Reason for Disposition    COVID-19 Maternal Illness and Breastfeeding    Protocols used: CORONAVIRUS (COVID-19) DIAGNOSED OR [id number] 5.15.20    COVID 19 Nurse Triage Plan/Patient Instructions    Please be aware that novel coronavirus (COVID-19) may be circulating in the community. If you develop symptoms such as fever, cough, or SOB or if you have concerns about the presence of another infection including coronavirus (COVID-19), please contact your health care provider or visit [email or web address].     Disposition/Instructions    Home care recommended. Follow home care protocol based instructions.    Thank you for taking steps to prevent the spread of this virus.  o Limit your contact with others.  o Wear a simple mask to cover your cough.  o Wash your hands well and often.    Resources    [facility]: About COVID-19: [email or web address]    CDC: What to Do If You're Sick: www.cdc.gov/coronavirus/2019-ncov/about/steps-when-sick.html    CDC: Ending Home Isolation: www.cdc.gov/coronavirus/2019-ncov/hcp/disposition-in-home-patients.html     CDC: Caring for Someone: www.cdc.gov/coronavirus/2019-ncov/if-you-are-sick/care-for-someone.html     [facility]: Interim Guidance for Hospital Discharge to Home: www.health.[facility].[place]/diseases/coronavirus/hcp/hospdischarge.pdf    [facility] clinical trials (COVID-19 research studies): clinicalaffairs.[facility]/umn-clinical-trials     Below are the COVID-19 hotlines at the [place] Department of Health ([facility]). Interpreters are available.   o For health questions:  Call 042-537-6675 or 1-997.587.2710 (7 a.m. to 7 p.m.)  o For questions about schools and childcare: Call 361-401-4230 or 1-271.808.1029 (7 a.m. to 7 p.m.)

## 2021-06-10 ENCOUNTER — PRENATAL OFFICE VISIT - HEALTHEAST (OUTPATIENT)
Dept: MIDWIFE SERVICES | Facility: CLINIC | Age: 36
End: 2021-06-10

## 2021-06-10 DIAGNOSIS — O26.03 EXCESSIVE WEIGHT GAIN DURING PREGNANCY IN THIRD TRIMESTER: ICD-10-CM

## 2021-06-10 DIAGNOSIS — O09.529 SUPERVISION OF HIGH-RISK PREGNANCY OF ELDERLY MULTIGRAVIDA: ICD-10-CM

## 2021-06-10 ASSESSMENT — MIFFLIN-ST. JEOR: SCORE: 1938.73

## 2021-06-10 NOTE — TELEPHONE ENCOUNTER
I called the pt, she would like Teresa to manage. Pt will have labs repeated the day before her 8/26 appt with COMFORT Moore.

## 2021-06-10 NOTE — TELEPHONE ENCOUNTER
I called LM for the pt to c/b and discuss if Gloria Luis MCCLENDON will be handling her thyroid medication/ tx/ labs or we will, as we both do not need to.

## 2021-06-10 NOTE — TELEPHONE ENCOUNTER
I called the pt, she is not on any thyroid medication.The pt is breast feeding currently. The pt states that she has beem dizzy lately, no other sx. I informed I will speak to Teresa and contact her back.

## 2021-06-10 NOTE — TELEPHONE ENCOUNTER
Patient had labs done last week and her levels are really off.   Please have Teresa review the results and advise on what to do.  She has an appointment with Teresa, but it's not until late August.     Divina @ [phone]

## 2021-06-10 NOTE — TELEPHONE ENCOUNTER
A: 1.  Acquired hypothyroidism currently not on medication supplementation  2.  Elevated TSH  3.  Low free T4    P: Explained to Divina that those labs were ordered by endocrinology, and the results did not come to this writer as I was not the ordering clinician.  However, considering how high the TSH is with a low free T4, this writer is comfortable prescribing Synthroid 150 mcg p.o. on an empty stomach 1 hour before breakfast, #30, 1 refill knowing that patient has a follow-up appointment with endocrinology on August 26, 2020.  The dosage of Synthroid was calculated per Up To Date recommendation of 1.6 mcg/kg (patient is currently 98 kg).  The above calculation was explained to patient, and follow-up lab work is essential to determine correct dosage.  Labs will be drawn at endocrinology appointment August 26, 2020 and Synthroid dosage can be adjusted accordingly.  All questions answered.  Divina has verbal understanding of and agrees with the plan of care.  Encouraged to call or return to clinic with any questions, concerns, or as needed.    S: Patient called and left a message today that no one has communicated with her about blood work (TSH and free T4) performed on 7/22/2020.  Symptoms of hypothyroidism: Brain fog, fatigue, feeling tired upon awakening, excessive hair falling out, difficulty losing pregnancy weight (30 pounds above prepregnancy weight).    O: Alert and in no apparent distress  7/22/2020: TSH 22.82 (H)  7/22/2020: Free T4 0.6 (L)

## 2021-06-10 NOTE — PROGRESS NOTES
"Divina Goyal is a 35 y.o. female who is being evaluated via a billable video visit.      The patient has been notified of following:     \"This video visit will be conducted via a call between you and your physician/provider. We have found that certain health care needs can be provided without the need for an in-person physical exam.  This service lets us provide the care you need with a video conversation.  If a prescription is necessary we can send it directly to your pharmacy.  If lab work is needed we can place an order for that and you can then stop by our lab to have the test done at a later time.    Video visits are billed at different rates depending on your insurance coverage. Please reach out to your insurance provider with any questions.    If during the course of the call the physician/provider feels a video visit is not appropriate, you will not be charged for this service.\"    Patient has given verbal consent to a Video visit? Yes  How would you like to obtain your AVS? AVS Preference: [employer].  If dropped by the video visit, the video invitation should be sent to: Other e-mail: My Chart  Will anyone else be joining your video visit? No        Video Start Time: 0847    Additional provider notes:       Reason for visit      1. Acquired hypothyroidism        HPI     Divina Goyal is a very pleasant 35 y.o. old female who presents for follow up.  SUMMARY:    Divina is contacted today in f/u for Hypothyroidism. She is now 6 months postpartum. She has been on Levothyroxine 150 mcg daily for a month and her TSH and fT4 have changed significantly. They are now 0.18 (22.82) and 1.5 (0.6).  She reports that she continues to have problems sleeping, however this is not a new phenomena. She has been able to lose some pregnancy weight while on the Levothyroxine in the last month, but notes that this has plateaued. She also notes that initially she felt better after starting the medication, but now she is feeling " symptomatic again.     Past Medical History     Patient Active Problem List   Diagnosis     Teratoma of ovary     Hypothyroidism     BMI 29.0-29.9,adult     Hidradenitis suppurativa     Cervical high risk HPV (human papillomavirus) test positive     Atypical squamous cells of undetermined significance (ASCUS) on Papanicolaou smear of cervix     Dermoid cyst of both ovaries     Pap smear for cervical cancer screening     Cervical cancer screening     Low TSH level     Lactating mother       Family History       family history includes Alcoholism in her brother, father, and mother.    Social History      reports that she has quit smoking. She smoked 0.00 packs per day. She has never used smokeless tobacco. She reports previous alcohol use. She reports previous drug use.      Review of Systems     Patient denies fatigue, weight changes, heat/cold intolerance, bowel/skin changes or CVS symptoms.   Remainder per HPI and per attached intake form.      Vital Signs     There were no vitals taken for this visit.  Wt Readings from Last 3 Encounters:   07/22/20 217 lb (98.4 kg)   03/02/20 (!) 223 lb 12.8 oz (101.5 kg)   02/26/20 (!) 224 lb 3.2 oz (101.7 kg)             Assessment     1. Acquired hypothyroidism            Plan     Instructed her to drop one dose a week to see if we can get her TSH up a bit. Also instructed to get labs done in 4 weeks. We will also check for Antibodies, to see if we are dealing with Hashimoto's Disorder. I will see her back in 3 months.           Lab Results     TSH   Date Value Ref Range Status   08/25/2020 0.18 (L) 0.30 - 5.00 uIU/mL Final     T3, Free   Date Value Ref Range Status   10/10/2019 3.1 1.9 - 3.9 pg/mL Final     No results found for: THYROIDAB    No results found for: D3VAEND    Imaging Results   Last thyroid ultrasound:  No results found for this or any previous visit.    Last thyroid nuclear scan:  No results found for this or any previous visit.    Current Medications      Outpatient Medications Prior to Visit   Medication Sig Dispense Refill     clindamycin (CLEOCIN T) 1 % external solution Apply to affected area twice daily 30 mL 0     levothyroxine (SYNTHROID, LEVOTHROID) 150 MCG tablet Take 1 tablet by mouth on an empty stomach, 60 minutes before breakfast. 30 tablet 1     OMEGA 3-DHA-EPA-VITAMIN D3 ORAL Take 1 capsule by mouth daily.              prenat.vits,ariana,min-iron-folic (PRENATAL VITAMIN) Tab Take 1 tablet by mouth daily.              ibuprofen (ADVIL,MOTRIN) 800 MG tablet Take 1 tablet (800 mg total) by mouth every 8 (eight) hours. (Patient taking differently: Take 800 mg by mouth every 8 (eight) hours as needed for pain.       )  0     lanolin (LANSINOH HPA) 100 % Oint Apply a thin layer to nipple/areola after feeding.  0     metroNIDAZOLE (METROCREAM) 0.75 % cream        mupirocin (BACTROBAN) 2 % ointment        triamcinolone (KENALOG) 0.1 % ointment        No facility-administered medications prior to visit.            Video-Visit Details    Type of service:  Video Visit    Video End Time (time video stopped): 0912  Originating Location (pt. Location): Home    Distant Location (provider location):  [facility]     Platform used for Video Visit: Kami SANCHEZ

## 2021-06-12 NOTE — TELEPHONE ENCOUNTER
Teresa team    Refill: levothyroxine (SYNTHROID, LEVOTHROID) 150 MCG tablet    please send to:  [id number] - [place], [place] - [address] AT [facility] [address]      Pt is out of medication. She also completed labs. She would like to know results. She can be reached at [phone]

## 2021-06-12 NOTE — TELEPHONE ENCOUNTER
Pt called to schedule her IOB with a LMP of 9/17. She would like KAT's opinion if she should wait till 10-12 weeks to schedule with all her past history or does KAT feel she should be seen earlier. Before pt schedules she would like KAT's opinion.

## 2021-06-12 NOTE — TELEPHONE ENCOUNTER
Teresa Team      Pt had an emergency and had to leave the state. She forgot her medication.    Please refill:  - levothyroxine (SYNTHROID, LEVOTHROID) 150 MCG tablet    please send a QTY of 10 days worth. She will probably be out for a week.      please send to:   [facility] #[id number] - [place], [place] - [address]

## 2021-06-12 NOTE — TELEPHONE ENCOUNTER
"Pt's mailbox is full unable to leave VM    Refilled 2 months of levothyroxine.  Per Teresa Carranza's lab review \"That looks perfect!  How are you feeling?\"  T4 and TSH WNL  "

## 2021-06-13 NOTE — TELEPHONE ENCOUNTER
Original order placed under hospital context of [facility].  These orders are not visible to clinic specialty schedulers.  A new order was placed on 11/30/2020.  Patient is scheduled with M on 12/4/2020.  Nothing further required at this time.

## 2021-06-13 NOTE — PROGRESS NOTES
"Divina Goyal is a 35 y.o. female who is being evaluated via a billable video visit.      The patient has been notified of following:     \"This video visit will be conducted via a call between you and your physician/provider. We have found that certain health care needs can be provided without the need for an in-person physical exam.  This service lets us provide the care you need with a video conversation.  If a prescription is necessary we can send it directly to your pharmacy.  If lab work is needed we can place an order for that and you can then stop by our lab to have the test done at a later time.    Video visits are billed at different rates depending on your insurance coverage. Please reach out to your insurance provider with any questions.    If during the course of the call the physician/provider feels a video visit is not appropriate, you will not be charged for this service.\"    Patient has given verbal consent to a Video visit? Yes  How would you like to obtain your AVS? AVS Preference: [email or web address].  If dropped by the video visit, the video invitation should be sent to: Text to cell phone: [phone]  Will anyone else be joining your video visit? No        Video Start Time: 1138    Additional provider notes:       Reason for visit      1. Acquired hypothyroidism    2. Pregnancy, incidental        HPI     Divina Goyal is a very pleasant 35 y.o. old female who presents for follow up.  SUMMARY:    Divina is contacted today in f/u for Hypothyroidism. She reports today that she is 10 weeks pregnant. This is not her first pregnancy with Hypothyroidism. She is due on 6/30/2021.  Her current TSH is 2.81 and fT4 is 1.0. She is currently taking 150 mcg 6 days a week and skips the 7th. She reports that she feels well on this dose. It is recommended that women that have Hypothyroidism keep their TSH at or below 2.5 during the pregnancy. She is currently almost there. She reports no problems with her neck.     Past " Medical History     Patient Active Problem List   Diagnosis     Teratoma of ovary     Hypothyroidism     BMI 29.0-29.9,adult     Hidradenitis suppurativa     Cervical high risk HPV (human papillomavirus) test positive     Atypical squamous cells of undetermined significance (ASCUS) on Papanicolaou smear of cervix     Dermoid cyst of both ovaries     Pap smear for cervical cancer screening     Cervical cancer screening     Low TSH level     Lactating mother     History of pre-eclampsia     Multigravida of advanced maternal age in first trimester       Family History       family history includes Alcoholism in her brother, father, and mother.    Social History      reports that she has quit smoking. She smoked 0.00 packs per day. She has never used smokeless tobacco. She reports previous alcohol use. She reports that she does not use drugs.      Review of Systems     Patient denies fatigue, weight changes, heat/cold intolerance, bowel/skin changes or CVS symptoms.   Remainder per HPI and per attached intake form.      Vital Signs     LMP 09/17/2020   Wt Readings from Last 3 Encounters:   11/12/20 214 lb (97.1 kg)   07/22/20 217 lb (98.4 kg)   03/02/20 (!) 223 lb 12.8 oz (101.5 kg)             Assessment     1. Acquired hypothyroidism    2. Pregnancy, incidental            Plan     We will get labs done monthly and make adjustments to her Levothyroxine dose as needed during her pregnancy. She will f/u up with me 6 months postpartum. Medication refilled today.             Lab Results     TSH   Date Value Ref Range Status   11/17/2020 2.81 0.30 - 5.00 uIU/mL Final     T3, Free   Date Value Ref Range Status   10/10/2019 3.1 1.9 - 3.9 pg/mL Final     No results found for: THYROIDAB    No results found for: F8EDLIV    Imaging Results   Last thyroid ultrasound:  No results found for this or any previous visit.    Last thyroid nuclear scan:  No results found for this or any previous visit.    Current Medications      Outpatient Medications Prior to Visit   Medication Sig Dispense Refill     OMEGA 3-DHA-EPA-VITAMIN D3 ORAL Take 1 capsule by mouth daily.              prenat.vits,ariana,min-iron-folic (PRENATAL VITAMIN) Tab Take 1 tablet by mouth daily.              levothyroxine (SYNTHROID, LEVOTHROID) 150 MCG tablet Take 1 tablet by mouth on an empty stomach, 60 minutes before breakfast. (Patient taking differently: Take 1 tablet by mouth on an empty stomach, 60 minutes before breakfast. Take 6 days a week and skip Friday.) 30 tablet 0     clindamycin (CLEOCIN T) 1 % external solution Apply to affected area twice daily 30 mL 0     No facility-administered medications prior to visit.          Video-Visit Details    Type of service:  Video Visit    Video End Time (time video stopped): 1158  Originating Location (pt. Location): Home    Distant Location (provider location):  [facility]     Platform used for Video Visit: Kami SANCHEZ

## 2021-06-13 NOTE — TELEPHONE ENCOUNTER
Date: 11/17/2020 Status: [facility]   Time: 2:20 PM Length: 10   Visit Type: LAB [[id number]] Copay: $0.00   Provider: [facility]

## 2021-06-13 NOTE — TELEPHONE ENCOUNTER
MFM called and they do not see a referral that patient states was sent over to them last week. I looked and it is in there but it looks like it is pending. Please fax to [phone]

## 2021-06-13 NOTE — PROGRESS NOTES
[facility]  Genetic Counseling Consult    Patient: Divina Goyal YOB: 1985   Date of Service: 2020      Divina Goyal was seen at [facility] [place] for genetic consultation to discuss the options for screening and testing for fetal chromosome abnormalities.  The indication for genetic counseling is advanced maternal age.        Impression/Plan:   1.  Divina had a blood draw for NIPT (Innatal test through [employer]).  Results are expected within 7-10 days, and will be available in [employer].  We will contact her to discuss the results, and a copy will be forwarded to the office of the referring OB provider. Divina requested a detailed message with results on her voicemail ([phone]). She does want fetal sex information on her voicemail.     2.  Maternal serum AFP (single marker screen) is recommended after 15 weeks to screen for open neural tube defects. A quad screen should not be performed.    3.  An 18-20 week comprehensive ultrasound is scheduled on 21    Pregnancy History:   /Parity:    Age at Delivery: 36 y.o.  NORMA: 2021, by Last Menstrual Period  Gestational Age: 11w1d    No significant complications or exposures were reported in the current pregnancy.    Pregnancy history:  o 2019: term, , male. Pregnancy complicated by ovarian dermoid cyst removal at 10 weeks and preeclampsia at delivery.       Family History:   A three-generation pedigree was obtained, and is scanned under the  Media  tab.   The following significant findings were reported by Divina:    Divina's , Derik, has Lezama syndrome and undergoes routine cancer screening. We reviewed the 50% risk for each of their children to inherit the pathogenic variant the caused Lezama syndrome. Divina was encouraged to share this information with her children's pediatrician.    Otherwise, the reported family history is  negative for multiple miscarriages, stillbirths, birth defects, mental retardation, known genetic conditions, and consanguinity.       Carrier Screening:   The patient reports that she and the father of the pregnancy have  ancestry:     Cystic fibrosis is an autosomal recessive genetic condition that occurs with increased frequency in individuals of  ancestry and carrier screening for this condition is available.  In addition,  screening in the [facility] includes cystic fibrosis. Expanded carrier screening for mutations in a large panel of genes associated with autosomal recessive conditions including cystic fibrosis, spinal muscular atrophy, and others, is now available.The patient has declined the carrier screening options reviewed today.       Risk Assessment for Chromosome Conditions:   We explained that the risk for fetal chromosome abnormalities increases with maternal age. We discussed specific features of common chromosome abnormalities, including Down syndrome, trisomy 13, trisomy 18, and sex chromosome trisomies.      At age 36 at delivery, the midtrimester risk to have a baby with Down syndrome is 1 in 274.     At age 36 at delivery, the midtrimester risk to have a baby with any chromosome abnormality is 1 in 135.        Testing Options:   We discussed the following options:  Non-invasive Prenatal Testing (NIPT)    Maternal plasma cell-free DNA testing; first trimester ultrasound with nuchal translucency and nasal bone assessment is recommended, when appropriate    Screens for fetal trisomy 21, trisomy 13, trisomy 18, and sex chromosome aneuploidy    Cannot screen for open neural tube defects; maternal serum AFP after 15 weeks is recommended    Chorionic villus sampling (CVS)    Invasive procedure typically performed in the first trimester by which placental villi are obtained for the purpose of chromosome analysis and/or other prenatal genetic analysis    Diagnostic  results; >99% sensitivity for fetal chromosome abnormalities    Cannot test for open neural tube defects; maternal serum AFP after 15 weeks is recommended    Genetic Amniocentesis    Invasive procedure typically performed in the second trimester by which amniotic fluid is obtained for the purpose of chromosome analysis and/or other prenatal genetic analysis    Diagnostic results; >99% sensitivity for fetal chromosome abnormalities    AFAFP measurement tests for open neural tube defects    Comprehensive (Level II) ultrasound: Detailed ultrasound performed between 18-22 weeks gestation to screen for major birth defects and markers for aneuploidy.      We reviewed the benefits and limitations of this testing.  Screening tests provide a risk assessment specific to the pregnancy for certain fetal chromosome abnormalities, but cannot definitively diagnose or exclude a fetal chromosome abnormality.  Follow-up genetic counseling and consideration of diagnostic testing is recommended with any abnormal screening result. Diagnostic tests carry inherent risks- including risk of miscarriage- that require careful consideration.  These tests can detect fetal chromosome abnormalities with greater than 99% certainty.  Results can be compromised by maternal cell contamination or mosaicism, and are limited by the resolution of cytogenetic G-banding technology.  There is no screening nor diagnostic test that can detect all forms of birth defects or mental disability.     It was a pleasure to be involved with [facility] care. Face-to-face time of the meeting was 35 minutes.    Darline Bird MS, [facility]  Maternal Fetal Medicine  [facility]  Phone:[phone]  Phone: [phone]  Email: [email or web address]

## 2021-06-13 NOTE — TELEPHONE ENCOUNTER
Patient called.   She is almost out of her Levothyroxine and will need a refill asap.   She is also 7 weeks pregnant and is concern about her levels.     Please refill of Levothyroxine.     Sandy Murcia @

## 2021-06-13 NOTE — PROGRESS NOTES
Pregnancy Confirmation Visit:     Assessment:     1.  35-year-old  2 para 1-0-0-1 with presumed IUP at 8 weeks 0 days with certain LMP 2020, NORMA pregnancy confirmation.   2. Advanced Maternal Age, will be 36 years old at time of delivery  3. Vaginal Spotting in Early Pregnancy  3. Hypothyroidism   4. History of ovarian cystectomy   5. History of preeclampsia   6.  Prepregnancy BMI 31    Plan:   -Discussed maternity care options at [facility]- philosophy, care models, and locations.   -Physical exam deferred until IOB.   -Anticipatory guidance given re: first trimester discomforts and relief measures. Nutrition principles in early pregnancy briefly discussed. First trimester warning signs reviewed.  -Counseling provided regarding causes of bleeding during the first trimester. Encouraged to seek care if cramping, bright red vaginal bleeding or the passage of clots.   -Dating ultrasound discussed. Accepts referral to [facility] to confirm pregnancy dating and viability.    -Counseled regarding methods to prevent preeclampsia including healthy lifestyle and daily baby aspirin stating at 12-13 weeks of gestation.   -Genetic screening options briefly discussed and offered NIPT due to AMA. Referred to [facility] for genetic counseling and screening.   -Return to endocrinologist for management of hypothyroidism during pregnancy.  -Referral placed to Gynecologist ([facility] Dr. Hernandez) for monitoring of ovarian cysts.  -Pt encouraged to follow-up for IOB visit between 10 and 12 weeks.     TT spent with patient, 30 min, all of which was spent in counseling or coordination of care      Subjective:   Divina MORALES See is a35 y.o., here for pregnancy confirmation visit. This is an planned pregnancy. She is very happy to be pregnant. UPT was positive at home. LMP 20, sure. Symptoms of pregnancy include: mood changes and bilateral arm numbness, which Divina relates to previous carpel tunnel of pregnancy.   "Questions today regarding montoring of ovarian cyst growth with hormones of pregnancy, changes to pregnancy care with history of preeclampsia, and management of hypothyroidism. Brings concerns of 3-4 days of brown-colored spotting and cramping that have now resolved, as well as fewer first trimester symptoms of pregnancy including absence of nausea, emesis, and sensitivity to smell.      Review of Systems  Respiratory: negative  Cardiovascular: negative for edema in lower extremities or face  Gastrointestinal: negative for nausea and vomiting  Genitourinary:negative for bright red bleeding, clots, ongoing cramping  Integument/breast: negative  Neurological: negative for dizziness, headaches and vision changes  Endocrine: positive for mood changes \"moodiness\"      Objective:     /80   Pulse 76   Ht 5' 9\" (1.753 m)   Wt 214 lb (97.1 kg)   LMP 09/17/2020   Breastfeeding No   BMI 31.60 kg/m      Physical Exam:  General: Pleasant, articulate, well-groomed, well-nourished female.  Not in any apparent distress.  Lungs: No shortness of breath. Unlabored breathing.      Patient was seen with student, Kiya PULIDO who was present for learning.  I personally examined and made clinical issues reflected in the document patient.      DANELLE Sanabria, REYES            "

## 2021-06-13 NOTE — TELEPHONE ENCOUNTER
Patient called to see if NIPT results were available. I called [facility] and testing is still in process and results are expected early next week.Information communicated to patient.    Darline Bird MS, [facility]  Maternal Fetal Medicine

## 2021-06-13 NOTE — TELEPHONE ENCOUNTER
Called and discussed normal NIPT results with Divina. Results indicate a low risk for aneuploidy involving chromosomes 21, 18, 13, or sex chromosomes (XY). Fetal sex (male) was disclosed. This puts her current pregnancy at low risk for Down syndrome, trisomy 18, trisomy 13 and sex chromosome abnormalities. Although these results are reassuring, this does not replace a standard chromosome analysis from a chorionic villus sampling or amniocentesis.     Plan: Level II ultrasound is scheduled on 1/21/21. MSAFP is the appropriate second trimester screening test for open neural tube defects; the maternal quad screen is not recommended. Her results are available in her Epic chart for her primary OB to review.    Darline Bird MS, [facility]  Maternal Fetal Medicine  [phone]

## 2021-06-13 NOTE — TELEPHONE ENCOUNTER
BENJAMÍNZ put in referral on 11/20 for NIPT test at [facility]. Pt called to schedule and they said the order is still pending and they can't schedule. Please correct the order and get it to [facility].

## 2021-06-13 NOTE — PROGRESS NOTES
PRENATAL VISIT   FIRST OBSTETRICAL EXAM - OB   Assessment / Impression   1.  35-year-old  2 para 1-0-0-1 with IUP at 12 weeks 1 days with certain LMP 2020, NORMA six 32,021 x 7-week 1 day ultrasound    2. Advanced Maternal Age, will be 36 years old at time of delivery  3. Vaginal Spotting in Early Pregnancy, RESOLVED  4. Hypothyroidism on medication therapy managed by endocrinology  5. History of ovarian cystectomy   6. History of preeclampsia   7.  Prepregnancy BMI 31  8.  Genetic screening: Innatal completed with MFM    Plan:   -Endocrinology to follow thyroid function and levothyroxine dose adjustments as needed.  -Ultrasound today for viability as FHTs not heard with Doppler in clinic.  -IOB labs drawn (including standing lab orders from endocrinology) and hemoglobin A1c.   -Pt is not at risk for and therefore not interested in drawing lead level.   -Reviewed prenatal care schedule.   -Optimal nutrition and weight gain discussed. Pregnancy weight gain of 11-20 lbs (BMI 30.0 or 34.9) encouraged.   -Anticipatory guidance for common pregnancy questions and concerns reviewed.   -Danger s/sx for this trimester reviewed with patient.   -Reviewed genetic screening options with patient, patient does elect for first trimester screening which she already underwent after genetic counseling with MFM. The patient does not elect for quad screening.  Follow-up with MFM for single marker AFP or we can obtain it when RTC for next prenatal visit.  -Reviewed carrier testing options with patient, patient does not elect for testing or referral to genetic counseling.  -IOB packet given and reviewed with patient.   -CNM services and hospital options reviewed; emergency and scheduling phone numbers given to patient.     The patient has the following high risk factors for preeclampsia: History of preeclampsia. Therefore, low-dose aspirin will be initiated today.    The patient has the following moderate risk factors for  preeclampsia:Maternal age 35+, BMI > 30.  Because the patient .does not have two or more risk factors, low-dose aspirin will not be initiated   -Antepartum VTE risk factors absent.  -Patient is at increased risk for overt diabetes, so A1C will be added to IOB labs today.  -Pt is nota candidate for an antepartum OB consult.    -Return to clinic in 4 weeks or sooner as needed.  -Dr. Hernandez recommends ultrasound of ovaries in both second and third trimesters at approximately 14 and 30 weeks of pregnancy.  A 14-week ultrasound has been scheduled for January 11, 2021.    Total time spent with patient: 40 minutes, >50% time spent counseling and coordinating care.   Subjective:   Divina Goyal is a 35 y.o. G 2 P 1001 here today for her first obstetrical exam at 12w3d. Here by herself. This pregnancy is planned. The patient reports nausea, but no vomiting, fatigue and some mild breast tenderness.  Patient's last menstrual period was 09/17/2020., predicting an expected date of delivery of: 6/30/21. Last period was normal. Her previous three cycles were regular. Her pregnancy is dated by 7-week 1 day ultrasound performed on 11/12/2020.   The patient states that she is in a monogamous relationship and states that she is safe. Offered GC/CT screening today and patient declines.  Current symptoms also include: breast tenderness, fatigue, frequent urination and nausea.   Risk factors: pre-pregnancy obesity. Pregnancy Risk Factors: Age is <18 or >35 and Hypertension or pre-eclampsia   VTE antepartum risk factors (two or more risk factors, or 1 * risk factor, places patient at higher risk): none.   Clinical history/risk factors requiring antepartum OB consult: none.   The patient has the following risk factors for overt diabetes: Body mass index greater than or equal to 25 kg/m2. Plus the additional risk factor(s) of: No additional risk factors.  Social History:   Education level: College graduate  Occupation: [employer]  Partners  name: Derik  ?   OB History    Para Term  AB Living   2 1 1     1   SAB TAB Ectopic Multiple Live Births         0 1      # Outcome Date GA Lbr Loyd/2nd Weight Sex Delivery Anes PTL Lv   2 Current            1 Term 19 39w4d / 01:25 7 lb 2 oz (3.232 kg) M Vag-Spont Inhalation, EPI, IV N LUIS ANGEL      History:   Past Medical History:   Diagnosis Date     Abnormal Pap smear of cervix      Chickenpox      Headache      HPV (human papilloma virus) infection      Hypothyroidism      Perineal laceration during delivery 2019    second degree     Preeclampsia     delivered 20     Seasonal allergies      Teratoma of ovary 2019    Bilateral, surgically removed during pregnancy     Teratoma of ovary 04/10/2019    bilateral     UTI (urinary tract infection)       Past Surgical History:   Procedure Laterality Date     APPENDECTOMY      as a child     MOUTH SURGERY       OR HYSTEROSCOPY,W/ENDO BX N/A 11/15/2019    Procedure: HYSTEROSCOPY, ULTRASOUND GUIDED DILATION AND CURETTAGE;  Surgeon: Alessandro Barber MD;  Location: [facility];  Service: Gynecology     OR LAP,FULGURATE/EXCISE LESIONS N/A 11/15/2019    Procedure: LAPAROSCOPY, REMOVAL OF LEFT OVARIAN CYSTECTOMY, HYSTEROSCOPY , WITH ULTRASOUND;  Surgeon: Alessandro Barber MD;  Location: [facility] OR;  Service: Gynecology     TERATOMA EXCISION Bilateral 2019    Ovaries      Family History   Problem Relation Age of Onset     Alcoholism Mother      Alcoholism Father      Alcoholism Brother       Social History     Tobacco Use     Smoking status: Former Smoker     Packs/day: 0.00     Smokeless tobacco: Never Used     Tobacco comment: quit at age 19   Substance Use Topics     Alcohol use: Not Currently     Drug use: Never     Comment: Denies      Current Outpatient Medications   Medication Sig Dispense Refill     levothyroxine (SYNTHROID, LEVOTHROID) 150 MCG tablet Take 1 tablet by mouth on an empty stomach, 60 minutes before  "breakfast. Take 6 days a week and skip Friday. 90 tablet 3     OMEGA 3-DHA-EPA-VITAMIN D3 ORAL Take 1 capsule by mouth daily.              prenat.vits,ariana,min-iron-folic (PRENATAL VITAMIN) Tab Take 1 tablet by mouth daily.              clindamycin (CLEOCIN T) 1 % external solution Apply to affected area twice daily 30 mL 0     No current facility-administered medications for this visit.       No Known Allergies   The patient's medical, surgical and family histories were reviewed and were pertinent to this visit.   Pap smear: Last Pap: 7/22/2020, Result: Normal/negative HPV.   EPDS score today: 3/30 .\"  0\" to #10   History of anxiety or depression: no    Review of Systems   General: Fatigue but otherwise denies problem   Eyes: Denies problem   Ears/Nose/Throat: Denies problem   Cardiovascular: Denies problem   Respiratory: Denies problem   Gastrointestinal: Nausea without vomiting, otherwise negative   Genitourinary: Denies any discharge, vaginal bleeding or itchiness or any other problem   Musculoskeletal: Breast tenderness otherwise denies problem   Skin: Denies problem   Neurologic: Denies problem   Psychiatric: Denies problem   Endocrine: Denies problem   Heme/Lymphatic: Denies problem   Allergic/Immunologic: Denies problem       Objective:   Objective    Vitals:    12/17/20 0903   BP: 110/64   Pulse: 72   Weight: 215 lb (97.5 kg)   Height: 5' 9\" (1.753 m)      Physical Exam:   General Appearance: Alert, cooperative, no distress, appears stated age   HEENT: Normocephalic, without obvious abnormality, atraumatic. Conjunctiva/corneas clear, does not wear corrective lenses   Neck: Symmetrical, trachea midline.   Thyroid: symmetric   Back: Symmetric, no curvature, ROM normal  Lungs: Clear to auscultation bilaterally, respirations unlabored   Heart: Regular rate and rhythm, S1 and S2 normal, no murmur. No edema to lower extremities.   Abdomen: Gravid, soft, non-tender, no masses.   FHT: Not heard with " Doppler  Musculoskeletal: Extremities normal, atraumatic, no cyanosis   Skin: Skin color, texture, turgor normal, no rashes or lesions   Neurologic: Alert and oriented x 3. Normal speech

## 2021-06-14 ENCOUNTER — COMMUNICATION - HEALTHEAST (OUTPATIENT)
Dept: ADMINISTRATIVE | Facility: CLINIC | Age: 36
End: 2021-06-14

## 2021-06-14 NOTE — TELEPHONE ENCOUNTER
Pt calling to inform that for the last few weeks she has had increased brain fog and fatigue. The pt is on levothyroxine 150 mcg. She was taking this dose 6 days per week, however on 1/18/21 the dose was increased to 7 days per week due to a TSH of 3.11. The pt is 17 wks pregnant. The pt denies any other sx. I informed I will discuss with COMFORT Hoyt and contact her back.

## 2021-06-14 NOTE — TELEPHONE ENCOUNTER
I called the pt and informed that COMFORT Moore does not feel any additional labs will provide us with any additional/helpful information. The pt understands.

## 2021-06-14 NOTE — TELEPHONE ENCOUNTER
Please have her pick a day a week and have her take two tablets that day, and one a day the other 6 and see if this helps.  The fatigue could be the pregnancy, but certainly we associate brain fog with higher TSH levels.

## 2021-06-14 NOTE — TELEPHONE ENCOUNTER
"I called the pt and informed of the below. The pt would prefer to have a \"more extensive\" lab work up and is hesitant to increase her medication. I did inform the pt that during pregnancy we would like the TSH between 1-2 and hers currently is 3.11. Please advise on labs? T3? Antibodies?  "

## 2021-06-14 NOTE — PROGRESS NOTES
Next visit: Ask regarding single marker AFP.  Divina presents alone.  Experiencing a migraine headache, not the worst of her life.  She has attempted acetaminophen 500 mg p.o. with some relief.  Believes it is related to recent insomnia and under hydration.  Reviewed comfort measures including: Increasing p.o. hydration; acetaminophen 500 mg 2 p.o. every 6 hours as needed; caffeine; Benadryl 25 mg 1-2 p.o. at bedtime; neck and head massage; magnesium 200 mg daily at bedtime; consider ER evaluation and/or Fioricet if persistent.  Initial OB lab results WNL and reviewed.  FHTs not auscultated with Doppler in the clinic today; ultrasound scheduled today for viability.  Referred to [facility] for 20-week level II Fetal anatomy survey ultrasound and fetal echocardiogram in addition to genetic screening due to AMA.  Followed by endocrinology in regard to lab work and medication dosage adjustments for hypothyroidism.  Second trimester teaching completed and danger signs and symptoms reviewed.  All questions answered.  Encouraged to call or RTC with any questions, concerns, or as needed.

## 2021-06-14 NOTE — PROGRESS NOTES
"Please see \"Imaging\" tab under \"Chart Review\" for details of today's US.    Jeannette Odom, DO    "

## 2021-06-14 NOTE — PROGRESS NOTES
A: Dysuria  Hematuria, microscopic   at 18 0/7 weeks  Low lying placenta    P: Macrobid two times a day x 7 days  UC pending (if culture is negative, may discontinue antibiotics at that time)    S: Divina is here today with reports of burning with urination that started yesterday and continued during the night.  Also reports urgency and feeling like she cannot empty her bladder.  She denies any vaginal irritation, burning, itching, abnormal discharge or odor.  Divina is not able to see any blood when she wipes.  She denies any recent intercourse and is on pelvic rest for low-lying placenta.  She also noted increased frequency during the night.  Denies fever.  She denies any cramping or pelvic pressure.  She will have a level 2 ultrasound next week.  She has not felt fetal movement today, but that is not unusual for her.  She has felt that periodically in the last few weeks.    O: UA: Positive for moderate blood and small leukocytes  Abdomen: soft, non tender, gravid  FH 18

## 2021-06-15 NOTE — PROGRESS NOTES
"Divina presents to the clinic today by herself. Feeling better in regard to energy but has \"good days and bad days.\" During preliminary work-up for fatigue, ferritin level was discovered to be very low. She began chlorophyll supplementation 2 weeks ago and has been feeling progressively better. She was happy to know there could be a reason for feeling so low on energy. Feeling the baby move every day, and she denies lower abdominal pain, loss of fluid or vaginal bleeding. Next visit: 1 hour GCT, hemoglobin and RPR. Plan to offer Tdap--Divina understands  benefits. Written information given regarding  options, GDM screening, vaccines and pregnancy, Tdap, and syphilis.  labor precautions and danger signs and symptoms reviewed. All questions answered. Encouraged daily fetal movement counting and to call or return to clinic with any questions, concerns, or as needed.  "

## 2021-06-16 PROBLEM — Z87.59 HISTORY OF PRE-ECLAMPSIA: Status: ACTIVE | Noted: 2020-11-12

## 2021-06-16 PROBLEM — Z12.4 PAP SMEAR FOR CERVICAL CANCER SCREENING: Status: ACTIVE | Noted: 2019-12-30

## 2021-06-16 PROBLEM — R79.0 LOW FERRITIN: Status: ACTIVE | Noted: 2021-04-15

## 2021-06-16 PROBLEM — R87.610 ATYPICAL SQUAMOUS CELLS OF UNDETERMINED SIGNIFICANCE (ASCUS) ON PAPANICOLAOU SMEAR OF CERVIX: Status: ACTIVE | Noted: 2018-03-16

## 2021-06-16 PROBLEM — D27.1 DERMOID CYST OF BOTH OVARIES: Status: ACTIVE | Noted: 2019-12-06

## 2021-06-16 PROBLEM — R82.71 GBS BACTERIURIA: Status: ACTIVE | Noted: 2021-02-09

## 2021-06-16 PROBLEM — D27.9 TERATOMA OF OVARY: Status: ACTIVE | Noted: 2019-04-09

## 2021-06-16 PROBLEM — O26.813 PREGNANCY RELATED FATIGUE IN THIRD TRIMESTER: Status: ACTIVE | Noted: 2021-02-09

## 2021-06-16 PROBLEM — Z13.79 GENETIC SCREENING: Status: ACTIVE | Noted: 2020-12-19

## 2021-06-16 PROBLEM — O26.03 EXCESSIVE WEIGHT GAIN DURING PREGNANCY IN THIRD TRIMESTER: Status: ACTIVE | Noted: 2021-04-15

## 2021-06-16 PROBLEM — L73.2 HIDRADENITIS SUPPURATIVA: Status: ACTIVE | Noted: 2019-10-10

## 2021-06-16 PROBLEM — O09.523 MULTIGRAVIDA OF ADVANCED MATERNAL AGE IN THIRD TRIMESTER: Status: ACTIVE | Noted: 2020-11-30

## 2021-06-16 PROBLEM — O09.529 SUPERVISION OF HIGH-RISK PREGNANCY OF ELDERLY MULTIGRAVIDA: Status: ACTIVE | Noted: 2020-12-17

## 2021-06-16 PROBLEM — O44.40 LOW-LYING PLACENTA: Status: ACTIVE | Noted: 2021-01-18

## 2021-06-16 PROBLEM — D27.0 DERMOID CYST OF BOTH OVARIES: Status: ACTIVE | Noted: 2019-12-06

## 2021-06-16 PROBLEM — R87.810 CERVICAL HIGH RISK HPV (HUMAN PAPILLOMAVIRUS) TEST POSITIVE: Status: ACTIVE | Noted: 2018-03-16

## 2021-06-16 NOTE — TELEPHONE ENCOUNTER
"PHONE NOTE: Pt calls CNM on call with c/o feeling \"off\" so she went to [facility] and put her arm in the blood pressure cuff at the pharmacy.  Her blood pressures were slightly elevated at 133/77 and 134/74.  But this is well above her norm so she decided to call and check in with the midwives.  She states that she had a history of preeclampsia during her first labor.  She has not had high blood pressure during this pregnancy yet.  She is on baby aspirin due to her history of preeclampsia in a previous pregnancy.  She states that she has anxiety that can get overwhelming sometimes.  She has not had any meds and she does not do therapy for this.  She has had nausea since 12 weeks and this has not changed.  She has no vomiting, no visual changes, no swelling, no epigastric pain.  Patient experienced a heart palpitation and a bit of dizziness/lightheadedness at the beginning of this episode when she felt \"off\" today.  When asked about headaches, she says that she has a dull tension headache behind her eyes which has been present for about an hour.  But this happens quite frequently for her and not new.  I recommended that the patient come into  for evaluation and serial blood pressures.  Report was given to the oncoming midwife Maribel Esquivel.  She will evaluate the patient and decide if she needs any labs.    "

## 2021-06-16 NOTE — PROGRESS NOTES
"Divina presents alone.  Labs today: 1 hour GCT, hemoglobin, RPR, TSH, free T4 and ferritin level.  Ferritin was low (8) on 2021, and Divina has been taking chlorophyll drops since.  Recently received first COVID-19 vaccination (Pfizer).  Plan to defer pertussis immunization today.  Total weight gain thus far is excessive.  Baby is active, and she denies regular uterine contractions, loss of fluid or vaginal bleeding.  Divina is here to meet her baby and for the pregnancy to be completed.  \"I really like welcoming the baby, but the pregnancy has been very difficult.\"  She has a follow-up appointment with [facility] for an ultrasound to follow-up imaging of ovaries.   labor precautions and danger signs and symptoms reviewed.  All questions answered.  Written information given regarding  birth, kick count chart, making plans breast-feeding.  Encouraged daily fetal movement counting and to call or return to clinic with any questions, concerns, or as needed.  "

## 2021-06-16 NOTE — PROGRESS NOTES
"Divina here for evaluation after feeling \"off\" today and doing a self check BP at [facility] that she says was in the 130s.     Elevated BPs on self-check    VS: BP:Data Unavailable         P:   T:   Headache: Present  Visual changes: Absent  Epigastric pain: Absent  Reflexes: +2  Clonus: Absent      OB Arrival Protocol entered. UA and [place] applied for NST.  Labs collected: HEL Panel    Maribel Esquivel CNM here to see patient.    Meghan Flynn RN  4/6/2021 6:42 PM        Meghan Flynn RN  4/6/2021 6:38 PM      "

## 2021-06-16 NOTE — PROGRESS NOTES
Divina's labs all returned WNL. Evaluated by aMribel Esquivel CNM and peterson for discharge to home. Instructions given and questions answered. Ambulatory to car, picked up by .

## 2021-06-16 NOTE — PROGRESS NOTES
"Outpatient Note:    Patient Name:  Divina Goyal  :      1985  MRN:      [id number]    Assessment:   @ 27w6d here for evaluation of mildly elevated BP by self-check  Mild headache  BP and HELLP labs WNL on [facility] [place]  Hx of preeclampsia in last pregnancy - taking ASA 81mg daily  Hypothyroidism- on Synthroid  BMI 31 with excessive pregnancy weight gain    Plan:   - Reviewed normal blood pressure and normal HELLP labs.   - Discharge to home undelivered. Reviewed warning signs including decreased fetal movement, leaking of fluid, vaginal bleeding, or signs of labor. Reviewed symptoms of preeclampsia and encouraged to call if she experiences any of these.  - Reviewed how to contact on-call CNM. Follow-up in clinic with CNM as scheduled 21 or sooner if her headache persists or she continues to feel \"off\" or has other concerns or questions.   All questions answered. Agrees with plan.     Subjective:  Divina Goyal is a 36 y.o.  at 27w6d, with an EDC of 21 who presented to [facility] for evaluation of increased blood pressure noted when she took it at [facility] today. She states she has been feeling kind of \"off\" today- mild headache, dizziness and so she took her blood pressure while out doing errands. It was in the 130s/70s. She called the on-call CNM and was encouraged to present for evaluation. Her headache is mild and she did take 2 Tylenol about an hour ago. She does get migraines. Denies epigastric pain, visual changes, leaking of fluid, bleeding, or changes in fetal movement.     AP chart reviewed. Followed by [facility] at [facility] with consistent AP care starting at 12 weeks GA. Noted risk factors: Pre-pregnant BMI 31, excessive weight gain to date (24lb), history of preeclmapsia    Objective:  Vital signs:   Heart Rate:  [75-78] 75  BP: (117-133)/(68-76) 133/76    FHR: 145 baseline, moderate variability, accels present, no decels. Tracing noncontiguous due to maternal position " changes and fetal movement.   Uterine contractions: none.     Physical Exam:   Lungs: clear to auscultation bilaterally, no wheeze. RRR.   Abdomen: SIUP, abdomen non-tender  SVE: deferred  Legs: Trace edema, DTR +2/+2, no clonus    Results:  Recent Results (from the past 24 hour(s))   ALT (current @ALT(SGPT)    Collection Time: 04/06/21  6:25 PM   Result Value Ref Range    ALT 11 0 - 45 U/L   AST (current @AST(SGOT)    Collection Time: 04/06/21  6:25 PM   Result Value Ref Range    AST 12 0 - 40 U/L   INR    Collection Time: 04/06/21  6:25 PM   Result Value Ref Range    INR 0.99 0.90 - 1.10   APTT(PTT)    Collection Time: 04/06/21  6:25 PM   Result Value Ref Range    PTT 29 24 - 37 seconds   HM2(CBC w/o Differential)    Collection Time: 04/06/21  6:25 PM   Result Value Ref Range    WBC 10.5 4.0 - 11.0 thou/uL    RBC 3.83 3.80 - 5.40 mill/uL    Hemoglobin 11.6 (L) 12.0 - 16.0 g/dL    Hematocrit 35.3 35.0 - 47.0 %    MCV 92 80 - 100 fL    MCH 30.3 27.0 - 34.0 pg    MCHC 32.9 32.0 - 36.0 g/dL    RDW 13.1 11.0 - 14.5 %    Platelets 236 140 - 440 thou/uL    MPV 9.7 8.5 - 12.5 fL   Type and Screen    Collection Time: 04/06/21  6:25 PM   Result Value Ref Range    ABORh O POS     Antibody Screen Negative Negative   Creatinine    Collection Time: 04/06/21  6:25 PM   Result Value Ref Range    Creatinine 0.57 (L) 0.60 - 1.10 mg/dL    GFR MDRD Af Amer >60 >60 mL/min/1.73m2    GFR MDRD Non Af Amer >60 >60 mL/min/1.73m2   Uric Acid    Collection Time: 04/06/21  6:25 PM   Result Value Ref Range    Uric Acid 3.1 2.0 - 7.5 mg/dL   Protein/Creatinine Ratio, Urine Random    Collection Time: 04/06/21  6:50 PM   Result Value Ref Range     Protein, Random Urine 10 mg/dL    Creatinine, Urine 58.9 mg/dL    Protein/Creatinine Ratio, Random Urine 0.17        Provider: DANELLE Whitten, REYES, IBCLC  [facility]  Midwifery    25 minutes spent on the date of the encounter doing chart review, review of test results,  patient visit and documentation

## 2021-06-17 NOTE — PROGRESS NOTES
"Next visit: Hemoglobin, ferritin, TSH and free T4.  GBS RV culture NOT indicated considering GBS bacteriuria this pregnancy.  Divina is in clinic alone today for her 34 week TIM. She reports that she has had some BH contractions, mostly in the evening or after a significant amount of activity. These do go away on their own if she puts her feet up and rests a bit. She also reports that her energy level is \"at a new high\" and believes this is due to Slow Fe supplementation.  She denies leaking of fluid, HA, visual changes or RUQ pain. Endorses regular fetal movement. Discussed when to call with labor, and she confirmed she has the numbers for [facility] CNM. EDPS , (\"never\" to #10).   labor precautions and danger signs and symptoms reviewed.  All questions answered.  Encouraged daily fetal movement counting.    Patient was seen with student, TESS Dumont who was present for learning. I personally assessed, examined and made clinical decisions reflected in the documentation.   DNAELLE Boston, REYES  "

## 2021-06-17 NOTE — TELEPHONE ENCOUNTER
Telephone Encounter by Gale Carter CMA at 10/29/2020  8:04 AM     Author: Gale Carter CMA Service: -- Author Type: Certified Medical Assistant    Filed: 10/29/2020  8:04 AM Encounter Date: 10/28/2020 Status: Signed    : Gale Carter CMA (Certified Medical Assistant)       Gloria Smith APRN,REYES      Considering chronic medical diagnoses, recommend confirmation of pregnancy earlier than 10 to 12 weeks if patient is amenable.

## 2021-06-17 NOTE — PROGRESS NOTES
"  32 week OB    S: Feels well overall. Reports she was able to get her 2nd COVID vaccine, which she is very happy about. Baby active.  Denies uterine cramping, vaginal bleeding or leaking of fluid    O: Vitals: /64 (Patient Site: Right Arm, Patient Position: Sitting, Cuff Size: Adult Regular)   Pulse 84   Ht 5' 9\" (1.753 m)   Wt (!) 256 lb (116.1 kg)   LMP 09/17/2020   Breastfeeding No   BMI 37.80 kg/m    BMI= Body mass index is 37.8 kg/m .  Exam:  Constitutional: healthy, alert and no distress  Respiratory: respirations even and unlabored  Gastrointestinal: Abdomen soft, non-tender. Fundus measures appropriate for gestational age. Fetal heart tones heard without difficulty and within normal limits  Psychiatric: mentation appears normal and affect normal/bright    A:     ICD-10-CM    1. Supervision of high-risk pregnancy of elderly multigravida  O09.529    2. Pregnancy related fatigue in third trimester  O26.813    3. Excessive weight gain during pregnancy in third trimester  O26.03    4. Hypothyroidism, unspecified type  E03.9    5. BMI 31.0-31.9,adult  Z68.31    6. Multigravida of advanced maternal age in third trimester  O09.523    7. GBS bacteriuria  R82.71      P: Discussed plans for labor. Discussed patient options for postpartum contraception. Patient is planning a vasectomy but isn't sure yet. Tdap recommended today. (15 days s/p 2nd covid vaccine.)  Offer Tdap next visit.  Reviewed 32 week checklist and updated Details list.  Encouraged patient to call with any questions or concerns.  Plan to recheck TSH, free T4 and ferritin level at 36 weeks with hemoglobin.  Return to clinic 2 weeks.    Patient was seen with student, TESS Dumont who was present for learning. I personally assessed, examined and made clinical decisions reflected in the documentation.   Gloria Smith CNM, APRN  "

## 2021-06-18 ENCOUNTER — AMBULATORY - HEALTHEAST (OUTPATIENT)
Dept: LAB | Facility: CLINIC | Age: 36
End: 2021-06-18

## 2021-06-18 ENCOUNTER — PRENATAL OFFICE VISIT - HEALTHEAST (OUTPATIENT)
Dept: MIDWIFE SERVICES | Facility: CLINIC | Age: 36
End: 2021-06-18

## 2021-06-18 DIAGNOSIS — R82.71 GBS BACTERIURIA: ICD-10-CM

## 2021-06-18 DIAGNOSIS — E03.9 ACQUIRED HYPOTHYROIDISM: ICD-10-CM

## 2021-06-18 DIAGNOSIS — O09.529 SUPERVISION OF HIGH-RISK PREGNANCY OF ELDERLY MULTIGRAVIDA: ICD-10-CM

## 2021-06-18 DIAGNOSIS — R79.0 LOW FERRITIN: ICD-10-CM

## 2021-06-18 DIAGNOSIS — O26.03 EXCESSIVE WEIGHT GAIN DURING PREGNANCY IN THIRD TRIMESTER: ICD-10-CM

## 2021-06-18 LAB
T4 FREE SERPL-MCNC: 0.9 NG/DL (ref 0.7–1.8)
TSH SERPL DL<=0.005 MIU/L-ACNC: 1.62 UIU/ML (ref 0.3–5)

## 2021-06-18 ASSESSMENT — MIFFLIN-ST. JEOR: SCORE: 1961.41

## 2021-06-18 NOTE — PATIENT INSTRUCTIONS - HE
Patient Instructions by Coretta Richards MD at 4/21/2020  1:30 PM     Author: Coretta Richards MD Service: -- Author Type: Physician    Filed: 4/21/2020  1:41 PM Encounter Date: 4/21/2020 Status: Signed    : Coretta Richards MD (Physician)       Patient Education     Lumbar Extension (Flexibility)    1. Lie face down on your belly, forehead on the floor. You can lie on a mat or towel.  2. Bend your arms next to your body and lift your upper body up onto your forearms. Your palms and forearms should be flat on the floor. Keep your belly and hips on the floor.  3. Hold your upper body up with your forearms for 20 seconds. Then slowly lower back down to the floor.  4. Repeat 2 times, or as instructed.  Date Last Reviewed: 3/10/2016    6512-0425 [employer]. [address] [place] [address]. All rights reserved. This information is not intended as a substitute for professional medical care. Always follow your healthcare professional's instructions.    Patient Education     Exercises to Strengthen Your Lower Back  Strong lower back and abdominal muscles work together to support your spine. The exercises below will help strengthen the lower back. It is important that you begin exercising slowly and increase levels gradually.  Always begin any exercise program with stretching. If you feel pain while doing any of these exercises, stop and talk to your doctor about a more specific exercise program that better suits your condition.   Low back stretch  The point of stretching is to make you more flexible and increase your range of motion. Stretch only as much as you are able. Stretch slowly. Do not push your stretch to the limit. If at any point you feel pain while stretching, this is your (temporary) limit.    Lie on your back with your knees bent and both feet on the ground.    Slowly raise your left knee to your chest as you flatten your lower back against the floor. Hold for 5 seconds.    Relax  and repeat the exercise with your right knee.    Do 10 of these exercises for each leg.    Repeat hugging both knees to your chest at the same time.  Building lower back strength  Start your exercise routine with 10 to 30 minutes a day, 1 to 3 times a day.  Initial exercises  Lying on your back:  1. Ankle pumps: Move your foot up and down, towards your head, and then away. Repeat 10 times with each foot.  2. Heel slides: Slowly bend your knee, drawing the heel of your foot towards you. Then slide your heel/foot from you, straightening your knee. Do not lift your foot off the floor (this is not a leg lift).  3. Abdominal contraction: Bend your knees and put your hands on your stomach. Tighten your stomach muscles. Hold for 5 seconds, then relax. Repeat 10 times.  4. Straight leg raise: Bend one leg at the knee and keep the other leg straight. Tighten your stomach muscles. Slowly lift your straight leg 6 to 12 inches off the floor and hold for up to 5 seconds. Repeat 10 times on each side.  Standin. Wall squats: Stand with your back against the wall. Move your feet about 12 inches away from the wall. Tighten your stomach muscles, and slowly bend your knees until they are at about a 45 degree angle. Do not go down too far. Hold about 5 seconds. Then slowly return to your starting position. Repeat 10 times.  2. Heel raises: Stand facing the wall. Slowly raise the heels of your feet up and down, while keeping your toes on the floor. If you have trouble balancing, you can touch the wall with your hands. Repeat 10 times.  More advanced exercises  When you feel comfortable enough, try these exercises.  1. Kneeling lumbar extension: Begin on your hands and knees. At the same time, raise and straighten your right arm and left leg until they are parallel to the ground. Hold for 2 seconds and come back slowly to a starting position. Repeat with left arm and right leg, alternating 10 times.  2. Prone lumbar extension: Lie  face down, arms extended overhead, palms on the floor. At the same time, raise your right arm and left leg as high as comfortably possible. Hold for 10 seconds and slowly return to start. Repeat with left arm and right leg, alternating 10 times. Gradually build up to 20 times. (Advanced: Repeat this exercise raising both arms and both legs a few inches off the floor at the same time. Hold for 5 seconds and release.)  3. Pelvic tilt: Lie on the floor on your back with your knees bent at 90 degrees. Your feet should be flat on the floor. Inhale, exhale, then slowly contract your abdominal muscles bringing your navel toward your spine. Let your pelvis rock back until your lower back is flat on the floor. Hold for 10 seconds while breathing smoothly.  4. Abdominal crunch: Perform a pelvic tilt (above) flattening your lower back against the floor. Holding the tension in your abdominal muscles, take another breath and raise your shoulder blades off the ground (this is not a full sit-up). Keep your head in line with your body (dont bend your neck forward). Hold for 2 seconds, then slowly lower.  Date Last Reviewed: 6/1/2016 2000-2017 [employer]. [address] [place] [address], [place] [address]. All rights reserved. This information is not intended as a substitute for professional medical care. Always follow your healthcare professional's instructions.

## 2021-06-20 NOTE — LETTER
Letter by Kelby Loyola LPN at      Author: Kelby Loyola LPN Service: -- Author Type: --    Filed:  Encounter Date: 7/22/2020 Status: (Other)       7/22/2020        Taylor W See [address] [place] [address]    This letter provides a written record that you were tested for COVID-19 on 7/17/20.     Your result was negative. This means that we didnt find the virus that causes COVID-19 in your sample. A test may show negative when you do actually have the virus. This can happen when the virus is in the early stages of infection, before you feel illness symptoms.    If you have symptoms   Stay home and away from others (self-isolate) until you meet ALL of the guidelines below:    Youve had no fever--and no medicine that reduces fever--for 3 full days (72 hours). And ?    Your other symptoms have gotten better. For example, your cough or breathing has improved. And?    At least 10 days have passed since your symptoms started.    During this time:    Stay home. Dont go to work, school or anywhere else.     Stay in your own room, including for meals. Use your own bathroom if you can.    Stay away from others in your home. No hugging, kissing or shaking hands. No visitors.    Clean high touch surfaces often (doorknobs, counters, handles, etc.). Use a household cleaning spray or wipes. You can find a full list on the EPA website at www.epa.gov/pesticide-registration/list-n-disinfectants-use-against-sars-cov-2.    Cover your mouth and nose with a mask, tissue or washcloth to avoid spreading germs.    Wash your hands and face often with soap and water.    Going back to work  Check with your employer for any guidelines to follow for going back to work.    Employers: This document serves as formal notice that your employee tested negative for COVID-19, as of the testing date shown above.

## 2021-06-25 NOTE — PROGRESS NOTES
"Divina is in clinic today for her 36 week TIM.  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  \"It is his dream job, but I selfishly want him home.\"  Therapeutic listening provided and emotional support given.  She reports continued BH contractions, but denies vaginal bleeding, LOF, HA, visual disturbances or RUQ pain.  Reports lower extremity edema bilaterally.  Recommend adequate p.o. fluids and lower extremity elevation along with periodic resting in left lateral position throughout the day.  Fatigue is back, although it feels different than it did in 1st trimester, more like regular tiredness, not as extreme. Hgb, Ferritin, TSH and Free T4 redrawn today.  Previous GBS bacteriuria this pregnancy, and therefore GBS RV culture NOT obtained today.  Divina is accepting of GBS IV antibiotic prophylaxis in labor.  Baby is active.  US prior to her clinic appt. today verified baby in a cephalic position. Defers a SVE today \"maybe next week\". Reviewed when to call [facility] REYES, answered all questions.     Patient was seen with student, TESS Dumont who was present for learning. I personally assessed, examined and made clinical decisions reflected in the documentation.   DANELLE Boston, REYES  "

## 2021-06-26 NOTE — PROGRESS NOTES
"Divina presents alone at 37/1 weeks  GBS bacteruria earlier in pregnancy, accepts antibiotics in labor or with ROM. Hgb/TSH/T4/ferritin drawn  At last visit 21 Divina had an US 6/3/21: vertex, normal fluid  Divina requests a VE today: 0.5cm/60%/-3, moderate, posterior    Prepregnancy BMI 31 Recommended weight gain 11-20 lbs, TWlbs. Encouraged decreasing simple carbohydrates, increasing exercise    Divina c/o abdominal itching over her striae. She has been using cocoa butter. Discussed that some people develop an irritation to this product in pregnancy. May want to consider Aquaphor Eucerin Ointment.    Divina shared that her epidural with [facility] was ineffective and she is concerned this will happen again. She did not feel like the \"nurses believed\" her when she said she could feel everything. Discussed strategies to avoid this from occurring again such as asking anesthesia to re-bolus or replace her epidural.    Divina reports she occasionally has discomfort after having a bowel movement. No hemorrhoids visualized with exam. Suggested she try Preparation H hemorrhoidal cream. If this is ineffective we can try Anusol     Feeling exhausted like she did when her Ferritin level was low. Reassured Divina that her ferritin is normal now. 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 She has been at home taking care of her toddler in the 90+ degree heat. Discussed that this may be part of the reason she is feeling so tired. Encouraged self care and to ask for childcare help now if she is able.     Reviewed warning signs of PTL and preeclampsia. She denies all. Encouraged to call with any concerns.  Reports good fetal movement    Has a breast pump      "

## 2021-06-29 ENCOUNTER — COMMUNICATION - HEALTHEAST (OUTPATIENT)
Dept: ENDOCRINOLOGY | Facility: CLINIC | Age: 36
End: 2021-06-29
Payer: COMMERCIAL

## 2021-07-04 ENCOUNTER — COMMUNICATION - HEALTHEAST (OUTPATIENT)
Dept: OBGYN | Facility: CLINIC | Age: 36
End: 2021-07-04

## 2021-07-04 NOTE — TELEPHONE ENCOUNTER
Telephone Encounter by Caro King APRN, CNM at 7/4/2021  4:57 PM     Author: Caro King APRN, CNM Service: -- Author Type: Midwife    Filed: 7/4/2021  5:21 PM Encounter Date: 7/4/2021 Status: Signed    : Caro King APRN, CNM (Midwife)       PHONE CALL TO ON-CALL CNM:  Divina calls reporting regular contractions for the past 2 hours.  They're about 6.5 min apart.  They feel like rico mercado.  Are mildly uncomfortable with a tightening sensation, but not super painful yet.  Can talk and walk through the contractions with ease.  No bloody show or leaking of fluid.  Baby has typical active movement.  Feels well hydrated.  Hasn't been outside in heat or exercising.    Was induced last time (cytotec, then SROM, then pitcoin, with true labor about 10 hr) so feeling like she's unsure when to come to the hospital for an evaluation.  Lives 20 min away.  Is GBS positive this pregnancy..    Talked about options of coming to hospital for evaluation now (due to multip status and +GBS) vs. hydrating and either resting or taking a bath and then seeing what happens with the contractions.  Is choosing to hydrate/rest and reevaluate.  Will call on-call CNM back PRN.    Reviewed to call with any advancing labor, with SROM, with bleeding, with decreased fetal movement, or any other concerns.  Voices understanding.  All questions answered.

## 2021-07-04 NOTE — ADDENDUM NOTE
Addendum Note by Mark Dyer CMA at 5/6/2021  3:20 PM     Author: Mark Dyer CMA Service: -- Author Type: Certified Medical Assistant    Filed: 5/7/2021  7:37 AM Encounter Date: 5/6/2021 Status: Signed    : Mark Dyer CMA (Certified Medical Assistant)    Addended by: MARK DYER on: 5/7/2021 07:37 AM        Modules accepted: Orders

## 2021-07-04 NOTE — PROGRESS NOTES
"Progress Notes by Kiya Robertson at 6/18/2021  1:20 PM     Author: Kiya Robertson Service: -- Author Type: Medical Student    Filed: 6/18/2021  4:02 PM Encounter Date: 6/18/2021 Status: Attested    : Kiya Robertson (Medical Student) Cosigner: Coretta Massey APRN, CNM at 6/18/2021  4:02 PM    Attestation signed by Coretta Massey APRN, CNM at 6/18/2021  4:02 PM    Present for and agree with exam and assessment.  DANELLE Alves CNM                  Divina is here alone for her prenatal visit. She reports feeling \"tired of being pregnant, but overall ok\". She has been swimming and staying hydrated in the hot weather. Edema bilaterally in the lower extremities, +2. Reports regular fetal movement, and feels that her baby is \"still up high\". Denies LOF, regular contractions, HA, ROQ pain. Discussed labor signs, when to call [facility] CNM, and post-dates care. Encouraged Divina to continue frequent position changes/movement, PNV and Fe supplementation. RTC in 1 week or PRN.    Patient was seen with student, TESS Dumont who was present for learning. I personally assessed, examined and made clinical decisions reflected in the documentation.  DANELLE Mcguire CNM       "

## 2021-07-05 ENCOUNTER — AMBULATORY - HEALTHEAST (OUTPATIENT)
Dept: LAB | Facility: CLINIC | Age: 36
End: 2021-07-05

## 2021-07-05 ENCOUNTER — AMBULATORY - HEALTHEAST (OUTPATIENT)
Dept: OBGYN | Facility: CLINIC | Age: 36
End: 2021-07-05

## 2021-07-05 DIAGNOSIS — Z33.1 PREGNANT STATE, INCIDENTAL: ICD-10-CM

## 2021-07-05 LAB
SARS-COV-2 PCR COMMENT: NORMAL
SARS-COV-2 RNA SPEC QL NAA+PROBE: NEGATIVE
SARS-COV-2 VIRUS SPECIMEN SOURCE: NORMAL

## 2021-07-06 ENCOUNTER — COMMUNICATION - HEALTHEAST (OUTPATIENT)
Dept: SCHEDULING | Facility: CLINIC | Age: 36
End: 2021-07-06

## 2021-07-09 ENCOUNTER — COMMUNICATION - HEALTHEAST (OUTPATIENT)
Dept: ADMINISTRATIVE | Facility: CLINIC | Age: 36
End: 2021-07-09

## 2021-07-09 NOTE — TELEPHONE ENCOUNTER
Telephone Encounter by Fabi Shultz at 7/9/2021 10:54 AM     Author: Fabi Shultz Service: -- Author Type: --    Filed: 7/9/2021 12:38 PM Encounter Date: 7/9/2021 Status: Addendum    : Niya Justice APRN, CNM (Midwife)    Related Notes: Original Note by Fabi Shultz filed at 7/9/2021 10:55 AM       12:38 PM Attempted phone call to patient. No answer and VM box full. DANELLE Mayer CNM      Pt needs pain medicine - she is still in pain, but has technically been discharged. She is still at [facility] as her son is still there. Pls call asap.

## 2021-07-09 NOTE — TELEPHONE ENCOUNTER
Telephone Encounter by Meagan Martinez at 7/9/2021  9:50 AM     Author: Meagan Martinez Service: -- Author Type: --    Filed: 7/9/2021  9:53 AM Encounter Date: 7/9/2021 Status: Signed    : Meagan Martinez       Pt is 3 days post and having pain in her kidney area and it has increased since yesterday. Please call to discuss.

## 2021-07-10 ENCOUNTER — COMMUNICATION - HEALTHEAST (OUTPATIENT)
Dept: OBGYN | Facility: CLINIC | Age: 36
End: 2021-07-10

## 2021-07-10 NOTE — TELEPHONE ENCOUNTER
"Telephone Encounter by Iris Nath APRN, CNM at 7/10/2021 11:24 AM     Author: Iris Nath APRN, CNM Service: -- Author Type: Midwife    Filed: 7/10/2021 11:25 AM Encounter Date: 7/9/2021 Status: Signed    : Iris Nath APRN, CNM (Midwife)       Patient was instructed by nursing staff to be evaluated in the ED for worsening symptoms of \"kidney pain\" as she was no longer inpatient.        "

## 2021-07-10 NOTE — TELEPHONE ENCOUNTER
Telephone Encounter by Fabi Ni APRN, CNM at 7/10/2021  5:40 PM     Author: Fabi Ni APRN, CNM Service: -- Author Type: Midwife    Filed: 7/10/2021  5:50 PM Encounter Date: 7/10/2021 Status: Signed    : Fabi Ni APRN, CNM (Midwife)       Divina is 4 days postpartum after   on 21. Her baby was admitted to the [facility] and was discharged home yesterday. She calls today with report of a blood clot that is approx the size of a gold ball that she passed just now. Her bleeding had slowed down so that she was only using a panty liner and then she passed the clot. Denies active bleeding or bright red blood.Denies dizziness, has had a mild headache and is not concerned about this, thinks this is related to her epidural. Advised to rest, stay hydrated, monitor for increased bleeding or more blood clots and call back prn.

## 2021-07-11 PROBLEM — O09.523 MULTIGRAVIDA OF ADVANCED MATERNAL AGE IN THIRD TRIMESTER: Status: RESOLVED | Noted: 2020-11-30 | Resolved: 2021-07-07

## 2021-07-11 PROBLEM — O26.03 EXCESSIVE WEIGHT GAIN DURING PREGNANCY IN THIRD TRIMESTER: Status: RESOLVED | Noted: 2021-04-15 | Resolved: 2021-07-07

## 2021-07-11 PROBLEM — O26.813 PREGNANCY RELATED FATIGUE IN THIRD TRIMESTER: Status: RESOLVED | Noted: 2021-02-09 | Resolved: 2021-07-07

## 2021-07-11 PROBLEM — Z37.9 NORMAL LABOR: Status: RESOLVED | Noted: 2021-07-06 | Resolved: 2021-07-06

## 2021-07-11 PROBLEM — O44.40 LOW-LYING PLACENTA: Status: RESOLVED | Noted: 2021-01-18 | Resolved: 2021-07-07

## 2021-07-12 ENCOUNTER — AMBULATORY - HEALTHEAST (OUTPATIENT)
Dept: MIDWIFE SERVICES | Facility: CLINIC | Age: 36
End: 2021-07-12
Payer: COMMERCIAL

## 2021-07-14 ENCOUNTER — TELEPHONE (OUTPATIENT)
Dept: MIDWIFE SERVICES | Facility: CLINIC | Age: 36
End: 2021-07-14

## 2021-07-14 DIAGNOSIS — O92.29 SORE NIPPLES DUE TO LACTATION: Primary | ICD-10-CM

## 2021-07-14 PROBLEM — O32.0XX0 UNSTABLE FETAL LIE, SINGLE OR UNSPECIFIED FETUS: Status: RESOLVED | Noted: 2019-10-31 | Resolved: 2019-11-02

## 2021-07-14 PROBLEM — O26.813 PREGNANCY RELATED FATIGUE IN THIRD TRIMESTER: Status: RESOLVED | Noted: 2021-02-09 | Resolved: 2021-07-07

## 2021-07-14 PROBLEM — Z37.9 NORMAL LABOR: Status: RESOLVED | Noted: 2021-07-06 | Resolved: 2021-07-06

## 2021-07-14 PROBLEM — Z34.00 SUPERVISION OF NORMAL FIRST PREGNANCY: Status: RESOLVED | Noted: 2019-05-09 | Resolved: 2019-11-02

## 2021-07-14 PROBLEM — O14.93 PRE-ECLAMPSIA IN THIRD TRIMESTER: Status: RESOLVED | Noted: 2019-11-01 | Resolved: 2019-12-30

## 2021-07-14 PROBLEM — G56.00 CARPAL TUNNEL SYNDROME DURING PREGNANCY: Status: RESOLVED | Noted: 2019-09-05 | Resolved: 2019-12-30

## 2021-07-14 PROBLEM — O09.523 MULTIGRAVIDA OF ADVANCED MATERNAL AGE IN THIRD TRIMESTER: Status: RESOLVED | Noted: 2020-11-30 | Resolved: 2021-07-07

## 2021-07-14 PROBLEM — O44.40 LOW-LYING PLACENTA: Status: RESOLVED | Noted: 2021-01-18 | Resolved: 2021-07-07

## 2021-07-14 PROBLEM — O28.3 ABNORMAL FETAL ULTRASOUND: Status: RESOLVED | Noted: 2019-06-19 | Resolved: 2019-08-01

## 2021-07-14 PROBLEM — O32.2XX0 TRANSVERSE LIE OF FETUS: Status: RESOLVED | Noted: 2019-10-10 | Resolved: 2019-10-21

## 2021-07-14 PROBLEM — O26.899 CARPAL TUNNEL SYNDROME DURING PREGNANCY: Status: RESOLVED | Noted: 2019-09-05 | Resolved: 2019-12-30

## 2021-07-14 PROBLEM — O26.00 EXCESSIVE WEIGHT GAIN AFFECTING PREGNANCY: Status: RESOLVED | Noted: 2019-06-19 | Resolved: 2019-11-01

## 2021-07-14 PROBLEM — O26.03 EXCESSIVE WEIGHT GAIN DURING PREGNANCY IN THIRD TRIMESTER: Status: RESOLVED | Noted: 2021-04-15 | Resolved: 2021-07-07

## 2021-07-14 PROBLEM — Z34.90 PREGNANT: Status: RESOLVED | Noted: 2019-10-31 | Resolved: 2019-11-01

## 2021-07-14 PROBLEM — Z34.03 ENCOUNTER FOR SUPERVISION OF NORMAL FIRST PREGNANCY IN THIRD TRIMESTER: Status: RESOLVED | Noted: 2019-08-07 | Resolved: 2019-12-30

## 2021-07-14 NOTE — TELEPHONE ENCOUNTER
Returned phone call. Divina is 7 days postpartum and had diarrhea for approximately 12 hours. After eating something and drinking some water around noon she is feeling better and hasn't had diarrhea since. Otherwise feeling pretty well. Bleeding is light. Baby is nursing well. Has some cracking on one nipple- saw pediatrician today and requesting Rx for APNO. Sent to pharmacy as requested.     Warning signs related to infection and bleeding were reviewed as requested. Encouraged to consider WIC visit if diarrhea persists due to rare but possible complication of C. diff in setting of intrapartum antibiotic therapy. Enc to return to outpatient lactation or call for additional lactation support.     All questions answered, agrees with plan.    Maribel Esquivel, DANELLE, CNM, IBCLC  [facility]  Midwifery

## 2021-07-21 ENCOUNTER — TELEPHONE (OUTPATIENT)
Dept: MIDWIFE SERVICES | Facility: CLINIC | Age: 36
End: 2021-07-21

## 2021-07-21 NOTE — TELEPHONE ENCOUNTER
2 weeks post partum and having headaches and some blurred vision. She has an appointment scheduled for tomorrow but also just wanted to check with a Midwife today. She had pre-eclampsyia after her 1st one was born.Please call to discuss.

## 2021-07-21 NOTE — TELEPHONE ENCOUNTER
"7/21/2021 telephone call from 2:29 PM to 2:44 PM     A: 1.  2 weeks postpartum, status post normal spontaneous vaginal birth  2.  First-degree perineal laceration, repaired  3.  Lactating mother  4.  Sleep deprived  5.  Headache  6.  History of postpartum gestational hypertension    P: This writer was made aware of the greater than 4-hour wait in the emergency department.  Chart review of her blood pressure postpartum well inpatient revealed a normotensive trend.  This writer agrees to have all blood pressure measured.  Take acetaminophen 1000 g p.o. now.  Call back with blood pressure reading AND to discuss whether headache has improved after both ibuprofen AND acetaminophen.  Scheduled 2-week postpartum appointment with this writer tomorrow morning at 8:40 PM.  Divina has verbal understanding of and agrees with the plan of care.    S: Divina contacted this writer to report a headache which she believes is associated with sleep deprivation secondary to being 2 weeks postpartum and breast-feeding with a toddler at home.  This is NOT the worst headache of her life.  She does not report right upper quadrant abdominal pain or scotomata.  She states that her vision may be intermittently blurry.  Upon her admission to the hospital for her baby's birth 2 weeks ago, her blood pressure was elevated initially, but normalized, and HELLP labs were WNL.  \"I have a nurse friend with a home blood pressure device.  I can call her to come over and check my blood pressure.\"  She endorses taking ibuprofen 800 mg 2 hours prior without complete relief.  Overall, she does not feel ill.  She denies fever, chills, breast pain, abdominal pain or excessive vaginal bleeding/lochia.    O: Alert and in no apparent distress with good insight.  Laughing and gleeful on the phone.    _________________________________  7/21/2021 at 4:37 PM    Divina states that her headache is much improved after taking the acetaminophen as recommended, \"it is " "partly there.\"  Blood pressure was 122/78 using friend's home device.    Since BP is normal and headache improved with acetaminophen as well as schedule appointment with me tomorrow morning, this writer does not recommend emergency room evaluation at this time.      However, if symptoms resume, persist despite the use of ibuprofen or acetaminophen or worsen, emergency room evaluation is warranted prior to tomorrow morning appointment with me.  Divina has verbal understanding of and agrees with the plan of care.  "

## 2021-07-22 ENCOUNTER — OFFICE VISIT (OUTPATIENT)
Dept: MIDWIFE SERVICES | Facility: CLINIC | Age: 36
End: 2021-07-22
Payer: COMMERCIAL

## 2021-07-22 VITALS
WEIGHT: 237 LBS | HEART RATE: 68 BPM | SYSTOLIC BLOOD PRESSURE: 114 MMHG | BODY MASS INDEX: 35.1 KG/M2 | HEIGHT: 69 IN | DIASTOLIC BLOOD PRESSURE: 82 MMHG

## 2021-07-22 DIAGNOSIS — Z30.8 ENCOUNTER FOR OTHER CONTRACEPTIVE MANAGEMENT: ICD-10-CM

## 2021-07-22 DIAGNOSIS — E03.9 HYPOTHYROIDISM, UNSPECIFIED TYPE: ICD-10-CM

## 2021-07-22 PROBLEM — R79.0 LOW FERRITIN: Status: RESOLVED | Noted: 2021-04-15 | Resolved: 2021-07-22

## 2021-07-22 PROBLEM — Z13.79 GENETIC SCREENING: Status: RESOLVED | Noted: 2020-12-19 | Resolved: 2021-07-22

## 2021-07-22 PROBLEM — R82.71 GBS BACTERIURIA: Status: RESOLVED | Noted: 2021-02-09 | Resolved: 2021-07-22

## 2021-07-22 PROCEDURE — 99024 POSTOP FOLLOW-UP VISIT: CPT | Performed by: ADVANCED PRACTICE MIDWIFE

## 2021-07-22 RX ORDER — PNV NO.95/FERROUS FUM/FOLIC AC 28MG-0.8MG
1 TABLET ORAL
COMMUNITY
End: 2022-01-25

## 2021-07-22 RX ORDER — IBUPROFEN 800 MG/1
800 TABLET, FILM COATED ORAL
COMMUNITY
Start: 2021-07-08 | End: 2021-12-01

## 2021-07-22 ASSESSMENT — ANXIETY QUESTIONNAIRES
IF YOU CHECKED OFF ANY PROBLEMS ON THIS QUESTIONNAIRE, HOW DIFFICULT HAVE THESE PROBLEMS MADE IT FOR YOU TO DO YOUR WORK, TAKE CARE OF THINGS AT HOME, OR GET ALONG WITH OTHER PEOPLE: NOT DIFFICULT AT ALL
7. FEELING AFRAID AS IF SOMETHING AWFUL MIGHT HAPPEN: NOT AT ALL
1. FEELING NERVOUS, ANXIOUS, OR ON EDGE: NOT AT ALL
GAD7 TOTAL SCORE: 2
2. NOT BEING ABLE TO STOP OR CONTROL WORRYING: SEVERAL DAYS
5. BEING SO RESTLESS THAT IT IS HARD TO SIT STILL: NOT AT ALL
6. BECOMING EASILY ANNOYED OR IRRITABLE: SEVERAL DAYS
3. WORRYING TOO MUCH ABOUT DIFFERENT THINGS: NOT AT ALL

## 2021-07-22 ASSESSMENT — PATIENT HEALTH QUESTIONNAIRE - PHQ9: 5. POOR APPETITE OR OVEREATING: NOT AT ALL

## 2021-07-22 ASSESSMENT — MIFFLIN-ST. JEOR: SCORE: 1834.4

## 2021-07-22 NOTE — PROGRESS NOTES
"Assessment:   1.  2 weeks postpartum status post normal spontaneous vaginal birth  2.  First-degree perineal laceration, repaired  3.  Lactating mother  4.  Hypothyroidism  5.  History of preeclampsia after first birth, normotensive today  6.  Headache RESOLVED    Plan:     -Routine postpartum care  -Therapeutic listening, emotional support.  -Outpatient lactation resources reviewed including [facility], [employer], community groups. Lactation referral provided as needed.  -Reviewed PP depression, anxiety, and psychosis s/sx, self care measures to reduce risk, safety plan and crisis numbers, and when to call for further resources. Written information provided for community resources and instructed to call on-call CNM with concerns or schedule clinic visit as needed.  -Return to clinic in 4 weeks for a routine exam following birth      Subjective:   Divina Goyal is a 36 year old female who presents for a postpartum follow up visit. She is 2 weeks postpartum following a normal spontaneous vaginal birth with a first-degree perineal laceration, repaired. The delivery was at 40+6 gestational weeks. I have fully reviewed the prenatal and intrapartum course. The amount and color of the lochia is appropriate for the duration of recovery. Divina feels well and postpartum course has been stable. Baby Shadi's  course has been stable. The baby, Shadi, is being fed by breast. She denies breast discomfort, pain with feedings, concerns about baby's weight gain and concerns about milk supply. Voiding without difficulty, lochia normal, tolerating normal diet, and passing flatus and normal bowel movements.  Pain is well controlled with current medications. Divina has history of depression and anxiety and offers no emotional concerns.  Postpartum depression screening score: , negative #10.  LG-7: 2 \"not difficult at all.\"    Please see telephone note from yesterday.  Experienced a headache with blurry " "vision which resolved with the use of both ibuprofen/acetaminophen, oral hydration and more sleep.    The following portions of the patient's history were reviewed and updated as appropriate: allergies, current medications, problem list, past family history, past medical history, past social history, past surgical history and problem list    Review of Systems  General:  Denies problem  Eyes: Denies problem  Ears/Nose/Throat: Denies problem  Cardiovascular: Denies problem  Respiratory:  Denies problem  Gastrointestinal:  Denies problem, Genitourinary: Denies problem  Musculoskeletal:  Denies problem  Skin: Denies problem  Neurologic: Denies problem  Psychiatric: Denies problem  Endocrine: Denies problem  Heme/Lymphatic: Denies problem   Allergic/Immunologic: Denies problem          Objective:      [unfilled]  Vitals:    07/22/21 0849   BP: 114/82   Pulse: 68   Weight: 107.5 kg (237 lb)   Height: 1.753 m (5' 9\")         Physical Exam:  General Appearance: Alert, cooperative, no distress, appears stated age  Skin: Skin color, texture, turgor normal, no rashes or lesions.  INÉS: grossly normal; otoscopic and opthalmic exam not performed.   Respiratory: Normal respiratory effort  Cardiovascular: No peripheral edema.  Musculoskeletal: No digital cyanosis. Normal gait and station. Moves all limbs freely.  Neuro: Cranial nerves II-XII grossly intact.  Psych: Intact judgment and insight. OX3 and conversational.     Total time with patient 25 minutes with >50% on education, counseling and coordination of care.  Gloria Smith CNM    "

## 2021-07-23 ASSESSMENT — ANXIETY QUESTIONNAIRES: GAD7 TOTAL SCORE: 2

## 2021-08-04 ENCOUNTER — MYC MEDICAL ADVICE (OUTPATIENT)
Dept: MIDWIFE SERVICES | Facility: CLINIC | Age: 36
End: 2021-08-04

## 2021-08-15 ENCOUNTER — HEALTH MAINTENANCE LETTER (OUTPATIENT)
Age: 36
End: 2021-08-15

## 2021-08-19 ENCOUNTER — PRENATAL OFFICE VISIT (OUTPATIENT)
Dept: MIDWIFE SERVICES | Facility: CLINIC | Age: 36
End: 2021-08-19

## 2021-08-19 VITALS
WEIGHT: 233 LBS | BODY MASS INDEX: 34.51 KG/M2 | HEIGHT: 69 IN | HEART RATE: 84 BPM | SYSTOLIC BLOOD PRESSURE: 122 MMHG | DIASTOLIC BLOOD PRESSURE: 58 MMHG

## 2021-08-19 DIAGNOSIS — Z30.8 ENCOUNTER FOR OTHER CONTRACEPTIVE MANAGEMENT: ICD-10-CM

## 2021-08-19 DIAGNOSIS — M79.671 PAIN IN BOTH FEET: ICD-10-CM

## 2021-08-19 DIAGNOSIS — N81.89 PELVIC FLOOR WEAKNESS IN FEMALE: ICD-10-CM

## 2021-08-19 DIAGNOSIS — E03.9 HYPOTHYROIDISM, UNSPECIFIED TYPE: ICD-10-CM

## 2021-08-19 DIAGNOSIS — M79.672 PAIN IN BOTH FEET: ICD-10-CM

## 2021-08-19 DIAGNOSIS — L73.2 HIDRADENITIS SUPPURATIVA: ICD-10-CM

## 2021-08-19 LAB
HGB BLD-MCNC: 13.9 G/DL (ref 11.7–15.7)
T4 FREE SERPL-MCNC: 1.49 NG/DL (ref 0.7–1.8)
TSH SERPL DL<=0.005 MIU/L-ACNC: 0.01 UIU/ML (ref 0.3–5)

## 2021-08-19 PROCEDURE — 84439 ASSAY OF FREE THYROXINE: CPT | Performed by: ADVANCED PRACTICE MIDWIFE

## 2021-08-19 PROCEDURE — 85018 HEMOGLOBIN: CPT | Performed by: ADVANCED PRACTICE MIDWIFE

## 2021-08-19 PROCEDURE — 84443 ASSAY THYROID STIM HORMONE: CPT | Performed by: ADVANCED PRACTICE MIDWIFE

## 2021-08-19 PROCEDURE — 36415 COLL VENOUS BLD VENIPUNCTURE: CPT | Performed by: ADVANCED PRACTICE MIDWIFE

## 2021-08-19 PROCEDURE — 99207 PR POST PARTUM EXAM: CPT | Performed by: ADVANCED PRACTICE MIDWIFE

## 2021-08-19 ASSESSMENT — MIFFLIN-ST. JEOR: SCORE: 1816.26

## 2021-08-19 NOTE — PROGRESS NOTES
6-week Postpartum Visit:     Assessment:   -Normal 6 week postpartum exam s/p NSVB  -Lactating mother  -Hypothyroidism  -Hx of PPD & PPA  -1st degree perineal laceration (healed)  -Hx of pre-eclampsia  -Bilateral foot pain  -Pelvic floor weakness  -hidradenitis suppurativa    Plan:   -Reviewed warning signs of postpartum depression.   -PP exercises reviewed and encouraged modified abdominal crunches and Kegels daily. Encouraged integrating formal exercise, such as walking 20-30mns daily.   -Warning signs of breast infections of mastitis and thrush reviewed.   -Encouraged to continue with PNV, Vitamin D and DHA supplements.   -Return of fertility discussed. Plans vasectomy and condoms for contraception.   -Reviewed warning signs of pelvic pain, excessive bleeding or abdominal pain, fever/chills, or signs of breast infection.   -Referral given for pelvic floor PT, dermatology and podiatrity.  -Labs today: TSH/FT4, Hgb  -RTC for routine health maintenance or sooner as needed.     TT with patient 40 mns, >50% time spent in counseling or coordination of care.     Subjective:   Divina is a 37 yo, here for her 6 week postpartum exam. She is integrating birth experience/care and transition well. Bleeding and uterine cramping have ceased. Denies pain. Reports normal bowel and bladder functioning. EPDS score 0, LG-7 score 2. Reports good family/friend support.  Breastfeeding well-established. Feeding q 2-4hours.   Has not resumed intercourse.   Divina does note some pelvic floor weakness, which she would like a PT referral for. Notes that if she waits too long, she feels like she won't be able to hold her bladder. She has not had actually bladder leakage. Also shares that she has some pain in the balls of her feet, particularly when she walks. Discussed compression or arch support socks, supportive footwear and referral to podiatry if it doesn't subside. This was also experienced with the birth of her first child, and it  eventually subsided on it's own within 6 months. Due to her ongoing hypothyroidism, we will draw her TSH and FT4 today. Divina also requests a dermatology referral for her hidradenitis suppurativa which is mild but still apparent in her pelvic area. Denies other pain or concerns. Plans to use condoms for contraception until Derik has a vasectomy.     Review of Systems  A comprehensive review of systems was negative.      Objective:     Physical Exam:  General: Pleasant, articulate, well-groomed, well-nourished female.  Not in any apparent distress.  Neck: Supple.  Thyroid: Small, symmetrical, no nodules noted.  Lungs: Clear to auscultation bilaterally, and a nonlabored breathing pattern.  Cardio: Regular rate and rhythm, negative for murmur.   Breasts: Symmetrical, nontender, no masses, negative lymphadenopathy, negative nipple discharge.  Abdomen: Soft, nontender, no masses, negative CVAT.  External genitalia: Normal hair distribution, no lesions. Well approximated and healed 1st degree laceration.  Urethral opening: Without lesions, or tenderness.   Bladder: Without masses, or tenderness.  Vagina: Pink, rugated, normal-appearing discharge.  Cervix: closed, thick and long.  Pap smear not obtained.   Bimanual: Finds uterus small, mobile, nontender, no masses.  Negative CMT.  Adnexa, without masses or tenderness.    Lower extremities: +1 reflexes, no significant edema.    Patient was seen with student, TESS Dumont who was present for learning. I personally assessed, examined and made clinical decisions reflected in the documentation.   DANELLE Sanabria, REYES

## 2021-08-21 PROBLEM — Z87.59 HISTORY OF PRE-ECLAMPSIA: Status: RESOLVED | Noted: 2020-11-12 | Resolved: 2021-08-21

## 2021-08-21 PROBLEM — O09.529 SUPERVISION OF HIGH-RISK PREGNANCY OF ELDERLY MULTIGRAVIDA: Status: RESOLVED | Noted: 2020-12-17 | Resolved: 2021-08-21

## 2021-08-23 DIAGNOSIS — M79.673 PAIN OF FOOT, UNSPECIFIED LATERALITY: Primary | ICD-10-CM

## 2021-08-30 ASSESSMENT — ANXIETY QUESTIONNAIRES
3. WORRYING TOO MUCH ABOUT DIFFERENT THINGS: NOT AT ALL
1. FEELING NERVOUS, ANXIOUS, OR ON EDGE: NOT AT ALL
7. FEELING AFRAID AS IF SOMETHING AWFUL MIGHT HAPPEN: NOT AT ALL
IF YOU CHECKED OFF ANY PROBLEMS ON THIS QUESTIONNAIRE, HOW DIFFICULT HAVE THESE PROBLEMS MADE IT FOR YOU TO DO YOUR WORK, TAKE CARE OF THINGS AT HOME, OR GET ALONG WITH OTHER PEOPLE: SOMEWHAT DIFFICULT
5. BEING SO RESTLESS THAT IT IS HARD TO SIT STILL: NOT AT ALL
6. BECOMING EASILY ANNOYED OR IRRITABLE: MORE THAN HALF THE DAYS

## 2021-08-30 ASSESSMENT — PATIENT HEALTH QUESTIONNAIRE - PHQ9: 5. POOR APPETITE OR OVEREATING: NOT AT ALL

## 2021-09-02 ENCOUNTER — OFFICE VISIT (OUTPATIENT)
Dept: PODIATRY | Facility: CLINIC | Age: 36
End: 2021-09-02
Attending: MIDWIFE
Payer: COMMERCIAL

## 2021-09-02 VITALS — WEIGHT: 233 LBS | HEART RATE: 86 BPM | HEIGHT: 69 IN | OXYGEN SATURATION: 97 % | BODY MASS INDEX: 34.51 KG/M2

## 2021-09-02 DIAGNOSIS — M77.41 METATARSALGIA OF BOTH FEET: ICD-10-CM

## 2021-09-02 DIAGNOSIS — M20.41 HAMMER TOE OF RIGHT FOOT: ICD-10-CM

## 2021-09-02 DIAGNOSIS — M77.42 METATARSALGIA OF BOTH FEET: ICD-10-CM

## 2021-09-02 DIAGNOSIS — M21.6X9 ACQUIRED CAVUS FOOT DEFORMITY: Primary | ICD-10-CM

## 2021-09-02 PROCEDURE — 99244 OFF/OP CNSLTJ NEW/EST MOD 40: CPT | Performed by: PODIATRIST

## 2021-09-02 ASSESSMENT — MIFFLIN-ST. JEOR: SCORE: 1816.26

## 2021-09-02 ASSESSMENT — PAIN SCALES - GENERAL: PAINLEVEL: EXTREME PAIN (8)

## 2021-09-02 NOTE — LETTER
9/2/2021         RE: Divina MORALES See  [address]  [place] [address]        Dear Colleague,    Thank you for referring your patient, Divina Goyal, to the [facility]. Please see a copy of my visit note below.    FOOT AND ANKLE SURGERY/PODIATRY CONSULT NOTE         ASSESSMENT:   Cavus foot deformity bilaterally  Hammertoe second toe right foot  Metatarsalgia bilateral feet      TREATMENT:  I have recommended orthotics.  I have also recommended hammertoe correction second toe right foot to correct the painful hammertoe deformity.  The patient was informed that this procedure would be done on an outpatient basis under local anesthesia with IV sedation.  She was told the procedure takes approximately 10 minutes to perform.  She would then be discharged weightbearing in a postop shoe for 3 weeks.  The patient was asked to go n.p.o. at midnight prior to the procedure and she was asked to see her primary care physician for preoperative consultation.  All pertinent questions were invited and answered.        HPI: I was asked to see Divina MORALES Jay Jay today to evaluate and treat bilateral foot pain.  The patient stated that she has had severe pain in the ball of her feet for the past 2 months.  It is an achy type pain which is aggravated with prolonged weightbearing and ambulation.  She denies any trauma to her feet.  She has not had any associated redness or swelling.  The pain is relieved only with nonweightbearing.  She denies any previous treatment.  Is not had any associated redness or swelling.  The patient also complained of a painful hammertoe second toe right foot.  She stated that all her shoes aggravate this particular toe.  It is quite irritating.  She has not had any redness or swelling involving the second toe right foot.  Pain is only relieved after removing her shoe gear.  The patient was seen in consultation at the request of Bee Buchanan CNM for evaluation and treatment of  "bilateral foot pain.     Past Medical History:   Diagnosis Date     Abnormal Pap smear of cervix      Chickenpox      Headache      HPV (human papilloma virus) infection      Hypothyroidism      Perineal laceration during delivery 11/01/2019    second degree     Preeclampsia     delivered 11/1/19     Seasonal allergies      Teratoma of ovary 04/10/2019    bilateral     Teratoma of ovary 04/09/2019    Bilateral, surgically removed during pregnancy     Thyroid disease      UTI (urinary tract infection)        Social History     Socioeconomic History     Marital status:      Spouse name: Derik     Number of children: 2     Years of education: Not on file     Highest education level: Not on file   Occupational History     Not on file   Tobacco Use     Smoking status: Former Smoker     Packs/day: 0.00     Smokeless tobacco: Never Used     Tobacco comment: quit at age 19   Substance and Sexual Activity     Alcohol use: Not Currently     Drug use: Never     Comment: Drug use: Denies     Sexual activity: Yes     Partners: Male   Other Topics Concern     Not on file   Social History Narrative    . Has 4 month old son \"Cody\".  Works part time at [employer].     Social Determinants of Health     Financial Resource Strain:      Difficulty of Paying Living Expenses:    Food Insecurity:      Worried About Running Out of Food in the Last Year:      Ran Out of Food in the Last Year:    Transportation Needs:      Lack of Transportation (Medical):      Lack of Transportation (Non-Medical):    Physical Activity:      Days of Exercise per Week:      Minutes of Exercise per Session:    Stress:      Feeling of Stress :    Social Connections:      Frequency of Communication with Friends and Family:      Frequency of Social Gatherings with Friends and Family:      Attends Adventism Services:      Active Member of Clubs or Organizations:      Attends Club or Organization Meetings:      Marital Status:    Intimate Partner " "Violence:      Fear of Current or Ex-Partner:      Emotionally Abused:      Physically Abused:      Sexually Abused:         No Known Allergies       Current Outpatient Medications:      APNO CREA ointment, Apply 31 g topically as needed (for nipple soreness) Apply to nipples sparingly after each feeding. Do not wash or wipe off. (Patient not taking: Reported on 8/19/2021), Disp: 30.6 g, Rfl: 1     Ferrous Gluconate (IRON 27 PO), , Disp: , Rfl:      ibuprofen (ADVIL/MOTRIN) 800 MG tablet, Take 800 mg by mouth, Disp: , Rfl:      levothyroxine (SYNTHROID/LEVOTHROID) 75 MCG tablet, Take 75 mcg by mouth daily, Disp: , Rfl:      Prenatal Vit-Fe Fumarate-FA (PRENATAL VITAMIN) 27-0.8 MG TABS, Take 1 tablet by mouth, Disp: , Rfl:      Family History   Problem Relation Age of Onset     Alcoholism Mother      Substance Abuse Mother      Early Death Mother      Mental Illness Mother      Cerebrovascular Disease Mother      Alcoholism Father      Arthritis Father      Hypertension Father      Alcoholism Brother      Substance Abuse Maternal Grandmother      Substance Abuse Maternal Grandfather      Cancer Maternal Grandfather      Hypertension Paternal Grandfather      Arthritis Paternal Aunt      Substance Abuse Paternal Uncle         Social History     Socioeconomic History     Marital status:      Spouse name: Derik     Number of children: 2     Years of education: Not on file     Highest education level: Not on file   Occupational History     Not on file   Tobacco Use     Smoking status: Former Smoker     Packs/day: 0.00     Smokeless tobacco: Never Used     Tobacco comment: quit at age 19   Substance and Sexual Activity     Alcohol use: Not Currently     Drug use: Never     Comment: Drug use: Denies     Sexual activity: Yes     Partners: Male   Other Topics Concern     Not on file   Social History Narrative    . Has 4 month old son \"Coyd\".  Works part time at [employer].     Social Determinants of Health "     Financial Resource Strain:      Difficulty of Paying Living Expenses:    Food Insecurity:      Worried About Running Out of Food in the Last Year:      Ran Out of Food in the Last Year:    Transportation Needs:      Lack of Transportation (Medical):      Lack of Transportation (Non-Medical):    Physical Activity:      Days of Exercise per Week:      Minutes of Exercise per Session:    Stress:      Feeling of Stress :    Social Connections:      Frequency of Communication with Friends and Family:      Frequency of Social Gatherings with Friends and Family:      Attends Yazdanism Services:      Active Member of Clubs or Organizations:      Attends Club or Organization Meetings:      Marital Status:    Intimate Partner Violence:      Fear of Current or Ex-Partner:      Emotionally Abused:      Physically Abused:      Sexually Abused:         Review of Systems - Patient denies fever, chills, rash, wound, stiffness, limping, numbness, weakness, heart burn, blood in stool, chest pain with activity, calf pain when walking, shortness of breath with activity, chronic cough, easy bleeding/bruising, swelling of ankles, excessive thirst, fatigue, depression, anxiety.  Patient admits to bilateral foot pain and a painful hammertoe second toe right foot.      OBJECTIVE:  Appearance: alert, well appearing, and in no distress.    There were no vitals taken for this visit.     There is no height or weight on file to calculate BMI.     General appearance: Patient is alert and fully cooperative with history & exam.  No sign of distress is noted during the visit.  Psychiatric: Affect is pleasant & appropriate.  Patient appears motivated to improve health.  Respiratory: Breathing is regular & unlabored while sitting.  HEENT: Hearing is intact to spoken word.  Speech is clear.  No gross evidence of visual impairment that would impact ambulation.    Vascular: Dorsalis pedis and posterior tibial pulses are palpable. There is pedal hair  growth bilaterally.  CFT < 3 sec from anterior tibial surface to distal digits bilaterally. There is no appreciable edema noted.  Dermatologic: Turgor and texture are within normal limits. No coloration or temperature changes. No primary or secondary lesions noted.  Neurologic: All epicritic and proprioceptive sensations are grossly intact bilaterally.  Musculoskeletal: All active and passive ankle, subtalar, midtarsal, and 1st MPJ range of motion are grossly intact without pain or crepitus, with the exception of none. Manual muscle strength is within normal limits bilaterally. All dorsiflexors, plantarflexors, invertors, evertors are intact bilaterally. Tenderness present to subsecond third and fourth metatarsal heads bilaterally and the second toe right foot on palpation.  No tenderness to bilateral feet or ankles with range of motion. Calf is soft/non-tender without warmth/induration    Imaging:       No images are attached to the encounter or orders placed in the encounter.     No results found.   No results found.       Dago Montenegro DPM  [facility] Foot & Ankle Surgery/Podiatry         Again, thank you for allowing me to participate in the care of your patient.        Sincerely,        Dago Chaney DPM

## 2021-09-02 NOTE — PROGRESS NOTES
FOOT AND ANKLE SURGERY/PODIATRY CONSULT NOTE         ASSESSMENT:   Cavus foot deformity bilaterally  Hammertoe second toe right foot  Metatarsalgia bilateral feet      TREATMENT:  I have recommended orthotics.  An x-ray of the right foot was taken today.  I have also recommended hammertoe correction second toe right foot to correct the painful hammertoe deformity.  The patient was informed that this procedure would be done on an outpatient basis under local anesthesia with IV sedation.  She was told the procedure takes approximately 10 minutes to perform.  She would then be discharged weightbearing in a postop shoe for 3 weeks.  The patient was asked to go n.p.o. at midnight prior to the procedure and she was asked to see her primary care physician for preoperative consultation.  All pertinent questions were invited and answered.        HPI: I was asked to see Divina Goyal today to evaluate and treat bilateral foot pain.  The patient stated that she has had severe pain in the ball of her feet for the past 2 months.  It is an achy type pain which is aggravated with prolonged weightbearing and ambulation.  She denies any trauma to her feet.  She has not had any associated redness or swelling.  The pain is relieved only with nonweightbearing.  She denies any previous treatment.  Is not had any associated redness or swelling.  The patient also complained of a painful hammertoe second toe right foot.  She stated that all her shoes aggravate this particular toe.  It is quite irritating.  She has not had any redness or swelling involving the second toe right foot.  Pain is only relieved after removing her shoe gear.  The patient was seen in consultation at the request of Bee Buchanan CNM for evaluation and treatment of bilateral foot pain.     Past Medical History:   Diagnosis Date     Abnormal Pap smear of cervix      Chickenpox      Headache      HPV (human papilloma virus) infection      Hypothyroidism       "Perineal laceration during delivery 11/01/2019    second degree     Preeclampsia     delivered 11/1/19     Seasonal allergies      Teratoma of ovary 04/10/2019    bilateral     Teratoma of ovary 04/09/2019    Bilateral, surgically removed during pregnancy     Thyroid disease      UTI (urinary tract infection)        Social History     Socioeconomic History     Marital status:      Spouse name: Derik     Number of children: 2     Years of education: Not on file     Highest education level: Not on file   Occupational History     Not on file   Tobacco Use     Smoking status: Former Smoker     Packs/day: 0.00     Smokeless tobacco: Never Used     Tobacco comment: quit at age 19   Substance and Sexual Activity     Alcohol use: Not Currently     Drug use: Never     Comment: Drug use: Denies     Sexual activity: Yes     Partners: Male   Other Topics Concern     Not on file   Social History Narrative    . Has 4 month old son \"Cody\".  Works part time at [employer].     Social Determinants of Health     Financial Resource Strain:      Difficulty of Paying Living Expenses:    Food Insecurity:      Worried About Running Out of Food in the Last Year:      Ran Out of Food in the Last Year:    Transportation Needs:      Lack of Transportation (Medical):      Lack of Transportation (Non-Medical):    Physical Activity:      Days of Exercise per Week:      Minutes of Exercise per Session:    Stress:      Feeling of Stress :    Social Connections:      Frequency of Communication with Friends and Family:      Frequency of Social Gatherings with Friends and Family:      Attends Yazidism Services:      Active Member of Clubs or Organizations:      Attends Club or Organization Meetings:      Marital Status:    Intimate Partner Violence:      Fear of Current or Ex-Partner:      Emotionally Abused:      Physically Abused:      Sexually Abused:         No Known Allergies       Current Outpatient Medications:      APNO CREA " "ointment, Apply 31 g topically as needed (for nipple soreness) Apply to nipples sparingly after each feeding. Do not wash or wipe off. (Patient not taking: Reported on 8/19/2021), Disp: 30.6 g, Rfl: 1     Ferrous Gluconate (IRON 27 PO), , Disp: , Rfl:      ibuprofen (ADVIL/MOTRIN) 800 MG tablet, Take 800 mg by mouth, Disp: , Rfl:      levothyroxine (SYNTHROID/LEVOTHROID) 75 MCG tablet, Take 75 mcg by mouth daily, Disp: , Rfl:      Prenatal Vit-Fe Fumarate-FA (PRENATAL VITAMIN) 27-0.8 MG TABS, Take 1 tablet by mouth, Disp: , Rfl:      Family History   Problem Relation Age of Onset     Alcoholism Mother      Substance Abuse Mother      Early Death Mother      Mental Illness Mother      Cerebrovascular Disease Mother      Alcoholism Father      Arthritis Father      Hypertension Father      Alcoholism Brother      Substance Abuse Maternal Grandmother      Substance Abuse Maternal Grandfather      Cancer Maternal Grandfather      Hypertension Paternal Grandfather      Arthritis Paternal Aunt      Substance Abuse Paternal Uncle         Social History     Socioeconomic History     Marital status:      Spouse name: Derik     Number of children: 2     Years of education: Not on file     Highest education level: Not on file   Occupational History     Not on file   Tobacco Use     Smoking status: Former Smoker     Packs/day: 0.00     Smokeless tobacco: Never Used     Tobacco comment: quit at age 19   Substance and Sexual Activity     Alcohol use: Not Currently     Drug use: Never     Comment: Drug use: Denies     Sexual activity: Yes     Partners: Male   Other Topics Concern     Not on file   Social History Narrative    . Has 4 month old son \"Cody\".  Works part time at [employer].     Social Determinants of Health     Financial Resource Strain:      Difficulty of Paying Living Expenses:    Food Insecurity:      Worried About Running Out of Food in the Last Year:      Ran Out of Food in the Last Year:  "   Transportation Needs:      Lack of Transportation (Medical):      Lack of Transportation (Non-Medical):    Physical Activity:      Days of Exercise per Week:      Minutes of Exercise per Session:    Stress:      Feeling of Stress :    Social Connections:      Frequency of Communication with Friends and Family:      Frequency of Social Gatherings with Friends and Family:      Attends Christianity Services:      Active Member of Clubs or Organizations:      Attends Club or Organization Meetings:      Marital Status:    Intimate Partner Violence:      Fear of Current or Ex-Partner:      Emotionally Abused:      Physically Abused:      Sexually Abused:         Review of Systems - Patient denies fever, chills, rash, wound, stiffness, limping, numbness, weakness, heart burn, blood in stool, chest pain with activity, calf pain when walking, shortness of breath with activity, chronic cough, easy bleeding/bruising, swelling of ankles, excessive thirst, fatigue, depression, anxiety.  Patient admits to bilateral foot pain and a painful hammertoe second toe right foot.      OBJECTIVE:  Appearance: alert, well appearing, and in no distress.    There were no vitals taken for this visit.     There is no height or weight on file to calculate BMI.     General appearance: Patient is alert and fully cooperative with history & exam.  No sign of distress is noted during the visit.  Psychiatric: Affect is pleasant & appropriate.  Patient appears motivated to improve health.  Respiratory: Breathing is regular & unlabored while sitting.  HEENT: Hearing is intact to spoken word.  Speech is clear.  No gross evidence of visual impairment that would impact ambulation.    Vascular: Dorsalis pedis and posterior tibial pulses are palpable. There is pedal hair growth bilaterally.  CFT < 3 sec from anterior tibial surface to distal digits bilaterally. There is no appreciable edema noted.  Dermatologic: Turgor and texture are within normal limits. No  coloration or temperature changes. No primary or secondary lesions noted.  Neurologic: All epicritic and proprioceptive sensations are grossly intact bilaterally.  Musculoskeletal: All active and passive ankle, subtalar, midtarsal, and 1st MPJ range of motion are grossly intact without pain or crepitus, with the exception of none. Manual muscle strength is within normal limits bilaterally. All dorsiflexors, plantarflexors, invertors, evertors are intact bilaterally. Tenderness present to subsecond third and fourth metatarsal heads bilaterally and the second toe right foot on palpation.  No tenderness to bilateral feet or ankles with range of motion. Calf is soft/non-tender without warmth/induration    Imaging:       No images are attached to the encounter or orders placed in the encounter.     No results found.   No results found.       Dago Montenegro DPM  [facility] Foot & Ankle Surgery/Podiatry

## 2021-09-02 NOTE — PATIENT INSTRUCTIONS
What are Prescription Custom Orthotics?  Custom orthotics are specially-made devices designed to support and comfort your feet. Prescription orthotics are crafted for you and no one else. They match the contours of your feet precisely and are designed for the way you move. Orthotics are only manufactured after a podiatrist has conducted a complete evaluation of your feet, ankles, and legs, so the orthotic can accommodate your unique foot structure and pathology.  Prescription orthotics are divided into two categories:    Functional orthotics are designed to control abnormal motion. They may be used to treat foot pain caused by abnormal motion; they can also be used to treat injuries such as shin splints or tendinitis. Functional orthotics are usually crafted of a semi-rigid material such as plastic or graphite.    Accommodative orthotics are softer and meant to provide additional cushioning and support. They can be used to treat diabetic foot ulcers, painful calluses on the bottom of the foot, and other uncomfortable conditions.  Podiatrists use orthotics to treat foot problems such as plantar fasciitis, bursitis, tendinitis, diabetic foot ulcers, and foot, ankle, and heel pain. Clinical research studies have shown that podiatrist-prescribed foot orthotics decrease foot pain and improve function.  Orthotics typically cost more than shoe inserts purchased in a retail store, but the additional cost is usually well worth it. Unlike shoe inserts, orthotics are molded to fit each individual foot, so you can be sure that your orthotics fit and do what they're supposed to do. Prescription orthotics are also made of top-notch materials and last many years when cared for properly. Insurance often helps pay for prescription orthotics.  What are Shoe Inserts?   You've seen them at the grocery store and at the mall. You've probably even seen them on TV and online. Shoe inserts are any kind of non-prescription foot support  designed to be worn inside a shoe. Pre-packaged, mass produced, arch supports are shoe inserts. So are the  custom-made  insoles and foot supports that you can order online or at retail stores. Unless the device has been prescribed by a doctor and crafted for your specific foot, it's a shoe insert, not a custom orthotic device--despite what the ads might say.  Shoe inserts can be very helpful for a variety of foot ailments, including flat arches and foot and leg pain. They can cushion your feet, provide comfort, and support your arches, but they can't correct biomechanical foot problems or cure long-standing foot issues.  The most common types of shoe inserts are:    Arch supports: Some people have high arches. Others have low arches or flat feet. Arch supports generally have a  bumped-up  appearance and are designed to support the foot's natural arch.     Insoles: Insoles slip into your shoe to provide extra cushioning and support. Insoles are often made of gel, foam, or plastic.     Heel liners: Heel liners, sometimes called heel pads or heel cups, provide extra cushioning in the heel region. They may be especially useful for patients who have foot pain caused by age-related thinning of the heels' natural fat pads.     Foot cushions: Do your shoes rub against your heel or your toes? Foot cushions come in many different shapes and sizes and can be used as a barrier between you and your shoe.  Choosing an Over-the-Counter Shoe Insert  Selecting a shoe insert from the wide variety of devices on the market can be overwhelming. Here are some podiatrist-tested tips to help you find the insert that best meets your needs:    Consider your health. Do you have diabetes? Problems with circulation? An over-the-counter insert may not be your best bet. Diabetes and poor circulation increase your risk of foot ulcers and infections, so schedule an appointment with a podiatrist. He or she can help you select a solution that won't  cause additional health problems.     Think about the purpose. Are you planning to run a marathon, or do you just need a little arch support in your work shoes? Look for a product that fits your planned level of activity.     Bring your shoes. For the insert to be effective, it has to fit into your shoes. So bring your sneakers, dress shoes, or work boots--whatever you plan to wear with your insert. Look for an insert that will fit the contours of your shoe.     Try them on. If all possible, slip the insert into your shoe and try it out. Walk around a little. How does it feel? Don't assume that feelings of pressure will go away with continued wear. (If you can't try the inserts at the store, ask about the store's return policy and hold on to your receipt.)    Please call one of the [facility] locations below to schedule an appointment. If you received a prescription please bring it with you to your appointment. Some locations are limited to what they carry.    Office Locations    [facility]  [address] [place] [address]  Home Medical Equipment, [address]   Phone: [phone]   Orthotics and Prosthetics, [address]   Phone: [phone]    [facility]  Home Medical Equipment  [address] [address]   Phone: [phone]    Orthotics and Prosthetics ([facility])    [address], [address], [place] [address]  Phone: [phone]    [facility]  [address]   [place] [address]   Phone: [phone]    [facility]  [address]  [place] [address]  Phone: [phone]    [facility]   [address]  [place] [address]  Phone: [phone]    [facility]  [address]  [place] [address]  Phone: [phone]    [facility]  [address]  [address]  [place] [address]  Phone: [phone]    [place]   [address] [place] [address]   Phone: [phone]  Surgery Information    **ALL [facility] PAPERWORK IS TO BE FAXED -[phone], IT WILL BE PROCESSED AFTER SURGERY IS COMPLETE.      Surgery Date     Surgery Time   ( Arrive at the hospital two hours prior to your surgery and 1 hour prior at the surgery center. Check in at the . The only exception is if you are scheduled for surgery at 7am, you should arrive at 5:30am)    IF YOU NEED TO RESCHEDULE OR CANCEL YOUR SURGERY FOR ANY REASON PLEASE CALL THE CLINIC AS SOON AS POSSIBLE -[phone].    Admission Type: Outpatient    Surgeon: Dr. Chaney    Surgery Procedure: Hammertoe 2nd Right     Surgery Location: [facility], [address] [place] [address], [address]    If you take Blood Thinners Such as:  Warfarin, Xarelto, Plavix, Aspirin or Eliquis this will need to be HELD prior to procedure according to primary care provider/doctor or cardiology who prescribes this medication. Generally this is 5 days.    Additional Information: If you use a CPAP machine for sleep apnea please bring it with you for surgery. We will want to monitor your breathing using your normal equipment.   If you need to reach the surgery scheduler please call the main number at [phone] and ask for Barby for Dr. Chaney    [facility] INFORMATION    We need to know a lot of information about you before surgery.     1-5 Days Before:     A nurse will call you for a pre-screening interview. The phone call with the nurse will be much faster and easier if you  Have organized your information prior to the call.     Please have the following information available:    Preoperative Exam completed and faxed to our office    Primary care provider's and any specialty providers' contact  information available. .    If requested by your primary care provider, have any heart and lung exams at least 3 days before surgery.    Have a complete and accurate list of medications available.     Have a list of your allergies/sensitivities and reactions    Know your health history, surgical history, and medical problems    Know any anesthesia issues with you or within your family.     BE SURE TO NOTIFY US IF YOU ARE TAKING ANY BLOOD THINNING AGENTS: Coumadin, Plavix, Aspirin, Xarelto, Eliquis    Someone planned to bring you and stay with you after the surgery    Day Before Surgery    1. STOP Smoking and Drinking: It is important to stop smoking and drinking at least 24 hours before surgery. Smoking and drinking may cause complications in your recovery from anesthesia and may lengthen the healing process.    2. Pack for your hospital stay: If it will be required for you to stay at the hospital after surgery, bring personal items such as a robe, slippers, pajamas, additional clothing and toiletries. Don't forget:    List of medication    Eyeglass case or contact case with solution. You cannot wear contacts during surgery    Copies of your physical exam , lab results and EKG    Copy of Health Care Directives, Living Will or Power of     Insurance Cards    Photo ID    CPAP machine    3. NOTHING BY MOUTH 8 HOURS BEFORE. This includes gum, hard candy, water and mints. The only exception is if you have been instructed by your doctor to take your medications with sips of water. You may rinse your mouth or brush your teeth, but do not swallow water.     4. Remove Nail Polish  Day of Surgery    1. Medications- Take as indicated with sips of water     2. Wear comfortable loose fitting clothes. Wear your glasses-Not contacts. Do not wear jewelry and remove body piercing's. Surgery may be cancelled if they are not removed.     3. If same day surgery-Have a someone come with you to surgery that can help you  understand the surgeon's instructions, drive you home and stay with you overnight the first night.     4. A nurse will call you at home within 72 hours following surgery to see how you are doing. Our nurses and doctors will discuss recovery with you.      The surgery scheduler Barby for Dr. Chaney will contact you to schedule if you were not scheduled today or you need to make any adjustments to your surgery.     You will need a preop physical within 30 days before surgery with your primary care provider. Please note if you do not get a complete History and Physical your surgery will be cancelled.   Please contact your primary to schedule.     A nurse should contact you from the hospital 1-2 days before surgery to review with you.    If you would like a Good [place] Estimate for your upcoming service/procedure contact Cost of Care Estimates at [phone], advocates are available Monday through Friday 8am - 5pm. You may also submit a request online at [email or web address]    [address]  [address]     [phone] for facility charge    Anesthesiology charge  [phone]          Please don't hesitate to call us with any additional question or problems  Our number is [phone]     Instructions for Patients Scheduled for Vascular or Podiatry Procedures During the COVID 19 Pandemic    Your Provider has determined that your condition warrants going ahead with your procedure at this time.  You will need to be tested for COVID19 within 72 hours of your procedure. We highly encourage patients to get tested for COVID-19 at one of our designated [facility] testing sites. We process the tests in our lab, which allows us to get the results quickly. If you choose to get tested at a non-[facility] location, you will need to contact your primary care provider to make those arrangements and ensure the  results are available to your surgeon before you are arriving for your procedure. If we do not receive the results in time, your procedure may be postponed or canceled.  Please make sure your test is collected 3-days prior to your procedure date.  The results will need to get faxed to [phone].  After testing, you will need to remain in self-quarantine until your procedure.  If you are notified of a positive COVID-19 result; you will need to call your provider for further recommendations.    Please call [phone] and press option 2 to speak to a nurse.  If the test is positive; DO NOT PRESENT FOR YOUR PROCEDURE until you have been given further instructions.  You will not be called with negative results so arrive as instructed unless otherwise notified.  Don't:    Don't invite visitors or friends into your home.    Don't leave your home unless absolutely necessary.    Don't share utensils and other household items with others in the home.  Do:    Wash your hands regularly with soap and water and use hand  with at least 60% alcohol if you don't have easy access to soap and water.     Disinfect surface areas daily, including doorknobs, electronics - especially phones, laptops and other devices.     Wash utensils and other items thoroughly.     Separate yourself from others in the household as best you can, including pets.    Keep your hands away from your face.     Practice all other prevention tips the CDC recommends, including covering your coughs and sneezes with a tissue or your sleeve and immediately throwing the tissue into the trash and washing your hands.    Call your hospital if you develop the following signs before your surgery:    Fever.    Cough.    Shortness of breath.    Sore throat.    Runny or stuffy nose.    Muscle or body aches.    Headaches.    Fatigue.    Vomiting and diarrhea.    These steps will help keep you & your family, other patients, and hospital staff safe from COVID19.

## 2021-09-20 ENCOUNTER — THERAPY VISIT (OUTPATIENT)
Dept: PHYSICAL THERAPY | Facility: CLINIC | Age: 36
End: 2021-09-20
Attending: ADVANCED PRACTICE MIDWIFE
Payer: COMMERCIAL

## 2021-09-20 DIAGNOSIS — N81.89 PELVIC FLOOR WEAKNESS IN FEMALE: ICD-10-CM

## 2021-09-20 DIAGNOSIS — M62.89 PELVIC FLOOR DYSFUNCTION: ICD-10-CM

## 2021-09-20 PROCEDURE — 97530 THERAPEUTIC ACTIVITIES: CPT | Mod: GP | Performed by: PHYSICAL THERAPIST

## 2021-09-20 PROCEDURE — 97110 THERAPEUTIC EXERCISES: CPT | Mod: GP | Performed by: PHYSICAL THERAPIST

## 2021-09-20 NOTE — PROGRESS NOTES
Physical Therapy Initial Evaluation  Subjective:  The history is provided by the patient. No  was used.   Patient Health History  Divina MORALES See being seen for pelvic floor, child birth.     Date of Onset: 21 date of order,  21.      Pain is reported as 0/10 on pain scale.  General health as reported by patient is fair.  Pertinent medical history includes: none.   Red flags:  None as reported by patient.      Other surgery history details: appendix.    Current medications:  Thyroid medication.    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   Primary job tasks include:  Lifting/carrying, pushing/pulling and repetitive tasks.                   Therapist Assessment:   Clinical Impression: Pt presents with primary complaint of mixed incontinence.  Per clinical examination, pt with overactive PFM.  Pt will benefit from skilled physical therapy for down training, education on bladder health, and instruction on urge suppression techniques.    Chief Complaint:  The pt presents today s/p  on 21. The pt has been noticing less control with urges to urinate. The pt has been playing sand volleyball and getting into working out more regularly. The pt notes leaking of urine 1-2x a month. Her first pregnancy in 2019 she had open surgery to remove tumors on her ovaries, and two weeks postpartum she had a laparoscopic surgery due to torsion of ovaries. The pt presents today after the delivery of her second child.    The pt has been seeing a chiropractor a few times for her back pain and she was a few strength exercises for her core that has been helping.    Her  is getting a vasectomy and they have not had intercourse yet.     Current activity: weight training, and cardio, core strengthening    Goals: improve awareness/coordination of PFM, no leaking    Urination   Do you leak on the way to the bathroom or with a strong urge to void? Sometimes, will get a strong urge (no leaking) about every day  Do  you leak with cough, sneeze, jumping, running? sometimes  Any other activities that cause leaking? n/a  Do you have triggers that make you feel you can't wait to go to the bathroom? yes What are they? Water running  Type of pad and number used per day? none  When you leak what is the amount? medium  How long can you delay the need to urinate? 30 seconds  How many times do you get up to urinate at night? 0  How many times do you urinate during the day? 5-6x during the day  Can you stop the flow of urine when on the toilet? average  Is the volume of urine passed usually: average  Do you strain to pass urine? no  Do you have a slow or hesitant urinary stream? no  Do you have difficulty initiating the urine stream? no  How many bladder infections have you had in the last 12 months? 1  What is you fluid intake per day? Water (8oz) lots  Caffeine 2 cups  Alcohol 0-1  Do you feel like you empty completely when voiding? yes    Bowel Habits  Frequency of bowel movements? 4-5x a week  Consistency of stool? Dimmit stool scale? Type 4  Do you ignore the urge to defecate? no  Do you strain to pass stool? no  Do you feel that you empty completely? No, since having children    Pelvic Pain  When do you have pelvic pain? no  Are you sexually active? no  Have you given birth? 2 vaginal deliveries  Have you ever been worried for your physical safety? no  Any abdominal or pelvic surgeries? yes  Are you having regular exercise? yes  Have you practiced the PF (kegel) exercise for 4 or more weeks? no    Objective:    POSTURE: forward head, rounded shoulders    FLEXIBILITY:tight adductors and piriformis bilaterally      EXTERNAL ASSESSMENT:  Skin condition:normal  Bearing down/coughing:slight bulge  Muscle contraction/perineal mobility: elevation and urogenital triangle descent     INTERNAL VAGINAL ASSESSMENT:  Baseline PF tone:hyper  PF Tone with cough: hypo   PF Response quality: normal  PF Power: Center: 4/5  Accessory Muscle  use:occasional glutes  Endurance: Maximum contraction in seconds:10 seconds   Quick contraction repetitions prior to fatigue: not tested  Palpation: tenderness to palpation of: levator ani, STP, bulbocavernosus, and ischiocavernosus bilaterally      Assessment/Plan:    Patient is a 36 year old female with cervical complaints.    Patient has the following significant findings with corresponding treatment plan.                Diagnosis 1:  Pelvic floor overacitivity  Pain -  hot/cold therapy, US, electric stimulation, mechanical traction, manual therapy, STS, splint/taping/bracing/orthotics, self management, education, directional preference exercise and home program  Decreased ROM/flexibility - manual therapy, therapeutic exercise, therapeutic activity and home program  Impaired muscle performance - biofeedback, electric stimulation, neuro re-education and home program  Impaired posture - neuro re-education, therapeutic activities and home program    Therapy Evaluation Codes:   Cumulative Therapy Evaluation is: Low complexity.    Previous and current functional limitations:  (See Goal Flow Sheet for this information)    Short term and Long term goals: (See Goal Flow Sheet for this information)     Communication ability:  Patient appears to be able to clearly communicate and understand verbal and written communication and follow directions correctly.  Treatment Explanation - The following has been discussed with the patient:   RX ordered/plan of care  Anticipated outcomes  Possible risks and side effects  This patient would benefit from PT intervention to resume normal activities.   Rehab potential is good.    Frequency:  1 X week, once daily  Duration:  for 4 weeks tapering to 2 X a month over 8 weeks  Discharge Plan:  Achieve all LTG.  Independent in home treatment program.  Reach maximal therapeutic benefit.    Please refer to the daily flowsheet for treatment today, total treatment time and time spent performing  1:1 timed codes.

## 2021-09-22 ENCOUNTER — TELEPHONE (OUTPATIENT)
Dept: PODIATRY | Facility: CLINIC | Age: 36
End: 2021-09-22

## 2021-09-22 NOTE — TELEPHONE ENCOUNTER
Several attempts have been made to contact patient to schedule surgery with Dr. Chaney but no answer and voicemail is full. [facility] message also sent but not read.

## 2021-10-04 ENCOUNTER — THERAPY VISIT (OUTPATIENT)
Dept: PHYSICAL THERAPY | Facility: CLINIC | Age: 36
End: 2021-10-04
Payer: COMMERCIAL

## 2021-10-04 DIAGNOSIS — M62.89 PELVIC FLOOR DYSFUNCTION: ICD-10-CM

## 2021-10-04 PROCEDURE — 97110 THERAPEUTIC EXERCISES: CPT | Mod: GP | Performed by: PHYSICAL THERAPIST

## 2021-10-04 PROCEDURE — 97530 THERAPEUTIC ACTIVITIES: CPT | Mod: GP | Performed by: PHYSICAL THERAPIST

## 2021-10-10 ENCOUNTER — HEALTH MAINTENANCE LETTER (OUTPATIENT)
Age: 36
End: 2021-10-10

## 2021-10-11 ENCOUNTER — TELEPHONE (OUTPATIENT)
Dept: ENDOCRINOLOGY | Facility: CLINIC | Age: 36
End: 2021-10-11

## 2021-10-11 DIAGNOSIS — E03.9 ACQUIRED HYPOTHYROIDISM: Primary | ICD-10-CM

## 2021-10-11 RX ORDER — LEVOTHYROXINE SODIUM 75 UG/1
TABLET ORAL
Qty: 90 TABLET | Refills: 0 | Status: SHIPPED | OUTPATIENT
Start: 2021-10-11 | End: 2022-01-18

## 2021-10-11 NOTE — TELEPHONE ENCOUNTER
Patient called. Please authorize Levothyroxine refill asap.     She would appreciate a call once Rx has been approved. She is having difficulty with the pharmacy calling her when meds are ready for .    Sandy Murcia @ [phone]

## 2021-11-30 NOTE — PROGRESS NOTES
"Subjective: Divina Goyal is a 36 year old female who presents for birth control consultation. Current contraception method: withdrawal.Sexually transmitted infection risk: very low. LMP unsure as she has had some spotting on occasion since birth in July.  Breastfeeding yes, but weaning. Has used COCP, withdrawal, Paraguard, condoms for contraception in the past. Pertinent past medical history: elevated BP with first pregnancy, but normalized PP.  They do not think condoms are a good option for them.  Her  plans a vasectomy after the new year.  She reports that she was on COCP in 2016 and called it the \"crazy pill\" as it made her have mood swings and she did not like that.  Does not want anything long acting.  Reviewed options of POP, COCP, IUDs, Nexlpanon, Diaphragm, condoms, with risks, benefits, side effects, MOA, length of use, timing, etc.    No LMP recorded. (Menstrual status: Postpartum).      The following portions of the patient's history were reviewed and updated as appropriate: allergies, current medications, past family history, past medical history, past social history, past surgical history and problem list.    Review of Systems  Pertinent items are noted in HPI.      Objective:   Exam:  Constitutional: healthy, alert and no distress  Cardiovascular: appears well perfused  Respiratory: breathing and speaking with normal effort  Neurologic: normal gait, CN II-XII intact  Psychiatric: mentation appears normal and affect normal/bright        Assessment:   36 year old, starting Nuva ring, no contraindications.     Birth control consult    Plan:     -After discussion of methods, would like to use Nuvaring.   -Written and verbal information given on this method. ACES (OCPs), PAINS (IUDs) warning signs reviewed.   -Labs none; will take pregnancy test at home prior to starting  -Rx written for Nuvaring x 3, refills 3.   -RTC PRN    25 minutes on the date of the encounter doing chart review, review of " test results, patient visit and documentation     DANELLE LuceroM

## 2021-12-01 ENCOUNTER — OFFICE VISIT (OUTPATIENT)
Dept: MIDWIFE SERVICES | Facility: CLINIC | Age: 36
End: 2021-12-01
Payer: COMMERCIAL

## 2021-12-01 VITALS
HEART RATE: 84 BPM | WEIGHT: 241.8 LBS | HEIGHT: 69 IN | SYSTOLIC BLOOD PRESSURE: 114 MMHG | BODY MASS INDEX: 35.81 KG/M2 | DIASTOLIC BLOOD PRESSURE: 84 MMHG

## 2021-12-01 DIAGNOSIS — Z30.09 ENCOUNTER FOR COUNSELING REGARDING CONTRACEPTION: Primary | ICD-10-CM

## 2021-12-01 PROCEDURE — 99213 OFFICE O/P EST LOW 20 MIN: CPT | Performed by: ADVANCED PRACTICE MIDWIFE

## 2021-12-01 RX ORDER — ETONOGESTREL AND ETHINYL ESTRADIOL VAGINAL RING .015; .12 MG/D; MG/D
1 RING VAGINAL
Qty: 3 EACH | Refills: 3 | Status: SHIPPED | OUTPATIENT
Start: 2021-12-01 | End: 2022-09-06

## 2021-12-01 ASSESSMENT — MIFFLIN-ST. JEOR: SCORE: 1856.18

## 2021-12-07 ENCOUNTER — LAB (OUTPATIENT)
Dept: LAB | Facility: CLINIC | Age: 36
End: 2021-12-07
Payer: COMMERCIAL

## 2021-12-07 DIAGNOSIS — E03.9 ACQUIRED HYPOTHYROIDISM: ICD-10-CM

## 2021-12-07 LAB
T4 FREE SERPL-MCNC: 0.48 NG/DL (ref 0.7–1.8)
TSH SERPL DL<=0.005 MIU/L-ACNC: 52.3 UIU/ML (ref 0.3–5)

## 2021-12-07 PROCEDURE — 36415 COLL VENOUS BLD VENIPUNCTURE: CPT

## 2021-12-07 PROCEDURE — 84439 ASSAY OF FREE THYROXINE: CPT

## 2021-12-07 PROCEDURE — 84443 ASSAY THYROID STIM HORMONE: CPT

## 2021-12-23 ENCOUNTER — DOCUMENTATION ONLY (OUTPATIENT)
Dept: LAB | Facility: CLINIC | Age: 36
End: 2021-12-23
Payer: COMMERCIAL

## 2021-12-23 DIAGNOSIS — E03.9 ACQUIRED HYPOTHYROIDISM: Primary | ICD-10-CM

## 2021-12-23 NOTE — PROGRESS NOTES
Patient has an upcoming appointment for Teresa's labs.  No orders are in the chart.    Please place orders or advise pt.    Thanks

## 2022-01-13 NOTE — PROGRESS NOTES
Divina is a 36 year old who is being evaluated via a billable video visit.      How would you like to obtain your AVS? MyChart  If the video visit is dropped, the invitation should be resent by: Text to cell phone: [phone]  Will anyone else be joining your video visit? No      Video Start Time: 0800    [facility] ENDOCRINOLOGY    Thyroid Note  1/25/2022    Divina Goyal, 1985, [id number]          Reason for visit      1. Hypothyroidism, unspecified type        HPI     Divina Goyal is a very pleasant 36 year old old female who presents for follow up.  SUMMARY:    Divina is contacted today via Video Visit in f/u for Hypothyroidism. She is postpartum about 6 months. She was found to have a very elevated TSH and a low fT4.  (52.3 and 0.48, respectively). She has made a concerted effort to take her Levothyroxine, 75 mcg medication consistently, 6 days a week. Her TSH improved dramatically to 25.3 and fT4 of  0.85.  She is feeling symptomatic, specifically fatigue, inability to lose weight and feeling extra cold.     Past Medical History     Patient Active Problem List   Diagnosis     Teratoma of ovary     Hypothyroidism     Hidradenitis suppurativa     Cervical high risk HPV (human papillomavirus) test positive     Atypical squamous cells of undetermined significance (ASCUS) on Papanicolaou smear of cervix     Dermoid cyst of both ovaries     Pap smear for cervical cancer screening     Lactating mother     Environmental allergies     Hammer toe     Encounter for other contraceptive management     Pelvic floor dysfunction       Family History       family history includes Alcoholism in her brother, father, and mother; Arthritis in her father and paternal aunt; Cancer in her maternal grandfather; Cerebrovascular Disease in her mother; Early Death in her mother; Hypertension in her father and paternal grandfather; Mental Illness in her mother; Substance Abuse in her maternal grandfather, maternal  grandmother, mother, and paternal uncle.    Social History      reports that she has quit smoking. She smoked 0.00 packs per day. She has never used smokeless tobacco. She reports current alcohol use. She reports that she does not use drugs.      Review of Systems     Patient denies fatigue, weight changes, heat/cold intolerance, bowel/skin changes or CVS symptoms.   Remainder per HPI and per attached intake form.      Vital Signs     There were no vitals taken for this visit.  Wt Readings from Last 3 Encounters:   12/01/21 109.7 kg (241 lb 12.8 oz)   09/02/21 105.7 kg (233 lb)   08/19/21 105.7 kg (233 lb)       Physical Exam     Constitutional:  Well developed, Well nourished  HENT:  Normocephalic,   Neck: normal in appearance  Eyes:  PERRL, Conjunctiva pink  Respiratory:  No respiratory distress  Skin: No acanthosis nigricans, lipoatrophy or lipodystrophy  Neurologic:  Alert & oriented x 3, nonfocal  Psychiatric:  Affect, Mood, Insight appropriate          Assessment     1. Hypothyroidism, unspecified type            Plan     We will increase her Levothyroxine dose to 88 mcg and see if we can get her back into normal range. F/u labs in 2 months and with me in 3 months if needed.        Susy Carranza NP  [facility]  1/25/2022  9:13 AM      Lab Results     TSH   Date Value Ref Range Status   01/20/2022 25.30 (H) 0.30 - 5.00 uIU/mL Final     T3 Free   Date Value Ref Range Status   10/10/2019 3.1 1.9 - 3.9 pg/mL Final     No components found for: THYROIDAB    No results found for: T7YSOGU    Imaging Results   Last thyroid ultrasound:  No results found for this or any previous visit.      Last thyroid nuclear scan:  No results found for this or any previous visit.      Current Medications     Outpatient Medications Prior to Visit   Medication Sig Dispense Refill     clindamycin (CLEOCIN T) 1 % external lotion APPLY TO AFFECTED AREAS ON GROIN ONCE DAILY.       etonogestrel-ethinyl estradiol (NUVARING) 0.12-0.015  MG/24HR vaginal ring Place 1 each vaginally every 28 days 3 each 3     levothyroxine (SYNTHROID/LEVOTHROID) 75 MCG tablet One tablet p.o. six days per week. 90 tablet 0     metroNIDAZOLE (METROCREAM) 0.75 % external cream APPLY TO AFFECTED AREA ON FACE, GROIN, AND ARMPITS DAILY ALTERNATE WITH CLINDAMYCIN       SULFACLEANSE 8/4 8-4 % SUSP USE TO WASH AFFECTED AREAS ONCE DAILY IN THE SHOWER.LATHER & LET SIT FOR 3 TO 5 MINUTES BEFORE RINSING.       Prenatal Vit-Fe Fumarate-FA (PRENATAL VITAMIN) 27-0.8 MG TABS Take 1 tablet by mouth       No facility-administered medications prior to visit.         Video-Visit Details    Type of service:  Video Visit    Video End Time: 0820    Originating Location (pt. Location): Home    [facility]     Platform used for Video Visit: Doximity    Date of last OV: 12/01/20  Reason for Visit: Hypothyroidism

## 2022-01-17 DIAGNOSIS — E03.9 ACQUIRED HYPOTHYROIDISM: ICD-10-CM

## 2022-01-17 NOTE — TELEPHONE ENCOUNTER
Reason for Call:  Medication or medication refill:    Do you use a [facility]?  Name of the pharmacy and phone number for the current request:  [id number] - [place], [place] - [address] [facility] [address]    Name of the medication requested: levothyroxine (SYNTHROID/LEVOTHROID) 75 MCG tablet    Other request: NO    Can we leave a detailed message on this number? YES    Phone number patient can be reached at: Home number on file [phone] (home)    Best Time: ANY    Call taken on 1/17/2022 at 4:46 PM by Amparo Lopez

## 2022-01-18 VITALS
HEART RATE: 84 BPM | WEIGHT: 256 LBS | SYSTOLIC BLOOD PRESSURE: 122 MMHG | BODY MASS INDEX: 37.92 KG/M2 | HEIGHT: 69 IN | DIASTOLIC BLOOD PRESSURE: 64 MMHG

## 2022-01-18 VITALS
HEIGHT: 69 IN | HEIGHT: 69 IN | DIASTOLIC BLOOD PRESSURE: 66 MMHG | BODY MASS INDEX: 34.1 KG/M2 | WEIGHT: 224 LBS | SYSTOLIC BLOOD PRESSURE: 100 MMHG | DIASTOLIC BLOOD PRESSURE: 66 MMHG | SYSTOLIC BLOOD PRESSURE: 122 MMHG | HEART RATE: 72 BPM | BODY MASS INDEX: 33.18 KG/M2 | WEIGHT: 230.2 LBS | OXYGEN SATURATION: 99 % | TEMPERATURE: 98.4 F | HEART RATE: 64 BPM

## 2022-01-18 VITALS
WEIGHT: 260 LBS | SYSTOLIC BLOOD PRESSURE: 110 MMHG | DIASTOLIC BLOOD PRESSURE: 64 MMHG | BODY MASS INDEX: 38.51 KG/M2 | HEART RATE: 88 BPM | HEIGHT: 69 IN

## 2022-01-18 VITALS
BODY MASS INDEX: 33.21 KG/M2 | WEIGHT: 224.2 LBS | SYSTOLIC BLOOD PRESSURE: 104 MMHG | HEART RATE: 70 BPM | DIASTOLIC BLOOD PRESSURE: 70 MMHG | HEIGHT: 69 IN

## 2022-01-18 VITALS
HEART RATE: 88 BPM | SYSTOLIC BLOOD PRESSURE: 116 MMHG | WEIGHT: 265 LBS | HEIGHT: 69 IN | DIASTOLIC BLOOD PRESSURE: 72 MMHG | BODY MASS INDEX: 39.25 KG/M2

## 2022-01-18 VITALS
WEIGHT: 263 LBS | DIASTOLIC BLOOD PRESSURE: 60 MMHG | HEART RATE: 84 BPM | SYSTOLIC BLOOD PRESSURE: 122 MMHG | HEIGHT: 69 IN | BODY MASS INDEX: 38.95 KG/M2

## 2022-01-18 VITALS
SYSTOLIC BLOOD PRESSURE: 128 MMHG | DIASTOLIC BLOOD PRESSURE: 68 MMHG | HEART RATE: 87 BPM | BODY MASS INDEX: 37.07 KG/M2 | OXYGEN SATURATION: 99 % | WEIGHT: 251 LBS

## 2022-01-18 VITALS
DIASTOLIC BLOOD PRESSURE: 64 MMHG | WEIGHT: 215 LBS | SYSTOLIC BLOOD PRESSURE: 110 MMHG | BODY MASS INDEX: 31.84 KG/M2 | HEIGHT: 69 IN | HEART RATE: 72 BPM

## 2022-01-18 VITALS
HEART RATE: 76 BPM | BODY MASS INDEX: 37.92 KG/M2 | DIASTOLIC BLOOD PRESSURE: 56 MMHG | SYSTOLIC BLOOD PRESSURE: 114 MMHG | HEIGHT: 69 IN | WEIGHT: 256 LBS

## 2022-01-18 RX ORDER — LEVOTHYROXINE SODIUM 75 UG/1
TABLET ORAL
Qty: 90 TABLET | Refills: 0 | Status: SHIPPED | OUTPATIENT
Start: 2022-01-18 | End: 2022-03-16

## 2022-01-20 ENCOUNTER — LAB (OUTPATIENT)
Dept: LAB | Facility: CLINIC | Age: 37
End: 2022-01-20
Payer: COMMERCIAL

## 2022-01-20 DIAGNOSIS — E03.9 ACQUIRED HYPOTHYROIDISM: ICD-10-CM

## 2022-01-20 LAB
T4 FREE SERPL-MCNC: 0.85 NG/DL (ref 0.7–1.8)
TSH SERPL DL<=0.005 MIU/L-ACNC: 25.3 UIU/ML (ref 0.3–5)

## 2022-01-20 PROCEDURE — 84439 ASSAY OF FREE THYROXINE: CPT

## 2022-01-20 PROCEDURE — 36415 COLL VENOUS BLD VENIPUNCTURE: CPT

## 2022-01-20 PROCEDURE — 84443 ASSAY THYROID STIM HORMONE: CPT

## 2022-01-24 ENCOUNTER — TELEPHONE (OUTPATIENT)
Dept: MIDWIFE SERVICES | Facility: CLINIC | Age: 37
End: 2022-01-24
Payer: COMMERCIAL

## 2022-01-24 NOTE — PROGRESS NOTES
I spoke to Divina today. She let her know that she has 3 refills that were ordered, and that she should be good through 3/1/2022. Advised her to contact her pharmacy to get refill. Verified pharmacy on file with her as well.

## 2022-01-25 ENCOUNTER — VIRTUAL VISIT (OUTPATIENT)
Dept: ENDOCRINOLOGY | Facility: CLINIC | Age: 37
End: 2022-01-25
Payer: COMMERCIAL

## 2022-01-25 DIAGNOSIS — E03.9 HYPOTHYROIDISM, UNSPECIFIED TYPE: Primary | ICD-10-CM

## 2022-01-25 PROCEDURE — 99214 OFFICE O/P EST MOD 30 MIN: CPT | Mod: 95 | Performed by: NURSE PRACTITIONER

## 2022-01-25 RX ORDER — CLINDAMYCIN PHOSPHATE 10 UG/ML
LOTION TOPICAL
COMMUNITY
Start: 2021-10-04

## 2022-01-25 RX ORDER — LEVOTHYROXINE SODIUM 88 UG/1
88 TABLET ORAL DAILY
Qty: 90 TABLET | Refills: 3 | Status: SHIPPED | OUTPATIENT
Start: 2022-01-25 | End: 2022-03-16

## 2022-01-25 RX ORDER — SODIUM SULFACETAMIDE AND SULFUR 80; 40 MG/ML; MG/ML
LOTION TOPICAL
COMMUNITY
Start: 2021-10-04

## 2022-03-08 ENCOUNTER — LAB (OUTPATIENT)
Dept: LAB | Facility: CLINIC | Age: 37
End: 2022-03-08
Payer: COMMERCIAL

## 2022-03-08 DIAGNOSIS — E03.9 HYPOTHYROIDISM, UNSPECIFIED TYPE: ICD-10-CM

## 2022-03-08 LAB
T4 FREE SERPL-MCNC: 0.92 NG/DL (ref 0.7–1.8)
THYROPEROXIDASE AB SERPL-ACNC: 1960 IU/ML (ref 0–6)
TSH SERPL DL<=0.005 MIU/L-ACNC: 9.64 UIU/ML (ref 0.3–5)

## 2022-03-08 PROCEDURE — 86800 THYROGLOBULIN ANTIBODY: CPT | Mod: 90

## 2022-03-08 PROCEDURE — 84439 ASSAY OF FREE THYROXINE: CPT

## 2022-03-08 PROCEDURE — 86376 MICROSOMAL ANTIBODY EACH: CPT

## 2022-03-08 PROCEDURE — 84443 ASSAY THYROID STIM HORMONE: CPT

## 2022-03-08 PROCEDURE — 36415 COLL VENOUS BLD VENIPUNCTURE: CPT

## 2022-03-08 PROCEDURE — 99000 SPECIMEN HANDLING OFFICE-LAB: CPT

## 2022-03-08 PROCEDURE — 84432 ASSAY OF THYROGLOBULIN: CPT | Mod: 90

## 2022-03-14 ENCOUNTER — TELEPHONE (OUTPATIENT)
Dept: ENDOCRINOLOGY | Facility: CLINIC | Age: 37
End: 2022-03-14
Payer: COMMERCIAL

## 2022-03-14 NOTE — TELEPHONE ENCOUNTER
Labs 3/8/22     Results for MARTA EUCEDA (MRN [id number]) as of 3/14/2022 09:05   Ref. Range 3/8/2022 11:05   T4 Free Latest Ref Range: 0.70 - 1.80 ng/dL 0.92   TSH Latest Ref Range: 0.30 - 5.00 uIU/mL 9.64 (H)   Thyroid Peroxidase Antibody Latest Ref Range: 0 - 6 IU/mL 1,960 (H)     Thyroglobulin still in process.

## 2022-03-14 NOTE — TELEPHONE ENCOUNTER
[facility]    Phone Message    May a detailed message be left on voicemail: yes     Reason for Call: Other: Patient calling in for Thyroid Lab results  Done on 3/8/2021    Action Taken: Other: ENDO    Travel Screening: Not Applicable

## 2022-03-16 DIAGNOSIS — E03.9 HYPOTHYROIDISM, UNSPECIFIED TYPE: Primary | ICD-10-CM

## 2022-03-16 RX ORDER — LEVOTHYROXINE SODIUM 100 UG/1
100 TABLET ORAL DAILY
Qty: 90 TABLET | Refills: 3 | Status: SHIPPED | OUTPATIENT
Start: 2022-03-16 | End: 2022-04-26

## 2022-03-16 RX ORDER — ONDANSETRON 4 MG/1
TABLET, FILM COATED ORAL
COMMUNITY
Start: 2022-01-27 | End: 2023-12-04

## 2022-03-16 RX ORDER — DICYCLOMINE HYDROCHLORIDE 10 MG/1
CAPSULE ORAL
COMMUNITY
Start: 2022-01-27 | End: 2023-12-04

## 2022-03-16 NOTE — TELEPHONE ENCOUNTER
[facility]    Phone Message    May a detailed message be left on voicemail: yes     Reason for Call: Medication Question or concern regarding medication   Prescription Clarification  Name of Medication: levothyroxine 100MCG  Prescribing Provider: Dillon     Pharmacy: [id number] - [place], [place] - [address] AT [facility] [address]     What on the order needs clarification? Pt stated Teresa had told her the med dose needed to increase, but the pharmacy has not received an Rx.  Please send to pharmacy.              Action Taken: Message routed to:  Other: endo    Travel Screening: Not Applicable

## 2022-03-31 LAB — SCANNED LAB RESULT: ABNORMAL

## 2022-04-04 ENCOUNTER — TELEPHONE (OUTPATIENT)
Dept: ENDOCRINOLOGY | Facility: CLINIC | Age: 37
End: 2022-04-04
Payer: COMMERCIAL

## 2022-04-04 NOTE — TELEPHONE ENCOUNTER
Attempted to call patient to discuss, as labs were relayed in [employer] message on 3/9/22.     Patient picked up and hung up. Will try again later.

## 2022-04-04 NOTE — TELEPHONE ENCOUNTER
[facility]    Phone Message    May a detailed message be left on voicemail: yes     Reason for Call: Other: Pt called in stating she had some problems with her phone and is requesting a call back at this time.      Action Taken: Other: Endo    Travel Screening: Not Applicable

## 2022-04-04 NOTE — TELEPHONE ENCOUNTER
Sendout Thyroglobulin and Antibody (Sendout to [facility])    came in on 3/31/22:      Please advise.

## 2022-04-04 NOTE — TELEPHONE ENCOUNTER
[facility]    Phone Message    May a detailed message be left on voicemail: yes     Reason for Call: Requesting Results   Name/type of test: lab  Date of test: 03/8/2022  Was test done at a location other than [facility] (Please fill in the location if not [facility])?: No    Patient received a PDF with results but can not read them and is requesting a return call to discuss thank you.       Action Taken: Message routed to:  [facility] endo    Travel Screening: Not Applicable

## 2022-04-06 NOTE — TELEPHONE ENCOUNTER
Informed patient of below.     Patient concerned because no one reached out to her to confirm diagnosis or the labs. Did inform patient that providers have a week to respond as they are receiving multiple results from patients.     Patient states she has no idea of the disorder or what is next steps. Did inform patient that it Susy barton did mention in [employer] communication in regards to the autoimmune thyroid disorder. Patient states she was not told it was. And that she would be informed when the last result returned. Patient states no one has been communicating or following up with her. Did inform patient adjustments were made and sent in per [employer] communication. Next steps and further detailed discuss would happen at visit on 4/26/22.

## 2022-04-06 NOTE — TELEPHONE ENCOUNTER
LMTCB to discuss per khushbu that the test  is another test for antibodies, for which she is positive.  We know that she has Autoimmune Thyroid disorder.

## 2022-04-15 NOTE — PROGRESS NOTES
Divina is a 37 year old who is being evaluated via a billable video visit.      How would you like to obtain your AVS? MyChart  If the video visit is dropped, the invitation should be resent by: Text to cell phone: [phone]  Will anyone else be joining your video visit? No      Video Start Time: 1000    [facility] ENDOCRINOLOGY    Thyroid Note  4/26/2022    Divina Goyal, 1985, [id number]          Reason for visit      1. Hypothyroidism due to Hashimoto's thyroiditis        HPI     Divina Goyal is a very pleasant 37 year old old female who presents for follow up.  SUMMARY:    Divina is contacted today via Video Visit in f/u for Hashimoto's hypothyroidism. She reports that she is feeling  better with the last increase to her dose, but still has some room for improvement. She is currently taking 100 mcg of Levothyroxine daily. Her TSH has gone from 9.64 to 7.81.  fT4 is 1.09.  She is having no problems referable to her neck at present.     Past Medical History     Patient Active Problem List   Diagnosis     Teratoma of ovary     Hypothyroidism     Hidradenitis suppurativa     Cervical high risk HPV (human papillomavirus) test positive     Atypical squamous cells of undetermined significance (ASCUS) on Papanicolaou smear of cervix     Dermoid cyst of both ovaries     Pap smear for cervical cancer screening     Lactating mother     Environmental allergies     Hammer toe     Encounter for other contraceptive management     Pelvic floor dysfunction       Family History       family history includes Alcoholism in her brother, father, and mother; Arthritis in her father and paternal aunt; Cancer in her maternal grandfather; Cerebrovascular Disease in her mother; Early Death in her mother; Hypertension in her father and paternal grandfather; Mental Illness in her mother; Substance Abuse in her maternal grandfather, maternal grandmother, mother, and paternal uncle.    Social History      reports that she has  quit smoking. She smoked 0.00 packs per day. She has never used smokeless tobacco. She reports current alcohol use. She reports that she does not use drugs.      Review of Systems     Patient denies fatigue, weight changes, heat/cold intolerance, bowel/skin changes or CVS symptoms.   Remainder per HPI and per attached intake form.      Vital Signs     There were no vitals taken for this visit.  Wt Readings from Last 3 Encounters:   12/01/21 109.7 kg (241 lb 12.8 oz)   09/02/21 105.7 kg (233 lb)   08/19/21 105.7 kg (233 lb)       Physical Exam     Constitutional:  Well developed, Well nourished  HENT:  Normocephalic,   Neck: normal in appearance  Eyes:  PERRL, Conjunctiva pink  Respiratory:  No respiratory distress  Skin: No acanthosis nigricans, lipoatrophy or lipodystrophy  Neurologic:  Alert & oriented x 3, nonfocal  Psychiatric:  Affect, Mood, Insight appropriate          Assessment     1. Hypothyroidism due to Hashimoto's thyroiditis            Plan     Will increase her Levothyroxine dose to 125 mcg daily and get another set of labs done in 6 weeks. Information provided on Hashimoto's Hypothyroidism through the [facility].  She will f/u with me in 6 months.         Susy Carranza NP  [facility]  4/26/2022  10:59 AM        Lab Results     TSH   Date Value Ref Range Status   04/19/2022 7.81 (H) 0.30 - 5.00 uIU/mL Final     T3 Free   Date Value Ref Range Status   10/10/2019 3.1 1.9 - 3.9 pg/mL Final     No components found for: THYROIDAB    No results found for: E7WTQSF    Imaging Results   Last thyroid ultrasound:  No results found for this or any previous visit.      Last thyroid nuclear scan:  No results found for this or any previous visit.      Current Medications     Outpatient Medications Prior to Visit   Medication Sig Dispense Refill     clindamycin (CLEOCIN T) 1 % external lotion APPLY TO AFFECTED AREAS ON GROIN ONCE DAILY.       dicyclomine (BENTYL) 10 MG capsule Take 1 capsule (10 mg) by  mouth 4 times per day       etonogestrel-ethinyl estradiol (NUVARING) 0.12-0.015 MG/24HR vaginal ring Place 1 each vaginally every 28 days 3 each 3     levothyroxine (SYNTHROID/LEVOTHROID) 100 MCG tablet Take 1 tablet (100 mcg) by mouth daily 90 tablet 3     metroNIDAZOLE (METROCREAM) 0.75 % external cream APPLY TO AFFECTED AREA ON FACE, GROIN, AND ARMPITS DAILY ALTERNATE WITH CLINDAMYCIN       ondansetron (ZOFRAN) 4 MG tablet TAKE 1-2 TABLETS BY MOUTH EVERY 6 HOURS AS NEEDED FOR NAUSEA       SULFACLEANSE 8/4 8-4 % SUSP USE TO WASH AFFECTED AREAS ONCE DAILY IN THE SHOWER.LATHER & LET SIT FOR 3 TO 5 MINUTES BEFORE RINSING.       No facility-administered medications prior to visit.         Video-Visit Details    Type of service:  Video Visit    Video End Time: 1020    Originating Location (pt. Location): Home    Distant Location (provider location):  [facility]     Platform used for Video Visit: Kami    Date of last OV: 1/25/22  Reason for Visit: Hypothyroidism

## 2022-04-19 ENCOUNTER — LAB (OUTPATIENT)
Dept: LAB | Facility: CLINIC | Age: 37
End: 2022-04-19
Payer: COMMERCIAL

## 2022-04-19 DIAGNOSIS — E03.9 HYPOTHYROIDISM, UNSPECIFIED TYPE: ICD-10-CM

## 2022-04-19 LAB
T4 FREE SERPL-MCNC: 1.09 NG/DL (ref 0.7–1.8)
TSH SERPL DL<=0.005 MIU/L-ACNC: 7.81 UIU/ML (ref 0.3–5)

## 2022-04-19 PROCEDURE — 84443 ASSAY THYROID STIM HORMONE: CPT

## 2022-04-19 PROCEDURE — 84439 ASSAY OF FREE THYROXINE: CPT

## 2022-04-19 PROCEDURE — 36415 COLL VENOUS BLD VENIPUNCTURE: CPT

## 2022-04-26 ENCOUNTER — VIRTUAL VISIT (OUTPATIENT)
Dept: ENDOCRINOLOGY | Facility: CLINIC | Age: 37
End: 2022-04-26
Payer: COMMERCIAL

## 2022-04-26 DIAGNOSIS — E06.3 HYPOTHYROIDISM DUE TO HASHIMOTO'S THYROIDITIS: Primary | ICD-10-CM

## 2022-04-26 PROCEDURE — 99213 OFFICE O/P EST LOW 20 MIN: CPT | Mod: 95 | Performed by: NURSE PRACTITIONER

## 2022-04-26 RX ORDER — LEVOTHYROXINE SODIUM 125 UG/1
TABLET ORAL
Qty: 90 TABLET | Refills: 3 | Status: SHIPPED | OUTPATIENT
Start: 2022-04-26 | End: 2022-12-29 | Stop reason: DRUGHIGH

## 2022-06-08 ENCOUNTER — LAB (OUTPATIENT)
Dept: LAB | Facility: CLINIC | Age: 37
End: 2022-06-08
Payer: COMMERCIAL

## 2022-06-08 DIAGNOSIS — E06.3 HYPOTHYROIDISM DUE TO HASHIMOTO'S THYROIDITIS: ICD-10-CM

## 2022-06-08 LAB
T4 FREE SERPL-MCNC: 1.14 NG/DL (ref 0.7–1.8)
TSH SERPL DL<=0.005 MIU/L-ACNC: 2.24 UIU/ML (ref 0.3–5)

## 2022-06-08 PROCEDURE — 84439 ASSAY OF FREE THYROXINE: CPT

## 2022-06-08 PROCEDURE — 36415 COLL VENOUS BLD VENIPUNCTURE: CPT

## 2022-06-08 PROCEDURE — 84443 ASSAY THYROID STIM HORMONE: CPT

## 2022-09-06 ENCOUNTER — TELEPHONE (OUTPATIENT)
Dept: MIDWIFE SERVICES | Facility: CLINIC | Age: 37
End: 2022-09-06

## 2022-09-06 DIAGNOSIS — Z30.09 ENCOUNTER FOR COUNSELING REGARDING CONTRACEPTION: ICD-10-CM

## 2022-09-06 RX ORDER — ETONOGESTREL AND ETHINYL ESTRADIOL VAGINAL RING .015; .12 MG/D; MG/D
1 RING VAGINAL
Qty: 3 EACH | Refills: 4 | Status: SHIPPED | OUTPATIENT
Start: 2022-09-06 | End: 2022-12-05

## 2022-09-06 RX ORDER — ETONOGESTREL AND ETHINYL ESTRADIOL VAGINAL RING .015; .12 MG/D; MG/D
1 RING VAGINAL
Qty: 3 EACH | Refills: 0 | Status: SHIPPED | OUTPATIENT
Start: 2022-09-06 | End: 2022-09-06

## 2022-09-06 NOTE — TELEPHONE ENCOUNTER
Pt is calling she is a week past running out of birth control. She states the pharmacy has tried to contact us for refill but they have heard nothing. She is wondering if a refill for her birth control can be called in?

## 2022-09-06 NOTE — TELEPHONE ENCOUNTER
Pt called and is confused on when she needs to be seen and also needs to clarify which pharmacy the rx for 3 Nuva Rings can be called in to.

## 2022-09-18 ENCOUNTER — HEALTH MAINTENANCE LETTER (OUTPATIENT)
Age: 37
End: 2022-09-18

## 2022-12-05 ENCOUNTER — TELEPHONE (OUTPATIENT)
Dept: MIDWIFE SERVICES | Facility: CLINIC | Age: 37
End: 2022-12-05

## 2022-12-05 DIAGNOSIS — Z30.09 ENCOUNTER FOR COUNSELING REGARDING CONTRACEPTION: ICD-10-CM

## 2022-12-05 RX ORDER — ETONOGESTREL AND ETHINYL ESTRADIOL VAGINAL RING .015; .12 MG/D; MG/D
1 RING VAGINAL
Qty: 3 EACH | Refills: 0 | Status: SHIPPED | OUTPATIENT
Start: 2022-12-05 | End: 2022-12-29

## 2022-12-05 NOTE — TELEPHONE ENCOUNTER
Pending Prescriptions:                       Disp   Refills    etonogestrel-ethinyl estradiol (NUVARING)*3 each 0            Sig: Place 1 each vaginally every 28 days    Last visit with REYES was 12/01/2021.  Next appointment scheduled on 12/29/22.

## 2022-12-05 NOTE — TELEPHONE ENCOUNTER
Pt is scheduled for her Physical but will run out of birth control before then. Can we please call in RX for Nuva Ring to hold her over till appointment. Please call pt once that is called in so she knows she is able to go .

## 2022-12-29 ENCOUNTER — OFFICE VISIT (OUTPATIENT)
Dept: MIDWIFE SERVICES | Facility: CLINIC | Age: 37
End: 2022-12-29
Payer: COMMERCIAL

## 2022-12-29 VITALS
WEIGHT: 216 LBS | HEIGHT: 68 IN | DIASTOLIC BLOOD PRESSURE: 62 MMHG | SYSTOLIC BLOOD PRESSURE: 108 MMHG | HEART RATE: 72 BPM | BODY MASS INDEX: 32.74 KG/M2

## 2022-12-29 DIAGNOSIS — F41.9 ANXIETY: ICD-10-CM

## 2022-12-29 DIAGNOSIS — Z30.8 ENCOUNTER FOR OTHER CONTRACEPTIVE MANAGEMENT: ICD-10-CM

## 2022-12-29 DIAGNOSIS — Z30.09 ENCOUNTER FOR COUNSELING REGARDING CONTRACEPTION: ICD-10-CM

## 2022-12-29 DIAGNOSIS — F32.A DEPRESSION, UNSPECIFIED DEPRESSION TYPE: ICD-10-CM

## 2022-12-29 DIAGNOSIS — Z01.419 WELL WOMAN EXAM: Primary | ICD-10-CM

## 2022-12-29 DIAGNOSIS — E03.9 HYPOTHYROIDISM, UNSPECIFIED TYPE: ICD-10-CM

## 2022-12-29 LAB
T4 FREE SERPL-MCNC: 1.66 NG/DL (ref 0.9–1.7)
TSH SERPL DL<=0.005 MIU/L-ACNC: 0.58 UIU/ML (ref 0.3–4.2)

## 2022-12-29 PROCEDURE — 36415 COLL VENOUS BLD VENIPUNCTURE: CPT | Performed by: ADVANCED PRACTICE MIDWIFE

## 2022-12-29 PROCEDURE — 84443 ASSAY THYROID STIM HORMONE: CPT | Performed by: ADVANCED PRACTICE MIDWIFE

## 2022-12-29 PROCEDURE — 99395 PREV VISIT EST AGE 18-39: CPT | Performed by: ADVANCED PRACTICE MIDWIFE

## 2022-12-29 PROCEDURE — 84439 ASSAY OF FREE THYROXINE: CPT | Performed by: ADVANCED PRACTICE MIDWIFE

## 2022-12-29 RX ORDER — ETONOGESTREL AND ETHINYL ESTRADIOL VAGINAL RING .015; .12 MG/D; MG/D
1 RING VAGINAL
Qty: 3 EACH | Refills: 3 | Status: SHIPPED | OUTPATIENT
Start: 2022-12-29 | End: 2023-12-04

## 2022-12-29 RX ORDER — LEVOTHYROXINE SODIUM 137 UG/1
TABLET ORAL
COMMUNITY
Start: 2022-10-04 | End: 2023-11-30

## 2022-12-29 NOTE — PROGRESS NOTES
"Assessment:     1. Well person exam  2. Hypothyroidism on medication therapy  3. Contraceptive management, NuvaRing  4. Anxiety  5. Depression     Plan:      1. Discussed nutrition and exercise.  Advised MVI, Vitamin D3 4000IU geltab and an omega 3 supplement daily   2. Blood tests: TSH, free T4.  FASTING lipid profile declined by patient at this time.  3. Breast self exam technique reviewed and patient encouraged to perform self-exam monthly.   4. Contraception: Nuvaring,  had recent vasectomy, due for semen analysis in March 2023.  NuvaRing has been refilled for 1 year. ACHES reviewed.  5.  Next pap due 7/22/2025. HPV co-testing discussed with that pap, then paps q 5 yrs if both negative.  6. Referral given for psychotherapy  7. RTC 1 year for annual physical exam, PRN    Subjective:     Divina Goyal is a 37 year old female who presents for an annual exam. She endorses feeling physically well overall. Has experienced some significantly life stressors in the month of December related to her children becoming eligible for  suddenly and having to decide whether to start  with little notice and her not having a job secured yet. 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 Describes her emotional response to the changes as leaning on \"self-destructive\" habits including poor nighttime eating habits and \"watching trashy TV\" when she experiences insomnia. Reports that she has experienced panic attacks recently as a result of financial conversations with her .     LG 7: 16/21, \"very difficult\"  PHQ 9: 18/27, \"somewhat difficult\"      Healthy Habits:   Regular Exercise: Had been exercising regularly, recently disrupted d/t family circumstances  Sunscreen Use: Yes  Healthy Diet: unsatisfied with diet currently, disrupted d/t family circumstances  Dental Visits Regularly: " "Yes  Seat Belt: Yes  Sexually active: Yes   STI risk No  domestic violence No    Self Breast Exam Monthly:No  Colonoscopy: No  Lipid Profile: defer to next physical  Glucose Screen: No        Immunization History   Administered Date(s) Administered     COVID-19 Vaccine 12+ (Pfizer) 2021, 2021, 2021     Influenza Vaccine >6 months (Alfuria,Fluzone) 2019, 10/21/2021     Tdap (Adacel,Boostrix) 2008, 2018, 2019, 2021     Immunization status: Covid booster & influenza available.    Gynecologic History  Patient's last menstrual period was 2022 (approximate).  Contraception: Nuva ring and vasectomy--current partner  Endorses no concerns with sexual relationship, reports recent \"positive changes\" in terms of desire and experience      Cancer screening:   Last Pap: 2020. Results were: Normal  Last mammogram: NA      OB History    Para Term  AB Living   2 2 2 0 0 2   SAB IAB Ectopic Multiple Live Births   0 0 0 0 2      # Outcome Date GA Lbr Loyd/2nd Weight Sex Delivery Anes PTL Lv   2 Term 21 40w6d 06:59 / 00:19 4.14 kg (9 lb 2 oz) M Vag-Spont Local, EPI N LUIS ANGEL      Complications: Chorioamnionitis      Name: Shadi      Apgar1: 7  Apgar5: 9   1 Term 19 39w4d / 01:25 3.232 kg (7 lb 2 oz) M Vag-Spont EPI, Nitrous, IV N LUIS ANGEL      Name: Cody      Apgar1: 9  Apgar5: 9       Current Outpatient Medications   Medication Sig Dispense Refill     clindamycin (CLEOCIN T) 1 % external lotion APPLY TO AFFECTED AREAS ON GROIN ONCE DAILY.       etonogestrel-ethinyl estradiol (NUVARING) 0.12-0.015 MG/24HR vaginal ring Place 1 each vaginally every 28 days 3 each 3     levothyroxine (SYNTHROID/LEVOTHROID) 137 MCG tablet        SULFACLEANSE  8-4 % SUSP USE TO WASH AFFECTED AREAS ONCE DAILY IN THE SHOWER.LATHER & LET SIT FOR 3 TO 5 MINUTES BEFORE RINSING.       dicyclomine (BENTYL) 10 MG capsule Take 1 capsule (10 mg) by mouth 4 times per day (Patient not " taking: Reported on 12/29/2022)       metroNIDAZOLE (METROCREAM) 0.75 % external cream APPLY TO AFFECTED AREA ON FACE, GROIN, AND ARMPITS DAILY ALTERNATE WITH CLINDAMYCIN (Patient not taking: Reported on 12/29/2022)       ondansetron (ZOFRAN) 4 MG tablet TAKE 1-2 TABLETS BY MOUTH EVERY 6 HOURS AS NEEDED FOR NAUSEA (Patient not taking: Reported on 12/29/2022)       Past Medical History:   Diagnosis Date     Abnormal Pap smear of cervix      Chickenpox      Headache      HPV (human papilloma virus) infection      Hypothyroidism      Perineal laceration during delivery 11/01/2019    second degree     Preeclampsia     delivered 11/1/19     Seasonal allergies      Teratoma of ovary 04/10/2019    bilateral     Teratoma of ovary 04/09/2019    Bilateral, surgically removed during pregnancy     Thyroid disease      UTI (urinary tract infection)      Past Surgical History:   Procedure Laterality Date     APPENDECTOMY       APPENDECTOMY      as a child     GYN SURGERY       MOUTH SURGERY       MT HYSTEROSCOPY,W/ENDO BX N/A 11/15/2019    Procedure: HYSTEROSCOPY, ULTRASOUND GUIDED DILATION AND CURETTAGE;  Surgeon: Alessandro Barber MD;  Location: [facility];  Service: Gynecology     MT LAP,FULGURATE/EXCISE LESIONS N/A 11/15/2019    Procedure: LAPAROSCOPY, REMOVAL OF LEFT OVARIAN CYSTECTOMY, HYSTEROSCOPY , WITH ULTRASOUND;  Surgeon: Alessandro Barber MD;  Location: [facility];  Service: Gynecology     TERATOMA EXCISION Bilateral 04/09/2019    Ovaries     Patient has no known allergies.  Family History   Problem Relation Age of Onset     Alcoholism Mother      Substance Abuse Mother      Early Death Mother      Mental Illness Mother      Cerebrovascular Disease Mother      Alcoholism Father      Arthritis Father      Hypertension Father      Alcoholism Brother      Substance Abuse Maternal Grandmother      Substance Abuse Maternal Grandfather      Cancer Maternal Grandfather      Hypertension Paternal Grandfather   "    Arthritis Paternal Aunt      Substance Abuse Paternal Uncle      Social History     Socioeconomic History     Marital status:      Spouse name: Derik     Number of children: 2     Years of education: Not on file     Highest education level: Not on file   Occupational History     Not on file   Tobacco Use     Smoking status: Former     Packs/day: 0.00     Types: Cigarettes     Smokeless tobacco: Never     Tobacco comments:     quit at age 19   Vaping Use     Vaping Use: Never used   Substance and Sexual Activity     Alcohol use: Yes     Drug use: Never     Comment: Drug use: Denies     Sexual activity: Yes     Partners: Male   Other Topics Concern     Not on file   Social History Narrative    . Has 4 month old son \"Cody\".  Works part time at [employer].     Social Determinants of Health     Financial Resource Strain: Not on file   Food Insecurity: Not on file   Transportation Needs: Not on file   Physical Activity: Not on file   Stress: Not on file   Social Connections: Not on file   Intimate Partner Violence: Not on file   Housing Stability: Not on file       Review of Systems  General: insomnia r/t life stressors  Eyes: Denies problems, believes due for exam  Ears/Nose/Throat: Denies problem  Cardiovascular: Denies problem  Respiratory:  Denies problem  Gastrointestinal:  denies problems  Genitourinary: denies problems  Musculoskeletal:  Denies problem  Skin: Denies problem  Neurologic:headaches  Psychiatric: anxiety & depression symptoms  Endocrine: fatigue        Objective:      Vitals:    12/29/22 1229   BP: 108/62   Pulse: 72   Weight: 98 kg (216 lb)   Height: 1.727 m (5' 8\")       Physical Exam:  General Appearance: Alert, cooperative, appears stated age. Tearful when discussing life stressors  Skin: Skin color, texture, turgor normal, no rashes or lesions  Throat: Lips, mucosa, and tongue normal; teeth and gums normal  INÉS: grossly normal; otoscopic and opthalmic exam not performed. "   Neck: Supple, symmetrical, trachea midline, no adenopathy;  thyroid: not enlarged, symmetric, no tenderness/mass/nodules  Lungs: Clear to auscultation bilaterally, respirations unlabored  Breasts: No breast masses, tenderness, asymmetry, or nipple discharge.  Heart: Regular rate and rhythm, S1 and S2 normal, no murmur  Abdomen: Soft, non-tender. No organomegaly or masses  Pelvic: deferred    Patient was seen with student, TESS Barriga who was present for learning. I personally assessed, examined and made clinical decisions reflected in the documentation.

## 2022-12-30 ASSESSMENT — ANXIETY QUESTIONNAIRES
1. FEELING NERVOUS, ANXIOUS, OR ON EDGE: MORE THAN HALF THE DAYS
3. WORRYING TOO MUCH ABOUT DIFFERENT THINGS: NEARLY EVERY DAY
7. FEELING AFRAID AS IF SOMETHING AWFUL MIGHT HAPPEN: NEARLY EVERY DAY
GAD7 TOTAL SCORE: 16
GAD7 TOTAL SCORE: 16
6. BECOMING EASILY ANNOYED OR IRRITABLE: MORE THAN HALF THE DAYS
5. BEING SO RESTLESS THAT IT IS HARD TO SIT STILL: MORE THAN HALF THE DAYS
2. NOT BEING ABLE TO STOP OR CONTROL WORRYING: NEARLY EVERY DAY
IF YOU CHECKED OFF ANY PROBLEMS ON THIS QUESTIONNAIRE, HOW DIFFICULT HAVE THESE PROBLEMS MADE IT FOR YOU TO DO YOUR WORK, TAKE CARE OF THINGS AT HOME, OR GET ALONG WITH OTHER PEOPLE: VERY DIFFICULT

## 2022-12-30 ASSESSMENT — PATIENT HEALTH QUESTIONNAIRE - PHQ9
SUM OF ALL RESPONSES TO PHQ QUESTIONS 1-9: 18
5. POOR APPETITE OR OVEREATING: SEVERAL DAYS

## 2023-06-02 ENCOUNTER — OFFICE VISIT (OUTPATIENT)
Dept: MIDWIFE SERVICES | Facility: CLINIC | Age: 38
End: 2023-06-02
Payer: COMMERCIAL

## 2023-06-02 VITALS
HEART RATE: 74 BPM | HEIGHT: 69 IN | WEIGHT: 220 LBS | DIASTOLIC BLOOD PRESSURE: 70 MMHG | SYSTOLIC BLOOD PRESSURE: 118 MMHG | BODY MASS INDEX: 32.58 KG/M2

## 2023-06-02 DIAGNOSIS — F32.A DEPRESSION, UNSPECIFIED DEPRESSION TYPE: Primary | ICD-10-CM

## 2023-06-02 DIAGNOSIS — N94.6 DYSMENORRHEA: ICD-10-CM

## 2023-06-02 DIAGNOSIS — G47.00 INSOMNIA, UNSPECIFIED TYPE: ICD-10-CM

## 2023-06-02 DIAGNOSIS — F41.9 ANXIETY: ICD-10-CM

## 2023-06-02 LAB
ALBUMIN SERPL BCG-MCNC: 4.2 G/DL (ref 3.5–5.2)
ALP SERPL-CCNC: 67 U/L (ref 35–104)
ALT SERPL W P-5'-P-CCNC: 30 U/L (ref 10–35)
ANION GAP SERPL CALCULATED.3IONS-SCNC: 9 MMOL/L (ref 7–15)
AST SERPL W P-5'-P-CCNC: 27 U/L (ref 10–35)
BILIRUB SERPL-MCNC: 0.5 MG/DL
BUN SERPL-MCNC: 11.4 MG/DL (ref 6–20)
CALCIUM SERPL-MCNC: 9.4 MG/DL (ref 8.6–10)
CHLORIDE SERPL-SCNC: 103 MMOL/L (ref 98–107)
CREAT SERPL-MCNC: 0.87 MG/DL (ref 0.51–0.95)
DEPRECATED HCO3 PLAS-SCNC: 25 MMOL/L (ref 22–29)
GFR SERPL CREATININE-BSD FRML MDRD: 88 ML/MIN/1.73M2
GLUCOSE SERPL-MCNC: 105 MG/DL (ref 70–99)
POTASSIUM SERPL-SCNC: 4.4 MMOL/L (ref 3.4–5.3)
PROT SERPL-MCNC: 7.1 G/DL (ref 6.4–8.3)
SODIUM SERPL-SCNC: 137 MMOL/L (ref 136–145)

## 2023-06-02 PROCEDURE — 36415 COLL VENOUS BLD VENIPUNCTURE: CPT | Performed by: ADVANCED PRACTICE MIDWIFE

## 2023-06-02 PROCEDURE — 80053 COMPREHEN METABOLIC PANEL: CPT | Performed by: ADVANCED PRACTICE MIDWIFE

## 2023-06-02 PROCEDURE — 99214 OFFICE O/P EST MOD 30 MIN: CPT | Performed by: ADVANCED PRACTICE MIDWIFE

## 2023-06-02 RX ORDER — TRETINOIN 0.5 MG/G
CREAM TOPICAL
COMMUNITY
Start: 2023-03-10

## 2023-06-02 RX ORDER — IBUPROFEN 600 MG/1
600 TABLET, FILM COATED ORAL EVERY 6 HOURS PRN
Qty: 30 TABLET | Refills: 1 | Status: SHIPPED | OUTPATIENT
Start: 2023-06-02

## 2023-06-02 RX ORDER — ESCITALOPRAM OXALATE 10 MG/1
10 TABLET ORAL DAILY
Qty: 30 TABLET | Refills: 1 | Status: SHIPPED | OUTPATIENT
Start: 2023-06-02 | End: 2023-07-07

## 2023-06-02 ASSESSMENT — ANXIETY QUESTIONNAIRES
5. BEING SO RESTLESS THAT IT IS HARD TO SIT STILL: MORE THAN HALF THE DAYS
IF YOU CHECKED OFF ANY PROBLEMS ON THIS QUESTIONNAIRE, HOW DIFFICULT HAVE THESE PROBLEMS MADE IT FOR YOU TO DO YOUR WORK, TAKE CARE OF THINGS AT HOME, OR GET ALONG WITH OTHER PEOPLE: VERY DIFFICULT
GAD7 TOTAL SCORE: 19
1. FEELING NERVOUS, ANXIOUS, OR ON EDGE: NEARLY EVERY DAY
7. FEELING AFRAID AS IF SOMETHING AWFUL MIGHT HAPPEN: NEARLY EVERY DAY
3. WORRYING TOO MUCH ABOUT DIFFERENT THINGS: MORE THAN HALF THE DAYS
6. BECOMING EASILY ANNOYED OR IRRITABLE: NEARLY EVERY DAY
GAD7 TOTAL SCORE: 19
2. NOT BEING ABLE TO STOP OR CONTROL WORRYING: NEARLY EVERY DAY

## 2023-06-02 ASSESSMENT — PATIENT HEALTH QUESTIONNAIRE - PHQ9
5. POOR APPETITE OR OVEREATING: NEARLY EVERY DAY
SUM OF ALL RESPONSES TO PHQ QUESTIONS 1-9: 22

## 2023-06-02 NOTE — PROGRESS NOTES
"Assessment:   1.  MDD  2.  LG  3.  History of childhood trauma  4.  Insomnia  5.  Contraceptive management-vasectomy  6.  Dysmenorrhea    Plan:      1. Discussed nutrition and exercise.    2. Blood tests: Comprehensive metabolic panel   3.  Reviewed the benefits and risks of an antidepressant/anxiolytic medication.  Patient is agreeable to initiate Lexapro 10 mg: Half tablet p.o. nightly x8 days, then increase to 1 tablet p.o. daily, #30, 1 refill.  Continue counseling/therapy weekly as scheduled.  May use Unisom as directed for insomnia.  4. Contraception: Vasectomy  5.  Next pap due July 2025. HPV co-testing discussed with that pap, then paps q 5 yrs if both negative.  6.  In the setting of dysmenorrhea, patient may resume the use of NuvaRing or may consider Mirena IUD.  Written information given regarding Mirena IUD.  7.  RTC 6 weeks or sooner as needed.    Subjective:   Divina Goyal is a 38 year old female who presents for a problem visit.  Patient states that since discontinuing the NuvaRing, she has noticed more dysmenorrhea that can begin weeks before menstrual bleeding starts.  She has been under a tremendous amount of stress after starting a new job for which she dedicates 50 hours/week.  \"There are not enough hours in the day.\"  She reports symptoms of both anxiety and depression.  History of childhood trauma, triggered lately after argument with her .  She states that she is safe at home.  LG-7: 19, \"very difficult\" and PHQ-9: 22, \"extremely difficult.\"  Patient has never been on antidepressant/anxiolytic medication but feels it may be indicated at this time.  She started counseling/therapy last week and has appointments weekly.  She wants to continue her job.  She denies any symptoms of gris such as grandiosity, racing thoughts, lack of need for sleep or a family history of bipolar affective disorder.    Immunization History   Administered Date(s) Administered     COVID-19 MONOVALENT 12+ " (Pfizer) 2021, 2021, 2021     Influenza Vaccine >6 months (Alfuria,Fluzone) 2019, 10/21/2021     TDAP (Adacel,Boostrix) 2008, 2018, 2019, 2021     Gynecologic History  Patient's last menstrual period was 2023 (approximate).  Contraception: vasectomy--current partner    Cancer screening:   Last Pap: 2020. Results were: NILM/neg HPV    OB History    Para Term  AB Living   2 2 2 0 0 2   SAB IAB Ectopic Multiple Live Births   0 0 0 0 2      # Outcome Date GA Lbr Loyd/2nd Weight Sex Delivery Anes PTL Lv   2 Term 21 40w6d 06:59 / 00:19 4.14 kg (9 lb 2 oz) M Vag-Spont Local, EPI N LUIS ANGEL      Complications: Chorioamnionitis      Name: Shadi      Apgar1: 7  Apgar5: 9   1 Term 19 39w4d / 01:25 3.232 kg (7 lb 2 oz) M Vag-Spont EPI, Nitrous, IV N LUIS ANGEL      Name: Cody      Apgar1: 9  Apgar5: 9       Current Outpatient Medications   Medication Sig Dispense Refill     clindamycin (CLEOCIN T) 1 % external lotion APPLY TO AFFECTED AREAS ON GROIN ONCE DAILY.       escitalopram (LEXAPRO) 10 MG tablet Take 1 tablet (10 mg) by mouth daily 30 tablet 1     ibuprofen (ADVIL/MOTRIN) 600 MG tablet Take 1 tablet (600 mg) by mouth every 6 hours as needed for moderate pain 30 tablet 1     levothyroxine (SYNTHROID/LEVOTHROID) 137 MCG tablet        metroNIDAZOLE (METROCREAM) 0.75 % external cream        SULFACLEANSE 8/4 8-4 % SUSP USE TO WASH AFFECTED AREAS ONCE DAILY IN THE SHOWER.LATHER & LET SIT FOR 3 TO 5 MINUTES BEFORE RINSING.       tretinoin (RETIN-A) 0.05 % external cream APPLY A PEA-SIZED AMOUNT TO ENTIRE FACE EVERY OTHER NIGHT INCREASING TO NIGHTLY AS TOLERATED MOISTURIZE AFTER       dicyclomine (BENTYL) 10 MG capsule Take 1 capsule (10 mg) by mouth 4 times per day (Patient not taking: Reported on 2022)       etonogestrel-ethinyl estradiol (NUVARING) 0.12-0.015 MG/24HR vaginal ring Place 1 each vaginally every 28 days (Patient not taking:  Insert one (1) ring vaginally and leave in place for 3 consecutive weeks (21 days), then remove for 1 week.  Reported on 6/2/2023) 3 each 3     ondansetron (ZOFRAN) 4 MG tablet TAKE 1-2 TABLETS BY MOUTH EVERY 6 HOURS AS NEEDED FOR NAUSEA (Patient not taking: Reported on 12/29/2022)       Past Medical History:   Diagnosis Date     Abnormal Pap smear of cervix      Chickenpox      Headache      HPV (human papilloma virus) infection      Hypothyroidism      Perineal laceration during delivery 11/01/2019    second degree     Preeclampsia     delivered 11/1/19     Seasonal allergies      Teratoma of ovary 04/10/2019    bilateral     Teratoma of ovary 04/09/2019    Bilateral, surgically removed during pregnancy     Thyroid disease      UTI (urinary tract infection)      Past Surgical History:   Procedure Laterality Date     APPENDECTOMY       APPENDECTOMY      as a child     GYN SURGERY       MOUTH SURGERY       [place] HYSTEROSCOPY,W/ENDO BX N/A 11/15/2019    Procedure: HYSTEROSCOPY, ULTRASOUND GUIDED DILATION AND CURETTAGE;  Surgeon: Alessandro Barber MD;  [place] [facility] [place]     [place] LAP,FULGURATE/EXCISE LESIONS N/A 11/15/2019    Procedure: LAPAROSCOPY, REMOVAL OF LEFT OVARIAN CYSTECTOMY, HYSTEROSCOPY , WITH ULTRASOUND;  Surgeon: Alessandro Barber MD;  [place] [facility] [place]  Service: [place]necology     TERATOMA EXCISION Bilateral 04/09/2019    Ovaries     Patient has no known allergies.  Family History   Problem Relation Age of Onset     Alcoholism Mother      Substance Abuse Mother      Early Death Mother      Mental Illness Mother      Cerebrovascular Disease Mother      Alcoholism Father      Arthritis Father      Hypertension Father      Alcoholism Brother      Substance Abuse Maternal Grandmother      Substance Abuse Maternal Grandfather      Cancer Maternal Grandfather      Hypertension Paternal Grandfather      Arthritis Paternal Aunt      Substance Abuse Paternal Uncle      Social  "History     Socioeconomic History     Marital status:      Spouse name: Derik     Number of children: 2     Years of education: Not on file     Highest education level: Not on file   Occupational History     Not on file   Tobacco Use     Smoking status: Former     Packs/day: 0.00     Types: Cigarettes     Smokeless tobacco: Never     Tobacco comments:     quit at age 19   Vaping Use     Vaping status: Never Used   Substance and Sexual Activity     Alcohol use: Yes     Drug use: Never     Comment: Drug use: Denies     Sexual activity: Yes     Partners: Male   Other Topics Concern     Not on file   Social History Narrative    . Has 4 month old son \"Cody\".  Works part time at [employer].     Social Determinants of Health     Financial Resource Strain: Not on file   Food Insecurity: Not on file   Transportation Needs: Not on file   Physical Activity: Not on file   Stress: Not on file   Social Connections: Not on file   Intimate Partner Violence: Not on file   Housing Stability: Not on file       Review of Systems  General:  Denies problem  Eyes: Denies problem  Ears/Nose/Throat: Denies problem  Cardiovascular: Denies problem  Respiratory:  Denies problem  Gastrointestinal:  denies problems  Genitourinary: denies problems  Musculoskeletal:  Denies problem  Skin: Denies problem  Neurologic:denies problems  Psychiatric: Please see subjective  Endocrine: denies problems        Objective:      [unfilled]  Vitals:    06/02/23 1010   BP: 118/70   Pulse: 74   Weight: 99.8 kg (220 lb)   Height: 1.753 m (5' 9\")       Physical Exam:  General Appearance: Alert, cooperative, no distress, appears stated age.  Tearful when discussing symptoms of anxiety and depression.  Appropriate eye contact and insight.  Dressed appropriately and does not appear to be responding to external stimuli.    Labs 12/29/2022:  TSH 0.58: (WNL)  Free T4: 1.66 (WNL)      "

## 2023-06-08 NOTE — TELEPHONE ENCOUNTER
Name of caller: Patient  Phone number where you may be reached: [phone]  Reason for call: pt saw her abnormal thyroid results from her last visit, and no one has called her. Pls call to discuss, does she need referral to Endocrinology?  Best time to call back: asap  If we don't reach you, is it okay to leave a detailed message? yes         Recommendation Preamble: The following recommendations were made during the visit: Recommendations (Free Text): 1. Keep the area very clean with warm water. Do not recommend using wipes in the area.\\n2. Have PCP put in a referral to a gastrointestinal specialist to rule out internal hemorrhoids or fissures. Detail Level: Zone Render Risk Assessment In Note?: no

## 2023-07-07 ENCOUNTER — OFFICE VISIT (OUTPATIENT)
Dept: MIDWIFE SERVICES | Facility: CLINIC | Age: 38
End: 2023-07-07
Payer: COMMERCIAL

## 2023-07-07 VITALS — WEIGHT: 213 LBS | SYSTOLIC BLOOD PRESSURE: 114 MMHG | DIASTOLIC BLOOD PRESSURE: 66 MMHG | BODY MASS INDEX: 31.45 KG/M2

## 2023-07-07 DIAGNOSIS — F41.9 ANXIETY: Primary | ICD-10-CM

## 2023-07-07 DIAGNOSIS — F32.A DEPRESSION, UNSPECIFIED DEPRESSION TYPE: ICD-10-CM

## 2023-07-07 PROCEDURE — 99214 OFFICE O/P EST MOD 30 MIN: CPT | Performed by: ADVANCED PRACTICE MIDWIFE

## 2023-07-07 RX ORDER — ESCITALOPRAM OXALATE 5 MG/1
5 TABLET ORAL DAILY
Qty: 30 TABLET | Refills: 3 | Status: SHIPPED | OUTPATIENT
Start: 2023-07-07 | End: 2023-08-26

## 2023-07-07 RX ORDER — ESCITALOPRAM OXALATE 10 MG/1
10 TABLET ORAL DAILY
Qty: 30 TABLET | Refills: 2 | Status: SHIPPED | OUTPATIENT
Start: 2023-07-07 | End: 2023-08-26

## 2023-07-10 NOTE — PROGRESS NOTES
"Assessment:   1.  MDD  2.  LG  3.  History of Childhood Trauma     Plan:      1. Discussed nutrition and exercise.  Advised MVI, Vitamin D3 2000IU geltab and an omega 3 supplement daily   2. Reviewed blood CMP result 23.  Tests: declines all HM /screening labs  3. Breast self exam technique reviewed and patient encouraged to perform self-exam monthly.   4. Contraception: Vasectomy  5.  Next pap due 2025. HPV co-testing discussed with that pap, then paps q 5 yrs if both negative.  6.  This writer advised to continue Lexapro as prescribed.  Be explained benefits risk of increasing her dosage, and she would like to increase to 15 mg p.o. daily.  Prescription sent to preferred pharmacy.  7.  RTC 8 weeks, PRN    Subjective:   Divina Goyal is a 38 year old female who presents for a follow-up visit after initiating Lexapro 10 mg p.o. daily.  She is seeing a therapist regularly named Kit.  PHQ-9: 13, \"somewhat difficult\"  LG- 7: 9, \"somewhat difficult\"    Immunization History   Administered Date(s) Administered    COVID-19 MONOVALENT 12+ (Pfizer) 2021, 2021, 2021    Influenza Vaccine >6 months (Alfuria,Fluzone) 2019, 10/21/2021    Influenza,INJ,MDCK,PF,Quad >6mo(Flucelvax) 2023    TDAP (Adacel,Boostrix) 2008, 2018, 2019, 2021     Gynecologic History  Patient's last menstrual period was 2023 (approximate).  Contraception: vasectomy--current partner    Cancer screening:   Last Pap: 2020. Results were: normal    OB History    Para Term  AB Living   2 2 2 0 0 2   SAB IAB Ectopic Multiple Live Births   0 0 0 0 2      # Outcome Date GA Lbr Loyd/2nd Weight Sex Delivery Anes PTL Lv   2 Term 21 40w6d 06:59 / 00:19 4.14 kg (9 lb 2 oz) M Vag-Spont Local, EPI N LUIS ANGEL      Complications: Chorioamnionitis      Name: Shadi      Apgar1: 7  Apgar5: 9   1 Term 19 39w4d / 01:25 3.232 kg (7 lb 2 oz) M Vag-Spont EPI, Nitrous, IV N LUIS ANGEL      " Name: Cody      Apgar1: 9  Apgar5: 9       Current Outpatient Medications   Medication Sig Dispense Refill    clindamycin (CLEOCIN T) 1 % external lotion APPLY TO AFFECTED AREAS ON GROIN ONCE DAILY.      escitalopram (LEXAPRO) 10 MG tablet Take 1 tablet (10 mg) by mouth daily 30 tablet 2    escitalopram (LEXAPRO) 5 MG tablet Take 1 tablet (5 mg) by mouth daily 30 tablet 3    ibuprofen (ADVIL/MOTRIN) 600 MG tablet Take 1 tablet (600 mg) by mouth every 6 hours as needed for moderate pain 30 tablet 1    levothyroxine (SYNTHROID/LEVOTHROID) 137 MCG tablet       metroNIDAZOLE (METROCREAM) 0.75 % external cream       SULFACLEANSE 8/4 8-4 % SUSP USE TO WASH AFFECTED AREAS ONCE DAILY IN THE SHOWER.LATHER & LET SIT FOR 3 TO 5 MINUTES BEFORE RINSING.      tretinoin (RETIN-A) 0.05 % external cream APPLY A PEA-SIZED AMOUNT TO ENTIRE FACE EVERY OTHER NIGHT INCREASING TO NIGHTLY AS TOLERATED MOISTURIZE AFTER      dicyclomine (BENTYL) 10 MG capsule Take 1 capsule (10 mg) by mouth 4 times per day (Patient not taking: Reported on 12/29/2022)      etonogestrel-ethinyl estradiol (NUVARING) 0.12-0.015 MG/24HR vaginal ring Place 1 each vaginally every 28 days (Patient not taking: Insert one (1) ring vaginally and leave in place for 3 consecutive weeks (21 days), then remove for 1 week.  Reported on 6/2/2023) 3 each 3    ondansetron (ZOFRAN) 4 MG tablet TAKE 1-2 TABLETS BY MOUTH EVERY 6 HOURS AS NEEDED FOR NAUSEA (Patient not taking: Reported on 12/29/2022)       Past Medical History:   Diagnosis Date    Abnormal Pap smear of cervix     Chickenpox     Headache     HPV (human papilloma virus) infection     Hypothyroidism     Perineal laceration during delivery 11/01/2019    second degree    Preeclampsia     delivered 11/1/19    Seasonal allergies     Teratoma of ovary 04/10/2019    bilateral    Teratoma of ovary 04/09/2019    Bilateral, surgically removed during pregnancy    Thyroid disease     UTI (urinary tract infection)      Past  "Surgical History:   Procedure Laterality Date    APPENDECTOMY      APPENDECTOMY      as a child    GYN SURGERY      MOUTH SURGERY      [place] HYSTEROSCOPY,W/ENDO BX N/A 11/15/2019    Procedure: HYSTEROSCOPY, ULTRASOUND GUIDED DILATION AND CURETTAGE;  Surgeon: Alessandro Barber MD;  Location: [facility];  Service: Gynecology    TN LAP,FULGURATE/EXCISE LESIONS N/A 11/15/2019    Procedure: LAPAROSCOPY, REMOVAL OF LEFT OVARIAN CYSTECTOMY, HYSTEROSCOPY , WITH ULTRASOUND;  Surgeon: Alessandro Barber MD;  Location: [facility];  Service: Gynecology    TERATOMA EXCISION Bilateral 04/09/2019    Ovaries     Patient has no known allergies.  Family History   Problem Relation Age of Onset    Alcoholism Mother     Substance Abuse Mother     Early Death Mother     Mental Illness Mother     Cerebrovascular Disease Mother     Alcoholism Father     Arthritis Father     Hypertension Father     Alcoholism Brother     Substance Abuse Maternal Grandmother     Substance Abuse Maternal Grandfather     Cancer Maternal Grandfather     Hypertension Paternal Grandfather     Arthritis Paternal Aunt     Substance Abuse Paternal Uncle      Social History     Socioeconomic History    Marital status:      Spouse name: Derik    Number of children: 2    Years of education: Not on file    Highest education level: Not on file   Occupational History    Not on file   Tobacco Use    Smoking status: Former     Packs/day: 0.00     Types: Cigarettes    Smokeless tobacco: Never    Tobacco comments:     quit at age 19   Vaping Use    Vaping Use: Never used   Substance and Sexual Activity    Alcohol use: Yes    Drug use: Never     Comment: Drug use: Denies    Sexual activity: Yes     Partners: Male   Other Topics Concern    Not on file   Social History Narrative    . Has 4 month old son \"Cody\".  Works part time at [employer].     Social Determinants of Health     Financial Resource Strain: Not on file   Food Insecurity: Not on file "   Transportation Needs: Not on file   Physical Activity: Not on file   Stress: Not on file   Social Connections: Not on file   Intimate Partner Violence: Not on file   Housing Stability: Not on file       Review of Systems  General:  Denies problem  Eyes: Denies problem  Ears/Nose/Throat: Denies problem  Cardiovascular: Denies problem  Respiratory:  Denies problem  Gastrointestinal:  denies problems  Genitourinary: denies problems  Musculoskeletal:  Denies problem  Skin: Denies problem  Neurologic:denies problems  Psychiatric: denies problems  Endocrine: denies problems        Objective:      Vitals:    07/07/23 1127   BP: 114/66   Weight: 96.6 kg (213 lb)       Physical Exam:  General Appearance: Alert, cooperative, no distress, appears stated age

## 2023-07-13 ASSESSMENT — PATIENT HEALTH QUESTIONNAIRE - PHQ9
SUM OF ALL RESPONSES TO PHQ QUESTIONS 1-9: 14
5. POOR APPETITE OR OVEREATING: SEVERAL DAYS

## 2023-07-13 ASSESSMENT — ANXIETY QUESTIONNAIRES
6. BECOMING EASILY ANNOYED OR IRRITABLE: MORE THAN HALF THE DAYS
GAD7 TOTAL SCORE: 9
5. BEING SO RESTLESS THAT IT IS HARD TO SIT STILL: SEVERAL DAYS
GAD7 TOTAL SCORE: 9
1. FEELING NERVOUS, ANXIOUS, OR ON EDGE: SEVERAL DAYS
7. FEELING AFRAID AS IF SOMETHING AWFUL MIGHT HAPPEN: MORE THAN HALF THE DAYS
3. WORRYING TOO MUCH ABOUT DIFFERENT THINGS: SEVERAL DAYS
IF YOU CHECKED OFF ANY PROBLEMS ON THIS QUESTIONNAIRE, HOW DIFFICULT HAVE THESE PROBLEMS MADE IT FOR YOU TO DO YOUR WORK, TAKE CARE OF THINGS AT HOME, OR GET ALONG WITH OTHER PEOPLE: SOMEWHAT DIFFICULT
2. NOT BEING ABLE TO STOP OR CONTROL WORRYING: SEVERAL DAYS

## 2023-08-25 ENCOUNTER — VIRTUAL VISIT (OUTPATIENT)
Dept: MIDWIFE SERVICES | Facility: CLINIC | Age: 38
End: 2023-08-25
Payer: COMMERCIAL

## 2023-08-25 DIAGNOSIS — F32.A DEPRESSION, UNSPECIFIED DEPRESSION TYPE: Primary | ICD-10-CM

## 2023-08-25 DIAGNOSIS — F41.9 ANXIETY: ICD-10-CM

## 2023-08-25 PROCEDURE — 99213 OFFICE O/P EST LOW 20 MIN: CPT | Mod: 93 | Performed by: ADVANCED PRACTICE MIDWIFE

## 2023-08-25 ASSESSMENT — ANXIETY QUESTIONNAIRES
5. BEING SO RESTLESS THAT IT IS HARD TO SIT STILL: SEVERAL DAYS
3. WORRYING TOO MUCH ABOUT DIFFERENT THINGS: NOT AT ALL
GAD7 TOTAL SCORE: 7
4. TROUBLE RELAXING: SEVERAL DAYS
GAD7 TOTAL SCORE: 7
1. FEELING NERVOUS, ANXIOUS, OR ON EDGE: SEVERAL DAYS
2. NOT BEING ABLE TO STOP OR CONTROL WORRYING: SEVERAL DAYS
IF YOU CHECKED OFF ANY PROBLEMS ON THIS QUESTIONNAIRE, HOW DIFFICULT HAVE THESE PROBLEMS MADE IT FOR YOU TO DO YOUR WORK, TAKE CARE OF THINGS AT HOME, OR GET ALONG WITH OTHER PEOPLE: SOMEWHAT DIFFICULT
7. FEELING AFRAID AS IF SOMETHING AWFUL MIGHT HAPPEN: SEVERAL DAYS
6. BECOMING EASILY ANNOYED OR IRRITABLE: MORE THAN HALF THE DAYS

## 2023-08-25 ASSESSMENT — PATIENT HEALTH QUESTIONNAIRE - PHQ9: SUM OF ALL RESPONSES TO PHQ QUESTIONS 1-9: 9

## 2023-08-25 NOTE — PROGRESS NOTES
"Divina is a 38 year old who is being evaluated via a billable telephone visit.  The purpose of this visit is in regard to follow-up after increasing Lexapro dosage from 10 mg/day to 15 mg/day on 7/7/2023.    What phone number would you like to be contacted at? [phone]  How would you like to obtain your AVS? MyChart    Distant Location (provider location):  On-site  Patient location: Home      Assessment & Plan   Problem List Items Addressed This Visit          Behavioral    Depression - Primary    Relevant Medications    escitalopram (LEXAPRO) 20 MG tablet       Other    Anxiety    Relevant Medications    escitalopram (LEXAPRO) 20 MG tablet        Prescription drug management  25 minutes spent by me on the date of the encounter doing chart review, history and exam, documentation and further activities per the note       BMI:   Estimated body mass index is 31.45 kg/m  as calculated from the following:    Height as of 6/2/23: 1.753 m (5' 9\").    Weight as of 7/7/23: 96.6 kg (213 lb).       MEDICATIONS:        - Increase Lexapro to 20 mg p.o. daily (from 15 mg p.o. daily).  FUTURE APPOINTMENTS:       - Follow-up office or E-visit in 2 to 3 months  SELF MONITORING:       - Mood, sleep  Work on weight loss  Regular exercise    Return in about 2 months (around 10/25/2023), or if symptoms worsen or fail to improve, for Follow up.    Gloria Smith CNM  [facility]   Divina is a 38 year old, presenting for the following health issues:  Follow-up Lexapro dosage increased on 7/7/2023 from 10 to 15 mg/day.      HPI     Divina is affected by symptoms of anxiety and depression.  She was taking Lexapro 15 mg p.o. daily since last appointment on 7/7/2023 in order to affect/better treat her symptoms (and on that day, PHQ-9 was 13 and LG-7 was 9) today, PHQ-9 is down to 9 and LG-7 and down to 7.  Symptoms have improved, although, patient reports that every dosage increase of Lexapro " "causes his symptoms to improve over the first 4 to 5 weeks, then she feels the same as she did on the previous dosage.  She is willing to increase her dosage to the maximum before switching to another antidepressant/anxiolytic prescription medication.  She continues to see a therapist regularly named \"Bryce.\"  She has also had productive conversations with her  regarding home life, teamwork and helping to meet one another's needs.  Improving.    Review of Systems   Constitutional, HEENT, cardiovascular, pulmonary, GI, , musculoskeletal, neuro, skin, endocrine and psych systems are negative, except as otherwise noted.      Objective             Physical Exam   healthy, alert, and no distress  PSYCH: Alert and oriented times 3; coherent speech, normal   rate and volume, able to articulate logical thoughts, able   to abstract reason, no tangential thoughts, no hallucinations   or delusions  Her affect is normal and pleasant  RESP: No cough, no audible wheezing, able to talk in full sentences  Remainder of exam unable to be completed due to telephone visits    Office Visit on 06/02/2023   Component Date Value Ref Range Status    Sodium 06/02/2023 137  136 - 145 mmol/L Final    Potassium 06/02/2023 4.4  3.4 - 5.3 mmol/L Final    Chloride 06/02/2023 103  98 - 107 mmol/L Final    Carbon Dioxide (CO2) 06/02/2023 25  22 - 29 mmol/L Final    Anion Gap 06/02/2023 9  7 - 15 mmol/L Final    Urea Nitrogen 06/02/2023 11.4  6.0 - 20.0 mg/dL Final    Creatinine 06/02/2023 0.87  0.51 - 0.95 mg/dL Final    Calcium 06/02/2023 9.4  8.6 - 10.0 mg/dL Final    Glucose 06/02/2023 105 (H)  70 - 99 mg/dL Final    Alkaline Phosphatase 06/02/2023 67  35 - 104 U/L Final    AST 06/02/2023 27  10 - 35 U/L Final    ALT 06/02/2023 30  10 - 35 U/L Final    Protein Total 06/02/2023 7.1  6.4 - 8.3 g/dL Final    Albumin 06/02/2023 4.2  3.5 - 5.2 g/dL Final    Bilirubin Total 06/02/2023 0.5  <=1.2 mg/dL Final    GFR Estimate 06/02/2023 88  >60 " mL/min/1.73m2 Final    eGFR calculated using 2021 CKD-EPI equation.     No results found for this or any previous visit (from the past 24 hour(s)).            Phone call duration: 11 minutes

## 2023-08-26 RX ORDER — ESCITALOPRAM OXALATE 20 MG/1
20 TABLET ORAL DAILY
Qty: 30 TABLET | Refills: 2 | Status: SHIPPED | OUTPATIENT
Start: 2023-08-26 | End: 2023-11-08

## 2023-11-08 ENCOUNTER — TELEPHONE (OUTPATIENT)
Dept: MIDWIFE SERVICES | Facility: CLINIC | Age: 38
End: 2023-11-08
Payer: COMMERCIAL

## 2023-11-08 DIAGNOSIS — F41.9 ANXIETY: ICD-10-CM

## 2023-11-08 DIAGNOSIS — F32.A DEPRESSION, UNSPECIFIED DEPRESSION TYPE: ICD-10-CM

## 2023-11-08 RX ORDER — ESCITALOPRAM OXALATE 20 MG/1
20 TABLET ORAL DAILY
Qty: 30 TABLET | Refills: 0 | Status: SHIPPED | OUTPATIENT
Start: 2023-11-08 | End: 2023-11-30

## 2023-11-08 NOTE — TELEPHONE ENCOUNTER
Mediation refill request received from patient.  Medication requested:    Pending Prescriptions:                       Disp   Refills    escitalopram (LEXAPRO) 20 MG tablet       30 tab*2            Sig: Take 1 tablet (20 mg) by mouth daily    Medication last prescribed 8/26/23  Last visit with [facility] CN group: 8/25/23  Upcoming appointment(s): 11/30/23    Refill request routed to on-call CNM for review.

## 2023-11-08 NOTE — TELEPHONE ENCOUNTER
[facility] Call Center    Phone Message    May a detailed message be left on voicemail: yes     Reason for Call: Other: Patient is calling to get a refill of the medication for Lexapro Please call the patient back to discuss. The patient did make another appointment with the provider but that is November 30 and the patient stated she would need a refill before then. Thank you.     Action Taken: Other: obgyn    Travel Screening: Not Applicable

## 2023-11-30 ENCOUNTER — OFFICE VISIT (OUTPATIENT)
Dept: MIDWIFE SERVICES | Facility: CLINIC | Age: 38
End: 2023-11-30
Payer: COMMERCIAL

## 2023-11-30 VITALS
SYSTOLIC BLOOD PRESSURE: 108 MMHG | HEART RATE: 76 BPM | DIASTOLIC BLOOD PRESSURE: 64 MMHG | BODY MASS INDEX: 31.01 KG/M2 | WEIGHT: 210 LBS

## 2023-11-30 DIAGNOSIS — E03.9 HYPOTHYROIDISM, UNSPECIFIED TYPE: ICD-10-CM

## 2023-11-30 DIAGNOSIS — F41.9 ANXIETY: ICD-10-CM

## 2023-11-30 DIAGNOSIS — F32.A DEPRESSION, UNSPECIFIED DEPRESSION TYPE: Primary | ICD-10-CM

## 2023-11-30 PROCEDURE — 84439 ASSAY OF FREE THYROXINE: CPT | Performed by: ADVANCED PRACTICE MIDWIFE

## 2023-11-30 PROCEDURE — 84443 ASSAY THYROID STIM HORMONE: CPT | Performed by: ADVANCED PRACTICE MIDWIFE

## 2023-11-30 PROCEDURE — 36415 COLL VENOUS BLD VENIPUNCTURE: CPT | Performed by: ADVANCED PRACTICE MIDWIFE

## 2023-11-30 PROCEDURE — 99214 OFFICE O/P EST MOD 30 MIN: CPT | Performed by: ADVANCED PRACTICE MIDWIFE

## 2023-11-30 RX ORDER — ESCITALOPRAM OXALATE 20 MG/1
20 TABLET ORAL DAILY
Qty: 30 TABLET | Refills: 11 | Status: SHIPPED | OUTPATIENT
Start: 2023-11-30 | End: 2024-08-29

## 2023-11-30 RX ORDER — LEVOTHYROXINE SODIUM 137 UG/1
137 TABLET ORAL DAILY
Qty: 39 TABLET | Refills: 11 | Status: SHIPPED | OUTPATIENT
Start: 2023-11-30 | End: 2024-03-03

## 2023-11-30 ASSESSMENT — ANXIETY QUESTIONNAIRES
2. NOT BEING ABLE TO STOP OR CONTROL WORRYING: NOT AT ALL
7. FEELING AFRAID AS IF SOMETHING AWFUL MIGHT HAPPEN: NOT AT ALL
3. WORRYING TOO MUCH ABOUT DIFFERENT THINGS: NOT AT ALL
5. BEING SO RESTLESS THAT IT IS HARD TO SIT STILL: SEVERAL DAYS
4. TROUBLE RELAXING: NOT AT ALL
GAD7 TOTAL SCORE: 2
1. FEELING NERVOUS, ANXIOUS, OR ON EDGE: NOT AT ALL
6. BECOMING EASILY ANNOYED OR IRRITABLE: SEVERAL DAYS
GAD7 TOTAL SCORE: 2
IF YOU CHECKED OFF ANY PROBLEMS ON THIS QUESTIONNAIRE, HOW DIFFICULT HAVE THESE PROBLEMS MADE IT FOR YOU TO DO YOUR WORK, TAKE CARE OF THINGS AT HOME, OR GET ALONG WITH OTHER PEOPLE: SOMEWHAT DIFFICULT

## 2023-11-30 ASSESSMENT — PATIENT HEALTH QUESTIONNAIRE - PHQ9
10. IF YOU CHECKED OFF ANY PROBLEMS, HOW DIFFICULT HAVE THESE PROBLEMS MADE IT FOR YOU TO DO YOUR WORK, TAKE CARE OF THINGS AT HOME, OR GET ALONG WITH OTHER PEOPLE: SOMEWHAT DIFFICULT
SUM OF ALL RESPONSES TO PHQ QUESTIONS 1-9: 8
SUM OF ALL RESPONSES TO PHQ QUESTIONS 1-9: 8

## 2023-12-01 ENCOUNTER — TELEPHONE (OUTPATIENT)
Dept: MIDWIFE SERVICES | Facility: CLINIC | Age: 38
End: 2023-12-01
Payer: COMMERCIAL

## 2023-12-01 DIAGNOSIS — E03.9 HYPOTHYROIDISM, UNSPECIFIED TYPE: Primary | ICD-10-CM

## 2023-12-01 DIAGNOSIS — E03.9 HYPOTHYROIDISM, UNSPECIFIED TYPE: ICD-10-CM

## 2023-12-01 LAB
T4 FREE SERPL-MCNC: 1.75 NG/DL (ref 0.9–1.7)
TSH SERPL DL<=0.005 MIU/L-ACNC: 0.04 UIU/ML (ref 0.3–4.2)

## 2023-12-01 RX ORDER — LEVOTHYROXINE SODIUM 125 UG/1
125 TABLET ORAL DAILY
Qty: 30 TABLET | Refills: 1 | Status: SHIPPED | OUTPATIENT
Start: 2023-12-01 | End: 2023-12-01

## 2023-12-01 RX ORDER — LEVOTHYROXINE SODIUM 125 UG/1
125 TABLET ORAL DAILY
Qty: 90 TABLET | Refills: 1 | Status: SHIPPED | OUTPATIENT
Start: 2023-12-01 | End: 2024-08-29

## 2023-12-01 NOTE — TELEPHONE ENCOUNTER
Incoming fax received from [facility] on [address] in [place], [place].  Pharmacy is requesting 90-day supply of Levothyroxine 0.125 mg (125 mcg) tablets.  Current order is for 30-day supply with 1 refill.  Request routed to on-call CNM for review.

## 2023-12-04 NOTE — PROGRESS NOTES
Assessment:   1.  MDD on Lexapro, stable  2.  LG on Lexapro, stable  3.  Hypothyroidism    Plan:      1. Discussed nutrition and exercise.    2. Blood tests: TSH and free T4 today.  3. Breast self exam technique reviewed and patient encouraged to perform self-exam monthly.   4. Contraception: Vasectomy  5.  Next pap due 2025. HPV co-testing discussed with that pap, then paps q 5 yrs if both negative.  6.  Encouraged to go to bed by 10 PM to encourage 8 hours of sleep to evaluate effect on fatigue.  7.  Both Lexapro 20 m p.o. daily and levothyroxine 137 mcg p.o. daily refilled.  8.  Encouraged to seek out another counselor/therapist.  9.  RTC in 2 months or sooner PRN    Subjective:   Divina Goyal is a 38 year old female who presents for follow-up related to MDD and LG treatment with Lexapro.  Lexapro was increased from 15 mg/day to 20 mg/day on 2023.  PHQ-9 score today: 8 which is down from 9 (3 months ago), and LG-7 today: 2, down from 7 (3 months ago).  She parted ways with her therapist.  Work is stressful and demanding.  Marriage/relationship improving.  Reports feeling fatigued, but goes to bed between 11 and midnight and wakes up at 6 AM.    Immunization History   Administered Date(s) Administered    COVID-19 MONOVALENT 12+ (Pfizer) 2021, 2021, 2021    Influenza Vaccine >6 months,quad, PF 2019, 10/21/2021    Influenza,INJ,MDCK,PF,Quad >6mo(Flucelvax) 2023    TDAP (Adacel,Boostrix) 2008, 2018, 2019, 2021     Gynecologic History  No LMP recorded (lmp unknown).  Contraception: vasectomy--current partner    Cancer screening:   Last Pap: 2020. Results were: Normal/negative HPV    OB History    Para Term  AB Living   2 2 2 0 0 2   SAB IAB Ectopic Multiple Live Births   0 0 0 0 2      # Outcome Date GA Lbr Loyd/2nd Weight Sex Delivery Anes PTL Lv   2 Term 21 40w6d 06:59 / 00:19 4.14 kg (9 lb 2 oz) M Vag-Spont Local, EPI N  LUIS ANGEL      Complications: Chorioamnionitis      Name: Shadi      Apgar1: 7  Apgar5: 9   1 Term 11/01/19 39w4d / 01:25 3.232 kg (7 lb 2 oz) M Vag-Spont EPI, Nitrous, IV N LUIS ANGEL      Name: Cody      Apgar1: 9  Apgar5: 9       Current Outpatient Medications   Medication Sig Dispense Refill    clindamycin (CLEOCIN T) 1 % external lotion APPLY TO AFFECTED AREAS ON GROIN ONCE DAILY.      escitalopram (LEXAPRO) 20 MG tablet Take 1 tablet (20 mg) by mouth daily 30 tablet 11    ibuprofen (ADVIL/MOTRIN) 600 MG tablet Take 1 tablet (600 mg) by mouth every 6 hours as needed for moderate pain 30 tablet 1    levothyroxine (SYNTHROID/LEVOTHROID) 137 MCG tablet Take 1 tablet (137 mcg) by mouth daily 39 tablet 11    metroNIDAZOLE (METROCREAM) 0.75 % external cream       SULFACLEANSE 8/4 8-4 % SUSP USE TO WASH AFFECTED AREAS ONCE DAILY IN THE SHOWER.LATHER & LET SIT FOR 3 TO 5 MINUTES BEFORE RINSING.      tretinoin (RETIN-A) 0.05 % external cream APPLY A PEA-SIZED AMOUNT TO ENTIRE FACE EVERY OTHER NIGHT INCREASING TO NIGHTLY AS TOLERATED MOISTURIZE AFTER      dicyclomine (BENTYL) 10 MG capsule Take 1 capsule (10 mg) by mouth 4 times per day (Patient not taking: Reported on 12/29/2022)      levothyroxine (SYNTHROID/LEVOTHROID) 125 MCG tablet Take 1 tablet (125 mcg) by mouth daily 90 tablet 1    ondansetron (ZOFRAN) 4 MG tablet TAKE 1-2 TABLETS BY MOUTH EVERY 6 HOURS AS NEEDED FOR NAUSEA (Patient not taking: Reported on 12/29/2022)       Past Medical History:   Diagnosis Date    Abnormal Pap smear of cervix     Chickenpox     Headache     HPV (human papilloma virus) infection     Hypothyroidism     Perineal laceration during delivery 11/01/2019    second degree    Preeclampsia     delivered 11/1/19    Seasonal allergies     Teratoma of ovary 04/10/2019    bilateral    Teratoma of ovary 04/09/2019    Bilateral, surgically removed during pregnancy    Thyroid disease     UTI (urinary tract infection)      Past Surgical History:  "  Procedure Laterality Date    APPENDECTOMY      APPENDECTOMY      as a child    GYN SURGERY      MOUTH SURGERY      AK HYSTEROSCOPY,W/ENDO BX N/A 11/15/2019    Procedure: HYSTEROSCOPY, ULTRASOUND GUIDED DILATION AND CURETTAGE;  Surgeon: Alessandro Barber MD;  Location: [facility] [place];  Service: Gynecology    AK LAP,FULGURATE/EXCISE LESIONS N/A 11/15/2019    Procedure: LAPAROSCOPY, REMOVAL OF LEFT OVARIAN CYSTECTOMY, HYSTEROSCOPY , WITH ULTRASOUND;  Surgeon: Alessandro Barber MD;  Location: [facility] [place];  Service: Gynecology    TERATOMA EXCISION Bilateral 04/09/2019    Ovaries     Patient has no known allergies.  Family History   Problem Relation Age of Onset    Alcoholism Mother     Substance Abuse Mother     Early Death Mother     Mental Illness Mother     Cerebrovascular Disease Mother     Alcoholism Father     Arthritis Father     Hypertension Father     Alcoholism Brother     Substance Abuse Maternal Grandmother     Substance Abuse Maternal Grandfather     Cancer Maternal Grandfather     Hypertension Paternal Grandfather     Arthritis Paternal Aunt     Substance Abuse Paternal Uncle      Social History     Socioeconomic History    Marital status:      Spouse name: Derik    Number of children: 2    Years of education: Not on file    Highest education level: Not on file   Occupational History    Not on file   Tobacco Use    Smoking status: Former     Packs/day: 0     Types: Cigarettes    Smokeless tobacco: Never    Tobacco comments:     quit at age 19   Vaping Use    Vaping Use: Never used   Substance and Sexual Activity    Alcohol use: Yes    Drug use: Never     Comment: Drug use: Denies    Sexual activity: Yes     Partners: Male   Other Topics Concern    Not on file   Social History Narrative    . Has 4 month old son \"Cody\".  Works part time at [employer].     Social Determinants of Health     Financial Resource Strain: Not on file   Food Insecurity: Not on file   Transportation " Needs: Not on file   Physical Activity: Not on file   Stress: Not on file   Social Connections: Not on file   Interpersonal Safety: Not on file   Housing Stability: Not on file       Review of Systems  General:  Denies problem  Eyes: Denies problem  Ears/Nose/Throat: Denies problem  Cardiovascular: Denies problem  Respiratory:  Denies problem  Gastrointestinal:  denies problems  Genitourinary: denies problems  Musculoskeletal:  Denies problem  Skin: Denies problem  Neurologic:denies problems  Psychiatric:  Please see subjective  Endocrine: fatigue    Answers submitted by the patient for this visit:  Patient Health Questionnaire (Submitted on 11/30/2023)  If you checked off any problems, how difficult have these problems made it for you to do your work, take care of things at home, or get along with other people?: Somewhat difficult  PHQ9 TOTAL SCORE: 8  LG-7 (Submitted on 11/30/2023)  LG 7 TOTAL SCORE: 2    Objective:     Vitals:    11/30/23 1715   BP: 108/64   Pulse: 76   Weight: 95.3 kg (210 lb)       Physical Exam:  General Appearance: Alert, cooperative, no distress, appears stated age

## 2024-01-16 ENCOUNTER — OFFICE VISIT (OUTPATIENT)
Dept: OBGYN | Facility: CLINIC | Age: 39
End: 2024-01-16
Payer: COMMERCIAL

## 2024-01-16 VITALS
SYSTOLIC BLOOD PRESSURE: 115 MMHG | BODY MASS INDEX: 30.64 KG/M2 | OXYGEN SATURATION: 98 % | DIASTOLIC BLOOD PRESSURE: 74 MMHG | HEART RATE: 58 BPM | WEIGHT: 207.5 LBS

## 2024-01-16 DIAGNOSIS — N30.00 ACUTE CYSTITIS WITHOUT HEMATURIA: Primary | ICD-10-CM

## 2024-01-16 LAB
BACTERIA #/AREA URNS HPF: ABNORMAL /HPF
RBC #/AREA URNS AUTO: ABNORMAL /HPF
SQUAMOUS #/AREA URNS AUTO: ABNORMAL /LPF
WBC #/AREA URNS AUTO: ABNORMAL /HPF
WBC CLUMPS #/AREA URNS HPF: PRESENT /HPF

## 2024-01-16 PROCEDURE — 87088 URINE BACTERIA CULTURE: CPT | Performed by: OBSTETRICS & GYNECOLOGY

## 2024-01-16 PROCEDURE — 99214 OFFICE O/P EST MOD 30 MIN: CPT | Performed by: OBSTETRICS & GYNECOLOGY

## 2024-01-16 PROCEDURE — 81015 MICROSCOPIC EXAM OF URINE: CPT | Performed by: OBSTETRICS & GYNECOLOGY

## 2024-01-16 PROCEDURE — 87186 SC STD MICRODIL/AGAR DIL: CPT | Performed by: OBSTETRICS & GYNECOLOGY

## 2024-01-16 PROCEDURE — 87086 URINE CULTURE/COLONY COUNT: CPT | Performed by: OBSTETRICS & GYNECOLOGY

## 2024-01-16 RX ORDER — ESTRADIOL 10 UG/1
10 INSERT VAGINAL
Qty: 12 TABLET | Refills: 11 | Status: SHIPPED | OUTPATIENT
Start: 2024-01-17 | End: 2024-01-18

## 2024-01-16 RX ORDER — NITROFURANTOIN 25; 75 MG/1; MG/1
100 CAPSULE ORAL 2 TIMES DAILY
Qty: 14 CAPSULE | Refills: 0 | Status: SHIPPED | OUTPATIENT
Start: 2024-01-16 | End: 2024-01-23

## 2024-01-16 NOTE — PROGRESS NOTES
GYN Clinic Visit  Date of visit: 2024   Chief Complaint: UTIs    HPI:   Divina Goyal is a 38 year old  female who presents for evaluation of recurrent UTIs.    6-7 UTIs in past year  Stopped OCP but no other changes  Same partner, no h/o STIs  Sex may be a trigger    Visits for UTIs , may, , sept, nov    10k-49k Citrobacter koseri - Nov 30. Resistant to cefazolin but susceptible to everything else     Pressure between urinating, feels constant urge, burning at end of urination, no blood  Takes AZO     Latest Reference Range & Units 24 11:07   WBC Urine 0-5 /HPF /HPF 10-25 !   RBC Urine 0-2 /HPF /HPF None Seen   Bacteria Urine None Seen /HPF Few !   WBC Clumps None Seen /HPF Present !   Squamous Epithelial /LPF Urine None Seen /LPF Few !   !: Data is abnormal        Obstetric History:   OB History    Para Term  AB Living   2 2 2 0 0 2   SAB IAB Ectopic Multiple Live Births   0 0 0 0 2      # Outcome Date GA Lbr Loyd/2nd Weight Sex Delivery Anes PTL Lv   2 Term 21 40w6d 06:59 / 00:19 4.14 kg (9 lb 2 oz) M Vag-Spont Local, EPI N LUIS ANGEL      Complications: Chorioamnionitis      Name: Shadi      Apgar1: 7  Apgar5: 9   1 Term 19 39w4d / 01:25 3.232 kg (7 lb 2 oz) M Vag-Spont EPI, Nitrous, IV N LUIS ANGEL      Name: Cody      Apgar1: 9  Apgar5: 9       Past Medical History:  Past Medical History:   Diagnosis Date    Abnormal Pap smear of cervix     Chickenpox     Headache     HPV (human papilloma virus) infection     Hypothyroidism     Perineal laceration during delivery 2019    second degree    Preeclampsia     delivered 19    Seasonal allergies     Teratoma of ovary 04/10/2019    bilateral    Teratoma of ovary 2019    Bilateral, surgically removed during pregnancy    Thyroid disease     UTI (urinary tract infection)        Past Surgical History:  Past Surgical History:   Procedure Laterality Date    APPENDECTOMY      APPENDECTOMY      as a child    GYN  SURGERY      MOUTH SURGERY      [place] HYSTEROSCOPY,W/ENDO BX N/A 11/15/2019    Procedure: HYSTEROSCOPY, ULTRASOUND GUIDED DILATION AND CURETTAGE;  Surgeon: Alessandro Barber MD;  Location: [facility] [place];  Service: Gynecology    WV LAP,FULGURATE/EXCISE LESIONS N/A 11/15/2019    Procedure: LAPAROSCOPY, REMOVAL OF LEFT OVARIAN CYSTECTOMY, HYSTEROSCOPY , WITH ULTRASOUND;  Surgeon: Alessandro Barber MD;  Location: [facility] [place];  Service: Gynecology    TERATOMA EXCISION Bilateral 2019    Ovaries         Medications:  Current Outpatient Medications   Medication    clindamycin (CLEOCIN T) 1 % external lotion    escitalopram (LEXAPRO) 20 MG tablet    ibuprofen (ADVIL/MOTRIN) 600 MG tablet    levothyroxine (SYNTHROID/LEVOTHROID) 125 MCG tablet    metroNIDAZOLE (METROCREAM) 0.75 % external cream    SULFACLEANSE 8/4 8-4 % SUSP    tretinoin (RETIN-A) 0.05 % external cream    levothyroxine (SYNTHROID/LEVOTHROID) 137 MCG tablet     No current facility-administered medications for this visit.       Allergy:  No Known Allergies  Patient denies food, latex or environmental allergies.   Physical Exam:  Vitals:    24 1107   BP: 115/74   Pulse: 58   SpO2: 98%   Weight: 94.1 kg (207 lb 8 oz)     Body mass index is 30.64 kg/m .    Gen: healthy, alert, active, no distress       Latest Reference Range & Units 24 11:07   WBC Urine 0-5 /HPF /HPF 10-25 !   RBC Urine 0-2 /HPF /HPF None Seen   Bacteria Urine None Seen /HPF Few !   WBC Clumps None Seen /HPF Present !   Squamous Epithelial /LPF Urine None Seen /LPF Few !   !: Data is abnormal    Assessment:  Divina Goyal is a 38 year old  who presents in consultation for recurrent UTIs.     Plan:  1. Acute cystitis without hematuria  Discussed hygiene -patient already practicing  Recommend cranberry tabs daily  Discussed options of vaginal estrogen and prophylactic abx after sex  She is interested in vaginal estrogen. Will contact us if rx is too expensive - then  can use [facility] or [facility].   Will treat for UTI today, discussed that if bacteria from today's sample is resistant we will call her to change abx    - UA with Microscopic reflex to Culture - lab collect; Future  - UA Microscopic with Reflex to Culture  - Urine Culture  - estradiol (VAGIFEM) 10 MCG TABS vaginal tablet; Place 1 tablet (10 mcg) vaginally three times a week  Dispense: 12 tablet; Refill: 11  - nitroFURantoin macrocrystal-monohydrate (MACROBID) 100 MG capsule; Take 1 capsule (100 mg) by mouth 2 times daily for 7 days  Dispense: 14 capsule; Refill: 0      Maribel Carlos MD

## 2024-01-16 NOTE — PATIENT INSTRUCTIONS
Cranberry juice 8-ounce (approximately 240 mL) glass once or twice daily or cranberry concentrate tablets 500 mg to 1000 mg total daily dose.

## 2024-01-17 LAB — BACTERIA UR CULT: ABNORMAL

## 2024-01-18 ENCOUNTER — TELEPHONE (OUTPATIENT)
Dept: OBGYN | Facility: CLINIC | Age: 39
End: 2024-01-18
Payer: COMMERCIAL

## 2024-01-18 DIAGNOSIS — N30.00 ACUTE CYSTITIS WITHOUT HEMATURIA: Primary | ICD-10-CM

## 2024-01-18 RX ORDER — ESTRADIOL 10 UG/1
10 INSERT VAGINAL
Qty: 12 TABLET | Refills: 11 | Status: SHIPPED | OUTPATIENT
Start: 2024-01-19 | End: 2024-08-29

## 2024-01-18 NOTE — TELEPHONE ENCOUNTER
Patient called back. Relayed Dr Carlos's message to her. She is going to be out of town from Monday-Monday so will be able to get a follow up UA done once she returns.     Vaginal estrogen was over $200 at her pharmacy so going to try sending it to [facility] pharmacy.    NIOCLAS Delacruz

## 2024-01-18 NOTE — TELEPHONE ENCOUNTER
I tried to call Divina about results, no answer, left voicemail.   Not sure if she gets Radico message.  Can you try to call her again?    The urine culture confirmed you have another UTI with the citrobacter koseri. The antibiotic I prescribed should treat it bc it is susceptible. Given your history of recurrent UTIs, it would be reasonable to repeat the urine culture a week or 2 after your complete treatment to confirm it has resolved.     Can you check if she was able to get vaginal estrogen? If it was too expensive we can try either [facility] or [facility].    Maribel Carlos MD

## 2024-02-25 ENCOUNTER — HEALTH MAINTENANCE LETTER (OUTPATIENT)
Age: 39
End: 2024-02-25

## 2024-02-28 ENCOUNTER — E-VISIT (OUTPATIENT)
Dept: URGENT CARE | Facility: CLINIC | Age: 39
End: 2024-02-28
Payer: COMMERCIAL

## 2024-02-28 ENCOUNTER — TELEPHONE (OUTPATIENT)
Dept: MIDWIFE SERVICES | Facility: CLINIC | Age: 39
End: 2024-02-28

## 2024-02-28 DIAGNOSIS — R30.0 DYSURIA: Primary | ICD-10-CM

## 2024-02-28 PROCEDURE — 99207 PR NON-BILLABLE SERV PER CHARTING: CPT | Performed by: NURSE PRACTITIONER

## 2024-02-28 NOTE — TELEPHONE ENCOUNTER
[facility] Call Center    Phone Message    May a detailed message be left on voicemail: yes     Reason for Call: Other: Pt has another UTI she believes. Pt is wondering if she needs to be seen again for an RX or if it can just be prescribed. Please advise     Action Taken: Other: Women's Health    Travel Screening: Not Applicable

## 2024-02-29 NOTE — PATIENT INSTRUCTIONS
Dear Divina MORALES See,     After reviewing your responses, I would like you to come in for a urine test to make sure we treat you correctly. This urine test is to evaluate you for a possible urinary tract infection, and should be scheduled for today or tomorrow. Schedule a Lab Only appointment here.     Lab appointments are not available at most locations on the weekends. If no Lab Only appointment is available, you should be seen in any of our convenient Walk-in or Urgent Care Centers, which can be found on our website here.     You will receive instructions with your results in [facility] once they are available.     If your symptoms worsen, you develop pain in your back or stomach, develop fevers, or are not improving in 5 days, please contact your primary care provider for an appointment or visit a Walk-in or Urgent Care Center to be seen.     Thanks again for choosing us as your health care partner,     Db West NP  Urinary Tract Infection (UTI) in Women: Care Instructions  Overview     A urinary tract infection, or UTI, is a general term for an infection anywhere between the kidneys and the urethra (where urine comes out). Most UTIs are bladder infections. They often cause pain or burning when you urinate.  UTIs are caused by bacteria and can be cured with antibiotics. Be sure to complete your treatment so that the infection does not get worse.  Follow-up care is a key part of your treatment and safety. Be sure to make and go to all appointments, and call your doctor if you are having problems. It's also a good idea to know your test results and keep a list of the medicines you take.  How can you care for yourself at home?  Take your antibiotics as directed. Do not stop taking them just because you feel better. You need to take the full course of antibiotics.  Drink extra water and other fluids for the next day or two. This will help make the urine less concentrated and help wash out the bacteria that are  "causing the infection. (If you have kidney, heart, or liver disease and have to limit fluids, talk with your doctor before you increase the amount of fluids you drink.)  Avoid drinks that are carbonated or have caffeine. They can irritate the bladder.  Urinate often. Try to empty your bladder each time.  To relieve pain, take a hot bath or lay a heating pad set on low over your lower belly or genital area. Never go to sleep with a heating pad in place.  To prevent UTIs  Drink plenty of water each day. This helps you urinate often, which clears bacteria from your system. (If you have kidney, heart, or liver disease and have to limit fluids, talk with your doctor before you increase the amount of fluids you drink.)  Urinate when you need to.  If you are sexually active, urinate right after you have sex.  Change sanitary pads often.  Avoid douches, bubble baths, feminine hygiene sprays, and other feminine hygiene products that have deodorants.  After going to the bathroom, wipe from front to back.  When should you call for help?   Call your doctor now or seek immediate medical care if:    Symptoms such as fever, chills, nausea, or vomiting get worse or appear for the first time.     You have new pain in your back just below your rib cage. This is called flank pain.     There is new blood or pus in your urine.     You have any problems with your antibiotic medicine.   Watch closely for changes in your health, and be sure to contact your doctor if:    You are not getting better after taking an antibiotic for 2 days.     Your symptoms go away but then come back.   Where can you learn more?  Go to https://[email or web address].net/patiented  Enter K848 in the search box to learn more about \"Urinary Tract Infection (UTI) in Women: Care Instructions.\"  Current as of: February 28, 2023               Content Version: 13.8    2188-7611 [employer].   Care instructions adapted under license by your healthcare professional. " If you have questions about a medical condition or this instruction, always ask your healthcare professional. [employer] disclaims any warranty or liability for your use of this information.

## 2024-03-03 ENCOUNTER — OFFICE VISIT (OUTPATIENT)
Dept: FAMILY MEDICINE | Facility: CLINIC | Age: 39
End: 2024-03-03
Payer: COMMERCIAL

## 2024-03-03 VITALS
HEART RATE: 69 BPM | DIASTOLIC BLOOD PRESSURE: 74 MMHG | TEMPERATURE: 99 F | BODY MASS INDEX: 31.93 KG/M2 | RESPIRATION RATE: 16 BRPM | WEIGHT: 216.2 LBS | OXYGEN SATURATION: 97 % | SYSTOLIC BLOOD PRESSURE: 113 MMHG

## 2024-03-03 DIAGNOSIS — R30.0 DYSURIA: ICD-10-CM

## 2024-03-03 DIAGNOSIS — N30.00 ACUTE CYSTITIS WITHOUT HEMATURIA: Primary | ICD-10-CM

## 2024-03-03 LAB
BACTERIA #/AREA URNS HPF: ABNORMAL /HPF
RBC #/AREA URNS AUTO: >100 /HPF
SQUAMOUS #/AREA URNS AUTO: ABNORMAL /LPF
WBC #/AREA URNS AUTO: ABNORMAL /HPF
WBC CLUMPS #/AREA URNS HPF: PRESENT /HPF

## 2024-03-03 PROCEDURE — 87086 URINE CULTURE/COLONY COUNT: CPT | Performed by: PHYSICIAN ASSISTANT

## 2024-03-03 PROCEDURE — 99213 OFFICE O/P EST LOW 20 MIN: CPT | Performed by: PHYSICIAN ASSISTANT

## 2024-03-03 PROCEDURE — 81015 MICROSCOPIC EXAM OF URINE: CPT | Performed by: PHYSICIAN ASSISTANT

## 2024-03-03 RX ORDER — NITROFURANTOIN 25; 75 MG/1; MG/1
100 CAPSULE ORAL 2 TIMES DAILY
Qty: 10 CAPSULE | Refills: 0 | Status: SHIPPED | OUTPATIENT
Start: 2024-03-03 | End: 2024-04-27

## 2024-03-03 RX ORDER — NITROFURANTOIN 25; 75 MG/1; MG/1
100 CAPSULE ORAL 2 TIMES DAILY
Qty: 10 CAPSULE | Refills: 0 | Status: SHIPPED | OUTPATIENT
Start: 2024-03-03 | End: 2024-03-03

## 2024-03-03 ASSESSMENT — ENCOUNTER SYMPTOMS
DYSURIA: 1
NAUSEA: 0
FREQUENCY: 0
FEVER: 0
HEMATURIA: 0
CHILLS: 0
FLANK PAIN: 0
VOMITING: 0
ABDOMINAL PAIN: 0

## 2024-03-03 NOTE — PROGRESS NOTES
Patient presents with:  Dysuria: X5d, burning with urination, pressure      Clinical Decision Making:  Dysuria. Urine micro consistent with UTI. No symptoms concerning for pyelonephritis. Patient started on Macrobid today. UC in process.       ICD-10-CM    1. Acute cystitis without hematuria  N30.00 nitroFURantoin macrocrystal-monohydrate (MACROBID) 100 MG capsule     DISCONTINUED: nitroFURantoin macrocrystal-monohydrate (MACROBID) 100 MG capsule      2. Dysuria  R30.0 UA Microscopic with Reflex to Culture     Urine Culture     CANCELED: UA with Microscopic reflex to Culture - Clinic Collect          Patient Instructions   1. Increase fluid intake  2. Complete antibiotic regimen as prescribed. You will be notified if the treatment plan needs to be changed based on the urine culture results.   3. Follow if you have a fever of 100.4 F (38 C) or higher, no improvement after three days of treatment, trouble urinating because of pain,new or increased discharge from the vagina, rash or joint pain, Increased back or abdominal pain.      HPI:  Divina Goyal is a 38 year old female who presents today complaining of UTI sxs x 5 days. Patient has been taking AZO. Patient denies any concern for STD or pregnancy. She has been getting UTIs more frequently. Her last one was in Jan. She plans to follow up with her women's care provider about the frequency. Urology referral offered, but declined for now.     History obtained from the patient.    Problem List:  2023-06: Insomnia  2023-06: Dysmenorrhea  2022-12: Anxiety  2022-12: Depression  2021-09: Pelvic floor dysfunction  2021-07: First degree perineal laceration  2021-07: Encounter for other contraceptive management  2021-07: Normal labor  2021-07: Normal labor  2021-04: Excessive weight gain during pregnancy in third trimester  2021-04: Low ferritin  2021-04: Excessive weight gain during pregnancy in third trimester  2021-02: GBS bacteriuria  2021-02: Pregnancy related fatigue in  third trimester  2021: Pregnancy related fatigue in third trimester  2021: Low-lying placenta  2021: Low-lying placenta  2020: Genetic screening  2020: Supervision of high-risk pregnancy of elderly multigravida  2020: Multigravida of advanced maternal age in third trimester  2020: Multigravida of advanced maternal age in third trimester  2020: History of pre-eclampsia  2020: Lactating mother  2019: Pap smear for cervical cancer screening  2019: Dermoid cyst of both ovaries  2019: Routine postpartum follow-up  2019: Pre-eclampsia in third trimester  2019-10: Pregnant  2019-10: Unstable fetal lie, single or unspecified fetus  2019-10: Breech presentation  2019-10: Hidradenitis suppurativa  2019-10: Transverse lie of fetus  2019: Carpal tunnel syndrome during pregnancy  2019: Encounter for supervision of normal first pregnancy in third   trimester  2019: Abnormal fetal ultrasound  2019: Excessive weight gain affecting pregnancy  2019: BMI 31.0-31.9,adult  2019: Supervision of normal first pregnancy  2019: Teratoma of ovary  2018: Cervical high risk HPV (human papillomavirus) test positive  2018: Atypical squamous cells of undetermined significance (ASCUS)   on Papanicolaou smear of cervix  2012: Environmental allergies  2012: Hammer toe  2012: Tension headache  Hypothyroidism   (normal spontaneous vaginal delivery)      Past Medical History:   Diagnosis Date    Abnormal Pap smear of cervix     Chickenpox     Headache     HPV (human papilloma virus) infection     Hypothyroidism     Perineal laceration during delivery 2019    second degree    Preeclampsia     delivered 19    Seasonal allergies     Teratoma of ovary 04/10/2019    bilateral    Teratoma of ovary 2019    Bilateral, surgically removed during pregnancy    Thyroid disease     UTI (urinary tract infection)        Social History     Tobacco Use    Smoking status:  Former     Packs/day: 0     Types: Cigarettes    Smokeless tobacco: Never    Tobacco comments:     quit at age 19   Substance Use Topics    Alcohol use: Yes       Review of Systems   Constitutional:  Negative for chills and fever.   Gastrointestinal:  Negative for abdominal pain, nausea and vomiting.   Genitourinary:  Positive for dysuria and pelvic pain. Negative for flank pain, frequency, hematuria, vaginal discharge and vaginal pain.       Vitals:    03/03/24 1036   BP: 113/74   Pulse: 69   Resp: 16   Temp: 99  F (37.2  C)   TempSrc: Oral   SpO2: 97%   Weight: 98.1 kg (216 lb 3.2 oz)       Physical Exam  Vitals and nursing note reviewed.   Constitutional:       General: She is not in acute distress.     Appearance: She is not ill-appearing.   HENT:      Head: Normocephalic and atraumatic.      Right Ear: External ear normal.      Left Ear: External ear normal.   Eyes:      Conjunctiva/sclera: Conjunctivae normal.   Abdominal:      General: Abdomen is flat. There is no distension.      Tenderness: There is no abdominal tenderness. There is no right CVA tenderness, left CVA tenderness or guarding.   Neurological:      Mental Status: She is alert.   Psychiatric:         Mood and Affect: Mood normal.         Behavior: Behavior normal.         Thought Content: Thought content normal.         Judgment: Judgment normal.         Results:  Results for orders placed or performed in visit on 03/03/24   UA Microscopic with Reflex to Culture     Status: Abnormal   Result Value Ref Range    Bacteria Urine Moderate (A) None Seen /HPF    RBC Urine >100 (A) 0-2 /HPF /HPF    WBC Urine  (A) 0-5 /HPF /HPF    Squamous Epithelials Urine Few (A) None Seen /LPF    WBC Clumps Urine Present (A) None Seen /HPF         At the end of the encounter, I discussed results, diagnosis, medications. Discussed red flags for immediate return to clinic/ER, as well as indications for follow up if no improvement. Patient understood and agreed to  plan. Patient was stable for discharge.

## 2024-03-03 NOTE — TELEPHONE ENCOUNTER
"Patient had declined a clinic appointment with this writer on Friday, March 1, but she declined thinking that she would be seen in urgent care before then.  She made an appointment to be seen at a [facility] clinic tomorrow, and has been taking OTC Azo for her UTI symptoms.  This writer recommends walk-in care at [facility] today instead.  We discussed the possibility of empiric treatment and the limitations without a urine sample/culture to verify the organism considering last UTI was caused by an atypical bacteria called \"Citrobacter koseri\" on January 16, 2024, a little over a month ago.  Patient would like to go to walk-in care.  She declines empiric treatment.  This writer is supportive of her decision as it is best practice.  All questions answered.  "

## 2024-03-04 LAB — BACTERIA UR CULT: NORMAL

## 2024-03-27 ENCOUNTER — TELEPHONE (OUTPATIENT)
Dept: MIDWIFE SERVICES | Facility: CLINIC | Age: 39
End: 2024-03-27
Payer: COMMERCIAL

## 2024-03-27 NOTE — TELEPHONE ENCOUNTER
[facility] Call [facility]    Phone Message    May a detailed message be left on voicemail: yes     Reason for Call: Other: Pt scheduled an appointment with Gloria Smith to discuss her recurring UTIs and how to cure them. Earliest appt with Gloria was 4/26. Pt was okay with being scheduled and put on the wait list, but she asked if writer could send a message to Gloria Smith to see if there was anything sooner. Please contact pt if there is.      Action Taken: Message routed to:  Other: [facility]    Travel Screening: Not Applicable

## 2024-03-27 NOTE — TELEPHONE ENCOUNTER
Gisel Tavarez, REYES  You13 minutes ago (10:59 AM)     BZ  Unless she is currently symptomatic, ok to make a preventative plan and referral to urology on currently scheduled date.  Thanks for checking.     You  Gisel Tavarez, MAYLINM23 minutes ago (10:48 AM)     DB  Okay to wait until 4/26/24?  Or should we see if there is a sooner double book?    Thank you,  Yesenia

## 2024-04-26 ENCOUNTER — OFFICE VISIT (OUTPATIENT)
Dept: MIDWIFE SERVICES | Facility: CLINIC | Age: 39
End: 2024-04-26
Payer: COMMERCIAL

## 2024-04-26 VITALS
HEART RATE: 71 BPM | DIASTOLIC BLOOD PRESSURE: 64 MMHG | WEIGHT: 209.7 LBS | BODY MASS INDEX: 30.97 KG/M2 | SYSTOLIC BLOOD PRESSURE: 113 MMHG

## 2024-04-26 DIAGNOSIS — M62.89 PELVIC FLOOR DYSFUNCTION: ICD-10-CM

## 2024-04-26 DIAGNOSIS — R45.1 RESTLESSNESS: ICD-10-CM

## 2024-04-26 DIAGNOSIS — E03.9 HYPOTHYROIDISM, UNSPECIFIED TYPE: ICD-10-CM

## 2024-04-26 DIAGNOSIS — N39.3 URINARY, INCONTINENCE, STRESS FEMALE: ICD-10-CM

## 2024-04-26 DIAGNOSIS — F32.A DEPRESSION, UNSPECIFIED DEPRESSION TYPE: ICD-10-CM

## 2024-04-26 DIAGNOSIS — F41.9 ANXIETY: ICD-10-CM

## 2024-04-26 DIAGNOSIS — N39.0 RECURRENT UTI: ICD-10-CM

## 2024-04-26 DIAGNOSIS — R30.0 DYSURIA: ICD-10-CM

## 2024-04-26 DIAGNOSIS — R39.15 URINARY URGENCY: Primary | ICD-10-CM

## 2024-04-26 LAB
ALBUMIN SERPL BCG-MCNC: 4.5 G/DL (ref 3.5–5.2)
ALBUMIN UR-MCNC: NEGATIVE MG/DL
ALP SERPL-CCNC: 66 U/L (ref 40–150)
ALT SERPL W P-5'-P-CCNC: 19 U/L (ref 0–50)
ANION GAP SERPL CALCULATED.3IONS-SCNC: 7 MMOL/L (ref 7–15)
APPEARANCE UR: CLEAR
AST SERPL W P-5'-P-CCNC: 21 U/L (ref 0–45)
BILIRUB SERPL-MCNC: 0.4 MG/DL
BILIRUB UR QL STRIP: NEGATIVE
BUN SERPL-MCNC: 10.7 MG/DL (ref 6–20)
CALCIUM SERPL-MCNC: 9.4 MG/DL (ref 8.6–10)
CHLORIDE SERPL-SCNC: 104 MMOL/L (ref 98–107)
COLOR UR AUTO: YELLOW
CREAT SERPL-MCNC: 0.71 MG/DL (ref 0.51–0.95)
DEPRECATED HCO3 PLAS-SCNC: 28 MMOL/L (ref 22–29)
EGFRCR SERPLBLD CKD-EPI 2021: >90 ML/MIN/1.73M2
ERYTHROCYTE [DISTWIDTH] IN BLOOD BY AUTOMATED COUNT: 12.8 % (ref 10–15)
GLUCOSE SERPL-MCNC: 91 MG/DL (ref 70–99)
GLUCOSE UR STRIP-MCNC: NEGATIVE MG/DL
HCT VFR BLD AUTO: 38.6 % (ref 35–47)
HGB BLD-MCNC: 12.8 G/DL (ref 11.7–15.7)
HGB UR QL STRIP: NEGATIVE
KETONES UR STRIP-MCNC: NEGATIVE MG/DL
LEUKOCYTE ESTERASE UR QL STRIP: NEGATIVE
MCH RBC QN AUTO: 28.4 PG (ref 26.5–33)
MCHC RBC AUTO-ENTMCNC: 33.2 G/DL (ref 31.5–36.5)
MCV RBC AUTO: 86 FL (ref 78–100)
NITRATE UR QL: NEGATIVE
PH UR STRIP: 8 [PH] (ref 5–8)
PLATELET # BLD AUTO: 348 10E3/UL (ref 150–450)
POTASSIUM SERPL-SCNC: 5.5 MMOL/L (ref 3.4–5.3)
PROT SERPL-MCNC: 7.3 G/DL (ref 6.4–8.3)
RBC # BLD AUTO: 4.5 10E6/UL (ref 3.8–5.2)
SODIUM SERPL-SCNC: 139 MMOL/L (ref 135–145)
SP GR UR STRIP: 1.01 (ref 1–1.03)
T4 FREE SERPL-MCNC: 1.77 NG/DL (ref 0.9–1.7)
TSH SERPL DL<=0.005 MIU/L-ACNC: 0.08 UIU/ML (ref 0.3–4.2)
UROBILINOGEN UR STRIP-ACNC: 0.2 E.U./DL
WBC # BLD AUTO: 7.6 10E3/UL (ref 4–11)

## 2024-04-26 PROCEDURE — 80053 COMPREHEN METABOLIC PANEL: CPT | Performed by: ADVANCED PRACTICE MIDWIFE

## 2024-04-26 PROCEDURE — 84439 ASSAY OF FREE THYROXINE: CPT | Performed by: ADVANCED PRACTICE MIDWIFE

## 2024-04-26 PROCEDURE — 99215 OFFICE O/P EST HI 40 MIN: CPT | Performed by: ADVANCED PRACTICE MIDWIFE

## 2024-04-26 PROCEDURE — 36415 COLL VENOUS BLD VENIPUNCTURE: CPT | Performed by: ADVANCED PRACTICE MIDWIFE

## 2024-04-26 PROCEDURE — 81003 URINALYSIS AUTO W/O SCOPE: CPT | Performed by: ADVANCED PRACTICE MIDWIFE

## 2024-04-26 PROCEDURE — 84443 ASSAY THYROID STIM HORMONE: CPT | Performed by: ADVANCED PRACTICE MIDWIFE

## 2024-04-26 PROCEDURE — 85027 COMPLETE CBC AUTOMATED: CPT | Performed by: ADVANCED PRACTICE MIDWIFE

## 2024-04-26 PROCEDURE — 87086 URINE CULTURE/COLONY COUNT: CPT | Performed by: ADVANCED PRACTICE MIDWIFE

## 2024-04-26 NOTE — PROGRESS NOTES
"Assessment:   1.  Recurrent UTI  2.  Urinary urgency, consider interstitial cystitis  3.  Urinary stress incontinence  4.  MDD and LG on Lexapro  5.  Restlessness  6.  Hypothyroidism     Plan:      1. UA with UC if indicated  2. Blood tests: CBC, CMP, TSH and free T4  3.  Referral to urology for recurrent UTIs.   4. Contraception: Vasectomy  5.  Referral to physical therapy for urinary stress incontinence and history of pelvic floor dysfunction.  6.  Referral to to psychiatry for further evaluation of restlessness/rule out ADD and consider medication adjustment for anxiety/depression  7.  RTC 1 year for annual physical exam, PRN    Subjective:   Divina Goyal is a 39 year old female who presents for a problem visit.  She offers the following concerns:  Recurrent UTIs.  She desires to investigate why this occurs considering her bathroom hygiene, urinating after sexual intercourse and p.o. hydration.  Urinary urgency can be confusing with a history of urinary tract infections.  Urinary stress incontinence is distressing.  Patient is willing to go to physical therapy.  Depression symptoms and anxiety symptoms are somewhat stable on Lexapro.  Patient is uncertain whether she needs a dosage adjustment.  Additionally, patient reports a feeling of \"restlessness\" and wonders if she has ADD.  Hypothyroidism: Requesting labs today.    Immunization History   Administered Date(s) Administered    COVID-19 MONOVALENT 12+ (Pfizer) 2021, 2021, 2021    Influenza Vaccine >6 months,quad, PF 2019, 10/21/2021    Influenza,INJ,MDCK,PF,Quad >6mo(Flucelvax) 2023    TDAP (Adacel,Boostrix) 2008, 2018, 2019, 2021       Gynecologic History  Patient's last menstrual period was 2024 (within days).  Contraception: vasectomy--current partner      OB History    Para Term  AB Living   2 2 2 0 0 2   SAB IAB Ectopic Multiple Live Births   0 0 0 0 2      # Outcome Date GA Lbr " Loyd/2nd Weight Sex Type Anes PTL Lv   2 Term 07/06/21 40w6d 06:59 / 00:19 4.14 kg (9 lb 2 oz) M Vag-Spont Local, EPI N ULIS ANGEL      Complications: Intraamniotic Infection      Name: Shadi      Apgar1: 7  Apgar5: 9   1 Term 11/01/19 39w4d / 01:25 3.232 kg (7 lb 2 oz) M Vag-Spont EPI, Nitrous, IV N LUIS ANGEL      Name: Cody      Apgar1: 9  Apgar5: 9       Current Outpatient Medications   Medication Sig Dispense Refill    clindamycin (CLEOCIN T) 1 % external lotion APPLY TO AFFECTED AREAS ON GROIN ONCE DAILY.      escitalopram (LEXAPRO) 20 MG tablet Take 1 tablet (20 mg) by mouth daily 30 tablet 11    ibuprofen (ADVIL/MOTRIN) 600 MG tablet Take 1 tablet (600 mg) by mouth every 6 hours as needed for moderate pain 30 tablet 1    levothyroxine (SYNTHROID/LEVOTHROID) 125 MCG tablet Take 1 tablet (125 mcg) by mouth daily 90 tablet 1    metroNIDAZOLE (METROCREAM) 0.75 % external cream       SULFACLEANSE 8/4 8-4 % SUSP USE TO WASH AFFECTED AREAS ONCE DAILY IN THE SHOWER.LATHER & LET SIT FOR 3 TO 5 MINUTES BEFORE RINSING.      tretinoin (RETIN-A) 0.05 % external cream APPLY A PEA-SIZED AMOUNT TO ENTIRE FACE EVERY OTHER NIGHT INCREASING TO NIGHTLY AS TOLERATED MOISTURIZE AFTER      estradiol (VAGIFEM) 10 MCG TABS vaginal tablet Place 1 tablet (10 mcg) vaginally three times a week (Patient not taking: Reported on 3/3/2024) 12 tablet 11    nitroFURantoin macrocrystal-monohydrate (MACROBID) 100 MG capsule Take 1 capsule (100 mg) by mouth 2 times daily (Patient not taking: Reported on 4/26/2024) 10 capsule 0     Past Medical History:   Diagnosis Date    Abnormal Pap smear of cervix     Chickenpox     Headache     HPV (human papilloma virus) infection     Hypothyroidism     Perineal laceration during delivery 11/01/2019    second degree    Preeclampsia     delivered 11/1/19    Seasonal allergies     Teratoma of ovary 04/10/2019    bilateral    Teratoma of ovary 04/09/2019    Bilateral, surgically removed during pregnancy    Thyroid  "disease     UTI (urinary tract infection)      Past Surgical History:   Procedure Laterality Date    APPENDECTOMY      APPENDECTOMY      as a child    GYN SURGERY      MOUTH SURGERY      NE HYSTEROSCOPY,W/ENDO BX N/A 11/15/2019    Procedure: HYSTEROSCOPY, ULTRASOUND GUIDED DILATION AND CURETTAGE;  Surgeon: Alessandro Barber MD;  Location: [facility];  Service: Gynecology    NE LAP,FULGURATE/EXCISE LESIONS N/A 11/15/2019    Procedure: LAPAROSCOPY, REMOVAL OF LEFT OVARIAN CYSTECTOMY, HYSTEROSCOPY , WITH ULTRASOUND;  Surgeon: Alessandro Barber MD;  Location: [facility];  Service: Gynecology    TERATOMA EXCISION Bilateral 04/09/2019    Ovaries     Patient has no known allergies.  Family History   Problem Relation Age of Onset    Alcoholism Mother     Substance Abuse Mother     Early Death Mother     Mental Illness Mother     Cerebrovascular Disease Mother     Alcoholism Father     Arthritis Father     Hypertension Father     Alcoholism Brother     Substance Abuse Maternal Grandmother     Substance Abuse Maternal Grandfather     Cancer Maternal Grandfather     Hypertension Paternal Grandfather     Arthritis Paternal Aunt     Substance Abuse Paternal Uncle      Social History     Socioeconomic History    Marital status:      Spouse name: Derik    Number of children: 2    Years of education: Not on file    Highest education level: Not on file   Occupational History    Not on file   Tobacco Use    Smoking status: Former     Types: Cigarettes    Smokeless tobacco: Never    Tobacco comments:     quit at age 19   Vaping Use    Vaping status: Never Used   Substance and Sexual Activity    Alcohol use: Yes    Drug use: Never     Comment: Drug use: Denies    Sexual activity: Yes     Partners: Male   Other Topics Concern    Not on file   Social History Narrative    . Has 4 month old son \"Cody\".  Works part time at [employer].     Social Determinants of Health     Financial Resource Strain: Not on " file   Food Insecurity: Not on file   Transportation Needs: Not on file   Physical Activity: Not on file   Stress: Not on file   Social Connections: Unknown (6/27/2022)    [facility]    Social Connections     Frequency of Communication with Friends and Family: Not on file   Interpersonal Safety: Not on file   Housing Stability: Not on file       Review of Systems  General:  Denies problem  Cardiovascular: Denies problem  Respiratory:  Denies problem  Gastrointestinal:  denies problems  Genitourinary: Please see subjective   Psychiatric: Restlessness, please see subjective   Endocrine: Please see subjective         Objective:     Vitals:    04/26/24 1030   BP: 113/64   Pulse: 71   Weight: 95.1 kg (209 lb 11.2 oz)       Physical Exam:  General Appearance: Alert, cooperative, no distress, appears stated age

## 2024-04-27 DIAGNOSIS — E03.9 HYPOTHYROIDISM, UNSPECIFIED TYPE: Primary | ICD-10-CM

## 2024-04-27 LAB — BACTERIA UR CULT: NO GROWTH

## 2024-04-27 RX ORDER — LEVOTHYROXINE SODIUM 112 UG/1
112 TABLET ORAL DAILY
Qty: 30 TABLET | Refills: 2 | Status: SHIPPED | OUTPATIENT
Start: 2024-04-27 | End: 2024-07-03

## 2024-05-28 DIAGNOSIS — E03.9 HYPOTHYROIDISM, UNSPECIFIED TYPE: ICD-10-CM

## 2024-05-28 RX ORDER — LEVOTHYROXINE SODIUM 112 UG/1
112 TABLET ORAL DAILY
Qty: 90 TABLET | Refills: 0 | OUTPATIENT
Start: 2024-05-28

## 2024-05-28 NOTE — TELEPHONE ENCOUNTER
Incoming 90 day prescription request received. Medication requested is:     Pending Prescriptions:                       Disp   Refills    levothyroxine (SYNTHROID/LEVOTHROID) 112 *90 tab*0            Sig: Take 1 tablet (112 mcg) by mouth daily      Medication last prescribed on 04/27/24. Last visit with this provider group 04/26/24. Request sent to on call CNM for review.    Home

## 2024-06-07 ENCOUNTER — THERAPY VISIT (OUTPATIENT)
Dept: PHYSICAL THERAPY | Facility: CLINIC | Age: 39
End: 2024-06-07
Attending: ADVANCED PRACTICE MIDWIFE
Payer: COMMERCIAL

## 2024-06-07 DIAGNOSIS — N39.0 RECURRENT UTI: ICD-10-CM

## 2024-06-07 DIAGNOSIS — R39.15 URINARY URGENCY: ICD-10-CM

## 2024-06-07 DIAGNOSIS — M62.89 PELVIC FLOOR DYSFUNCTION: ICD-10-CM

## 2024-06-07 DIAGNOSIS — N39.3 URINARY, INCONTINENCE, STRESS FEMALE: ICD-10-CM

## 2024-06-07 PROCEDURE — 97161 PT EVAL LOW COMPLEX 20 MIN: CPT | Mod: GP

## 2024-06-07 PROCEDURE — 97140 MANUAL THERAPY 1/> REGIONS: CPT | Mod: GP

## 2024-06-07 PROCEDURE — 97110 THERAPEUTIC EXERCISES: CPT | Mod: GP

## 2024-06-07 NOTE — PROGRESS NOTES
PHYSICAL THERAPY EVALUATION  Type of Visit: Evaluation    See electronic medical record for Abuse and Falls Screening details.    Subjective       Presenting condition or subjective complaint: Recurrent UTIs starting about a year ago, none within the last 2 months, PCP recommended urology referral. Pt reduced her coffee intake which she attributes to reduced LUTS. Reports some hip soreness since birth of second child, feels achy and sore.   Date of onset: 04/26/24 (date of order)    Relevant medical history: -- (ovarian cyst removal 2019)   Past Medical History:   Diagnosis Date    Abnormal Pap smear of cervix     Chickenpox     Headache     HPV (human papilloma virus) infection     Hypothyroidism     Perineal laceration during delivery 11/01/2019    second degree    Preeclampsia     delivered 11/1/19    Seasonal allergies     Teratoma of ovary 04/10/2019    bilateral    Teratoma of ovary 04/09/2019    Bilateral, surgically removed during pregnancy    Thyroid disease     UTI (urinary tract infection)      Dates & types of surgery:    Past Surgical History:   Procedure Laterality Date    APPENDECTOMY      APPENDECTOMY      as a child    GYN SURGERY      MOUTH SURGERY      NV HYSTEROSCOPY,W/ENDO BX N/A 11/15/2019    Procedure: HYSTEROSCOPY, ULTRASOUND GUIDED DILATION AND CURETTAGE;  Surgeon: Alessandro Barber MD;  Location: [facility];  Service: Gynecology    NV LAP,FULGURATE/EXCISE LESIONS N/A 11/15/2019    Procedure: LAPAROSCOPY, REMOVAL OF LEFT OVARIAN CYSTECTOMY, HYSTEROSCOPY , WITH ULTRASOUND;  Surgeon: Alessandro Barber MD;  Location: [facility];  Service: Gynecology    TERATOMA EXCISION Bilateral 04/09/2019    Ovaries       Prior diagnostic imaging/testing results:       Prior therapy history for the same diagnosis, illness or injury: Yes 2021 after her son was born    Prior Level of Function  Transfers:   Ambulation:   ADL:   IADL:     Living Environment  Social support:     Type of home:      Stairs to enter the home:         Ramp:     Stairs inside the home:         Help at home:    Equipment owned:       Employment: Yes analyst  Hobbies/Interests: hiking, volleyball , weight training    Patient goals for therapy: to get her pelvic floor strong and balanced, play volleyball without leakage, resolve rectal leakage    Pain assessment:      Objective      PELVIC EVALUATION  ADDITIONAL HISTORY:  Sex assigned at birth: Female  Gender identity: Female    Pronouns:        Bladder History:  Feels bladder filling: Yes (urges are fairly normal, build quickly and then slow.)  Triggers for feeling of inability to wait to go to the bathroom: Yes cough, sneeze, or leak  How long can you wait to urinate:    Gets up at night to urinate: No    Can stop the flow of urine when urinating: Yes  Volume of urine usually released: Average   Other issues: Straining to pass urine; Trouble emptying bladder completely (Only with her UTIs)  Number of bladder infections in last 12 months: 7ish  Fluid intake per day: 5-8 cups per day 1 cup 2-3 times per month  Medications taken for bladder: No     Activities causing urine leak: Cough; Sneeze; Jump; Run; Hurrying to the bathroom due to a strong urge to urinate (pee); Other activities walking  Amount of urine typically leaked: coughing is the worst with enough to wet pants, otherwise quarter sized amount on underwear  Pads used to help with leaking: Yes (sometimes) I use this many pads per day: pad with workout or volleyball I use this type/brand: panty liner    Bowel History:  Frequency of bowel movement: daily or every other day  Consistency of stool: Soft-formed    Ignores the urge to defecate:    Other bowel issues: Loss of stool (discoloration on undwear before or after loose stool)  Length of time spent trying to have a bowel movement:      Sexual Function History:  Sexual orientation:      Sexually active: Yes  Lubrication used: -- (sometimes) -- (sometimes)  Pelvic pain:  Sitting; Deep penetration (rectal or vaginal)    Pain or difficulty with orgasms/erection/ejaculation:      State of menopause: Perimenopause (have not gone through menopause yet)  Hormone medications: No      Are you currently pregnant: No, Number of previous pregnancies: 2, Number of deliveries: 2, If you have delivered before, did you have any of these issues during delivery: Vaginal delivery, Have you been diagnosed with pelvic prolapse or abdominal separation: Yes (after 2nd birthz), Do you get regular exercise: Yes (getting back into routines within the past month), Have you tried pelvic floor strengthening exercises for 4 weeks: Yes, Do you have any history of trauma that is relevant to your care that you d like to share: No    Discussed reason for referral regarding pelvic health needs and external/internal pelvic floor muscle examination with patient/guardian.  Opportunity provided to ask questions and verbal consent for assessment and intervention was given.    Othro screen:   Deferred at this time.    PELVIC EXAM  External Visual Inspection:  At rest: Normal  With voluntary pelvic floor contraction: Perineal elevation  Relaxation of PFM: Yes  With intra-abdominal pressure: Cough: Perineal descent  Bearing down as defecation: Perineal descent    Integumentary:   Introitus: Unremarkable    External Digital Palpation per Perineum:   Ischiocavernosis: Unremarkable  Bulbo cavernosis: Unremarkable  Transverse perineal: Unremarkable  Levator ani: Unremarkable  Perineal body: Unremarkable    Scar:   Location/Type: Cyst removal scar  Mobility: Hypomobile    Internal Digital Palpation:  Per Vagina:  Tenderness  Myofascial Resistance to Palpation: Firm, Taut  Digital Muscle Performance: P (Power): 2+/5, inconsistent squeeze  E (Endurance): 5 seconds with slow decrease in activity through 10 seconds  F (Fast Twitch): not tested this date  Compensations:   Relaxation Post-Contraction: Normal    Fascial  Tension/Restriction: Hypomobile, to suprapubic tissues        Assessment & Plan   CLINICAL IMPRESSIONS  Medical Diagnosis: Urinary urgency.  Pelvic floor dysfunction.  Urinary, incontinence, stress female.  Recurrent UTI    Treatment Diagnosis: Pelvic floor weakness   Impression/Assessment: Patient is a 39 year old female with urinary incontinence, urinary urge, leakage, and recurrent UTIs complaints.  The following significant findings have been identified: Pain, Decreased strength, Impaired muscle performance, and Decreased activity tolerance. These impairments interfere with their ability to perform self care tasks, recreational activities, and community mobility as compared to previous level of function.     Clinical Decision Making (Complexity):  Clinical Presentation: Stable/Uncomplicated  Clinical Presentation Rationale: based on medical and personal factors listed in PT evaluation  Clinical Decision Making (Complexity): Low complexity    PLAN OF CARE  Treatment Interventions:  Modalities: Biofeedback, Ultrasound  Interventions: Manual Therapy, Neuromuscular Re-education, Therapeutic Activity, Therapeutic Exercise, Self-Care/Home Management    Long Term Goals     PT Goal 1  Goal Identifier: PFM endurance  Goal Description: Patient will hold pelvic floor contraction for at least 10 seconds with consistent hold as measured by biofeedback or internal palpation  Rationale: to maximize safety and independence with performance of ADLs and functional tasks  Target Date: 08/29/24  PT Goal 2  Goal Identifier: Functional pelvic floor  Goal Description: Patient will perform 10/10 jumps to simulate volleyball without leaking to reduce leakage with exercise  Rationale: to maximize safety and independence with performance of ADLs and functional tasks  Target Date: 08/29/24      Frequency of Treatment: 1x per week for 2 weeks, 1x per 2 weeks for 10 week adjusting frequency as indicated by patient presentation  Duration of  Treatment: 12 weeks    Recommended Referrals to Other Professionals:   Education Assessment:   Learner/Method: Patient    Risks and benefits of evaluation/treatment have been explained.   Patient/Family/caregiver agrees with Plan of Care.     Evaluation Time:     PT Eval, Low Complexity Minutes (11228): 35       Signing Clinician: Krystyna Morales PT

## 2024-06-14 ENCOUNTER — THERAPY VISIT (OUTPATIENT)
Dept: PHYSICAL THERAPY | Facility: CLINIC | Age: 39
End: 2024-06-14
Attending: ADVANCED PRACTICE MIDWIFE
Payer: COMMERCIAL

## 2024-06-14 DIAGNOSIS — R39.15 URINARY URGENCY: ICD-10-CM

## 2024-06-14 DIAGNOSIS — M62.89 PELVIC FLOOR DYSFUNCTION: Primary | ICD-10-CM

## 2024-06-14 DIAGNOSIS — N39.3 URINARY, INCONTINENCE, STRESS FEMALE: ICD-10-CM

## 2024-06-14 PROCEDURE — 97530 THERAPEUTIC ACTIVITIES: CPT | Mod: GP

## 2024-06-14 PROCEDURE — 97140 MANUAL THERAPY 1/> REGIONS: CPT | Mod: GP

## 2024-06-26 ENCOUNTER — PATIENT OUTREACH (OUTPATIENT)
Dept: CARE COORDINATION | Facility: CLINIC | Age: 39
End: 2024-06-26

## 2024-06-30 ENCOUNTER — LAB (OUTPATIENT)
Dept: LAB | Facility: CLINIC | Age: 39
End: 2024-06-30
Payer: COMMERCIAL

## 2024-06-30 DIAGNOSIS — E03.9 HYPOTHYROIDISM, UNSPECIFIED TYPE: ICD-10-CM

## 2024-06-30 LAB
T4 FREE SERPL-MCNC: 1.42 NG/DL (ref 0.9–1.7)
TSH SERPL DL<=0.005 MIU/L-ACNC: 0.87 UIU/ML (ref 0.3–4.2)

## 2024-06-30 PROCEDURE — 84439 ASSAY OF FREE THYROXINE: CPT

## 2024-06-30 PROCEDURE — 36415 COLL VENOUS BLD VENIPUNCTURE: CPT

## 2024-06-30 PROCEDURE — 84443 ASSAY THYROID STIM HORMONE: CPT

## 2024-07-03 DIAGNOSIS — E03.9 HYPOTHYROIDISM, UNSPECIFIED TYPE: ICD-10-CM

## 2024-07-03 RX ORDER — LEVOTHYROXINE SODIUM 112 UG/1
112 TABLET ORAL DAILY
Qty: 30 TABLET | Refills: 2 | Status: SHIPPED | OUTPATIENT
Start: 2024-07-03 | End: 2024-10-01

## 2024-07-31 DIAGNOSIS — Z78.9 USES BIRTH CONTROL: Primary | ICD-10-CM

## 2024-07-31 RX ORDER — ETONOGESTREL AND ETHINYL ESTRADIOL VAGINAL RING .015; .12 MG/D; MG/D
RING VAGINAL
Qty: 1 EACH | Refills: 0 | Status: SHIPPED | OUTPATIENT
Start: 2024-07-31

## 2024-08-01 ENCOUNTER — TELEPHONE (OUTPATIENT)
Dept: MIDWIFE SERVICES | Facility: CLINIC | Age: 39
End: 2024-08-01

## 2024-08-01 ENCOUNTER — THERAPY VISIT (OUTPATIENT)
Dept: PHYSICAL THERAPY | Facility: CLINIC | Age: 39
End: 2024-08-01
Payer: COMMERCIAL

## 2024-08-01 DIAGNOSIS — N39.3 URINARY, INCONTINENCE, STRESS FEMALE: ICD-10-CM

## 2024-08-01 DIAGNOSIS — M62.89 PELVIC FLOOR DYSFUNCTION: Primary | ICD-10-CM

## 2024-08-01 DIAGNOSIS — R39.15 URINARY URGENCY: ICD-10-CM

## 2024-08-01 PROCEDURE — 97110 THERAPEUTIC EXERCISES: CPT | Mod: GP

## 2024-08-01 NOTE — TELEPHONE ENCOUNTER
----- Message from Bee Buchanan sent at 7/31/2024  4:31 PM CDT -----  Regarding: Follow-up birth control  Rajjerry Patterson,  Here is the patient we spoke about yesterday.  I put in an order for 1 month new her ring.  Patient will need to be seen for an annual exam with Gloria Smith or other midwife before we give her a full prescription.  She has been on NuvaRing in the past and done well but I would like her to have her annual exam before prescribing further medication.  Thank you for giving her a call and letting her know,  Bee Buchanan

## 2024-08-01 NOTE — TELEPHONE ENCOUNTER
Telephone call to patient.  Advised that Rx for Nuva Ring has been refilled as a 1 month supply.  Discussed that patient is due for physical exam.  Patient verbalizes understanding to all and offers no further questions or concerns at this time.  Transferred to schedulers for assistance with making this appointment.

## 2024-08-19 DIAGNOSIS — N94.6 DYSMENORRHEA: Primary | ICD-10-CM

## 2024-08-19 RX ORDER — ETONOGESTREL AND ETHINYL ESTRADIOL VAGINAL RING .015; .12 MG/D; MG/D
RING VAGINAL
Qty: 3 EACH | Refills: 0 | Status: SHIPPED | OUTPATIENT
Start: 2024-08-19 | End: 2024-08-29

## 2024-08-29 ENCOUNTER — OFFICE VISIT (OUTPATIENT)
Dept: MIDWIFE SERVICES | Facility: CLINIC | Age: 39
End: 2024-08-29
Payer: COMMERCIAL

## 2024-08-29 VITALS
BODY MASS INDEX: 29.77 KG/M2 | WEIGHT: 201 LBS | HEIGHT: 69 IN | HEART RATE: 72 BPM | DIASTOLIC BLOOD PRESSURE: 84 MMHG | SYSTOLIC BLOOD PRESSURE: 130 MMHG

## 2024-08-29 DIAGNOSIS — Z01.419 WELL WOMAN EXAM: Primary | ICD-10-CM

## 2024-08-29 DIAGNOSIS — F41.9 ANXIETY: ICD-10-CM

## 2024-08-29 DIAGNOSIS — E03.9 HYPOTHYROIDISM, UNSPECIFIED TYPE: ICD-10-CM

## 2024-08-29 DIAGNOSIS — Z30.8 ENCOUNTER FOR OTHER CONTRACEPTIVE MANAGEMENT: ICD-10-CM

## 2024-08-29 DIAGNOSIS — N94.6 DYSMENORRHEA: ICD-10-CM

## 2024-08-29 DIAGNOSIS — F32.A DEPRESSION, UNSPECIFIED DEPRESSION TYPE: ICD-10-CM

## 2024-08-29 PROBLEM — N39.0 RECURRENT UTI: Status: RESOLVED | Noted: 2024-04-26 | Resolved: 2024-08-29

## 2024-08-29 PROCEDURE — 99395 PREV VISIT EST AGE 18-39: CPT | Performed by: ADVANCED PRACTICE MIDWIFE

## 2024-08-29 RX ORDER — CLOBETASOL PROPIONATE 0.5 MG/ML
SOLUTION TOPICAL
COMMUNITY
Start: 2024-08-09

## 2024-08-29 RX ORDER — KETOCONAZOLE 20 MG/ML
SHAMPOO TOPICAL
COMMUNITY
Start: 2024-08-09

## 2024-08-29 RX ORDER — TRIAMCINOLONE ACETONIDE 1 MG/G
OINTMENT TOPICAL PRN
COMMUNITY

## 2024-08-29 RX ORDER — DOXYCYCLINE 100 MG/1
CAPSULE ORAL
COMMUNITY
Start: 2024-08-09 | End: 2024-08-29

## 2024-08-29 RX ORDER — ETONOGESTREL AND ETHINYL ESTRADIOL VAGINAL RING .015; .12 MG/D; MG/D
RING VAGINAL
Qty: 3 EACH | Refills: 3 | Status: SHIPPED | OUTPATIENT
Start: 2024-08-29

## 2024-08-29 ASSESSMENT — ANXIETY QUESTIONNAIRES
IF YOU CHECKED OFF ANY PROBLEMS ON THIS QUESTIONNAIRE, HOW DIFFICULT HAVE THESE PROBLEMS MADE IT FOR YOU TO DO YOUR WORK, TAKE CARE OF THINGS AT HOME, OR GET ALONG WITH OTHER PEOPLE: SOMEWHAT DIFFICULT
2. NOT BEING ABLE TO STOP OR CONTROL WORRYING: NOT AT ALL
GAD7 TOTAL SCORE: 5
3. WORRYING TOO MUCH ABOUT DIFFERENT THINGS: NOT AT ALL
8. IF YOU CHECKED OFF ANY PROBLEMS, HOW DIFFICULT HAVE THESE MADE IT FOR YOU TO DO YOUR WORK, TAKE CARE OF THINGS AT HOME, OR GET ALONG WITH OTHER PEOPLE?: SOMEWHAT DIFFICULT
1. FEELING NERVOUS, ANXIOUS, OR ON EDGE: NOT AT ALL
6. BECOMING EASILY ANNOYED OR IRRITABLE: NEARLY EVERY DAY
GAD7 TOTAL SCORE: 5
5. BEING SO RESTLESS THAT IT IS HARD TO SIT STILL: SEVERAL DAYS
4. TROUBLE RELAXING: NOT AT ALL
GAD7 TOTAL SCORE: 5
7. FEELING AFRAID AS IF SOMETHING AWFUL MIGHT HAPPEN: SEVERAL DAYS
7. FEELING AFRAID AS IF SOMETHING AWFUL MIGHT HAPPEN: SEVERAL DAYS

## 2024-08-29 ASSESSMENT — PATIENT HEALTH QUESTIONNAIRE - PHQ9
10. IF YOU CHECKED OFF ANY PROBLEMS, HOW DIFFICULT HAVE THESE PROBLEMS MADE IT FOR YOU TO DO YOUR WORK, TAKE CARE OF THINGS AT HOME, OR GET ALONG WITH OTHER PEOPLE: VERY DIFFICULT
SUM OF ALL RESPONSES TO PHQ QUESTIONS 1-9: 10
SUM OF ALL RESPONSES TO PHQ QUESTIONS 1-9: 10

## 2024-08-29 NOTE — PROGRESS NOTES
"Assessment:   1.  Well woman exam  2.  Hypothyroidism managed by may have a endocrinology  3.  Contraceptive management-vasectomy and maneuver ringer  4.  MDD and LG currently unmedicated, undergoing counseling/therapy  5.  Positive depression screen     Plan:      1. Discussed nutrition and exercise.  Advised MVI, Vitamin D3 4000IU geltab and an omega 3 supplement daily   2. Blood tests: FASTING future labs: Lipid panel, hemoglobin A1c and vitamin D level.  3. Breast self exam technique reviewed and patient encouraged to perform self-exam monthly.   4. Contraception: Vasectomy and NuvaRing which was refilled today.  Follow-up in 3 months or sooner.    5.  Next pap due 7/22/2025. HPV co-testing discussed with that pap, then paps q 5 yrs if both negative.  6.  Appointment with psychiatrist this evening at 6 PM.  7.  RTC 1 year for annual physical exam, PRN    Subjective:   Divina Goyal is a 39 year old female who presents for an annual exam.   Laid off from her job last month.  She self DC'd her Lexapro against medical advice.  She sought out counseling/therapy and continues with this.  LG-7: 5, \"somewhat difficult.\"  And PHQ-9: 10.  Followed by endocrinology for hypothyroidism.  Started on NuvaRing to help dysmenorrhea, PMS symptoms and vaginal dryness.  She denies contraindications.    Healthy Habits:   Regular Exercise: Yes   Sunscreen Use: Yes  Healthy Diet: Yes  Dental Visits Regularly: Yes  Seat Belt: Yes  Sexually active: Yes  STI risk No  domestic violence No    Self Breast Exam Monthly:Yes  Colonoscopy: No  Lipid Profile: Yes  Glucose Screen: Yes  Prevention of Osteoporosis: Yes  Last Dexa: N/A      Immunization History   Administered Date(s) Administered    COVID-19 MONOVALENT 12+ (Pfizer) 03/31/2021, 04/21/2021, 11/20/2021    Influenza Vaccine >6 months,quad, PF 09/05/2019, 10/21/2021    Influenza,INJ,MDCK,PF,Quad >6mo(Flucelvax) 01/24/2023    TDAP (Adacel,Boostrix) 08/12/2008, 03/12/2018, 08/07/2019, " 2021     Immunization status: up to date and documented.    Gynecologic History  Patient's last menstrual period was 2024.  Contraception: Nuva ring and vasectomy--current partner    Cancer screening:   Last Pap: 2020. Results were: Normal/negative HPV      OB History    Para Term  AB Living   2 2 2 0 0 2   SAB IAB Ectopic Multiple Live Births   0 0 0 0 2      # Outcome Date GA Lbr Loyd/2nd Weight Sex Type Anes PTL Lv   2 Term 21 40w6d 06:59 / 00:19 4.14 kg (9 lb 2 oz) M Vag-Spont Local, EPI N LUIS ANGEL      Complications: Intraamniotic Infection      Name: Hsu      Apgar1: 7  Apgar5: 9   1 Term 19 39w4d / 01:25 3.232 kg (7 lb 2 oz) M Vag-Spont EPI, Nitrous, IV N LUIS ANGEL      Name: Cody      Apgar1: 9  Apgar5: 9       Current Outpatient Medications   Medication Sig Dispense Refill    clindamycin (CLEOCIN T) 1 % external lotion APPLY TO AFFECTED AREAS ON GROIN ONCE DAILY.      clobetasol (TEMOVATE) 0.05 % external solution APPLY A THIN LAYER TO ENTIRE SCALP NIGHTLY FOR UP TO TWO WEEKS AT A TIME. TAKE TWO WEEKS OFF. REPEAT AS NEEDED FOR FLARES.      etonogestrel-ethinyl estradiol (NUVARING) 0.12-0.015 MG/24HR vaginal ring Insert one (1) ring vaginally and leave in place for 3 consecutive weeks (21 days), then remove for 1 week. 3 each 3    ibuprofen (ADVIL/MOTRIN) 600 MG tablet Take 1 tablet (600 mg) by mouth every 6 hours as needed for moderate pain 30 tablet 1    ketoconazole (NIZORAL) 2 % external shampoo WASH SCALP 2-3X WEEKLY. LATHER AND LET SIT A FEW MINUTES BEFORE RINSING.      levothyroxine (SYNTHROID/LEVOTHROID) 112 MCG tablet Take 1 tablet (112 mcg) by mouth daily 30 tablet 2    metroNIDAZOLE (METROCREAM) 0.75 % external cream       SULFACLEANSE 8/4 8-4 % SUSP USE TO WASH AFFECTED AREAS ONCE DAILY IN THE SHOWER.LATHER & LET SIT FOR 3 TO 5 MINUTES BEFORE RINSING.      tretinoin (RETIN-A) 0.05 % external cream APPLY A PEA-SIZED AMOUNT TO ENTIRE FACE EVERY OTHER NIGHT  INCREASING TO NIGHTLY AS TOLERATED MOISTURIZE AFTER      triamcinolone (KENALOG) 0.1 % external ointment as needed.      doxycycline hyclate (VIBRAMYCIN) 100 MG capsule TAKE 1 CAPSULE BY MOUTH 2 TIMES PER DAY WITH FOOD & WATER. SUPPLEMENT WITH A PROBIOTIC (Patient not taking: Reported on 8/29/2024)      escitalopram (LEXAPRO) 20 MG tablet Take 1 tablet (20 mg) by mouth daily (Patient not taking: Reported on 8/29/2024) 30 tablet 11    etonogestrel-ethinyl estradiol (NUVARING) 0.12-0.015 MG/24HR vaginal ring Insert one (1) ring vaginally and leave in place for 3 consecutive weeks (21 days), then remove for 1 week. 1 each 0    levothyroxine (SYNTHROID/LEVOTHROID) 125 MCG tablet Take 1 tablet (125 mcg) by mouth daily (Patient not taking: Reported on 8/29/2024) 90 tablet 1     Past Medical History:   Diagnosis Date    Abnormal Pap smear of cervix     Chickenpox     Headache     HPV (human papilloma virus) infection     Hypothyroidism     Perineal laceration during delivery 11/01/2019    second degree    Preeclampsia     delivered 11/1/19    Seasonal allergies     Teratoma of ovary 04/10/2019    bilateral    Teratoma of ovary 04/09/2019    Bilateral, surgically removed during pregnancy    Thyroid disease     UTI (urinary tract infection)      Past Surgical History:   Procedure Laterality Date    APPENDECTOMY      APPENDECTOMY      as a child    GYN SURGERY      MOUTH SURGERY      WI HYSTEROSCOPY,W/ENDO BX N/A 11/15/2019    Procedure: HYSTEROSCOPY, ULTRASOUND GUIDED DILATION AND CURETTAGE;  Surgeon: Alessandro Barber MD;  Location: [facility];  Service: Gynecology    WI LAP,FULGURATE/EXCISE LESIONS N/A 11/15/2019    Procedure: LAPAROSCOPY, REMOVAL OF LEFT OVARIAN CYSTECTOMY, HYSTEROSCOPY , WITH ULTRASOUND;  Surgeon: Alessandro Barber MD;  Location: [facility];  Service: Gynecology    TERATOMA EXCISION Bilateral 04/09/2019    Ovaries     Patient has no known allergies.  Family History   Problem Relation Age  "of Onset    Alcoholism Mother     Substance Abuse Mother     Early Death Mother     Mental Illness Mother     Cerebrovascular Disease Mother     Alcoholism Father     Arthritis Father     Hypertension Father     Alcoholism Brother     Substance Abuse Maternal Grandmother     Substance Abuse Maternal Grandfather     Cancer Maternal Grandfather     Hypertension Paternal Grandfather     Arthritis Paternal Aunt     Substance Abuse Paternal Uncle      Social History     Socioeconomic History    Marital status:      Spouse name: Derik    Number of children: 2    Years of education: Not on file    Highest education level: Not on file   Occupational History    Not on file   Tobacco Use    Smoking status: Former     Types: Cigarettes    Smokeless tobacco: Never    Tobacco comments:     quit at age 19   Vaping Use    Vaping status: Never Used   Substance and Sexual Activity    Alcohol use: Yes    Drug use: Not Currently     Types: Marijuana    Sexual activity: Yes     Partners: Male   Other Topics Concern    Not on file   Social History Narrative    . Has 4 month old son \"Cody\".  Works part time at [employer].     Social Determinants of Health     Financial Resource Strain: Not on file   Food Insecurity: Not on file   Transportation Needs: Not on file   Physical Activity: Not on file   Stress: Not on file   Social Connections: Unknown (6/27/2022)    [facility]    Social Connections     Frequency of Communication with Friends and Family: Not on file   Interpersonal Safety: Low Risk  (8/29/2024)    Interpersonal Safety     Do you feel physically and emotionally safe where you currently live?: Yes     Within the past 12 months, have you been hit, slapped, kicked or otherwise physically hurt by someone?: No     Within the past 12 months, have you been humiliated or emotionally abused in other ways by your partner or " "ex-partner?: No   Housing Stability: Not on file       Review of Systems  General:  Denies problem  Eyes: Denies problem  Ears/Nose/Throat: Denies problem  Cardiovascular: Denies problem  Respiratory:  Denies problem  Gastrointestinal:  denies problems  Genitourinary: denies problems  Musculoskeletal:  Denies problem  Skin: Denies problem  Neurologic:denies problems  Psychiatric: Depression screen: PHQ-9: 10  Endocrine: denies problems        Objective:      Vitals:    08/29/24 1324   BP: 130/84   Pulse: 72   Weight: 91.2 kg (201 lb)   Height: 1.74 m (5' 8.5\")       Physical Exam:  General Appearance: Alert, cooperative, no distress, appears stated age  Skin: Skin color, texture, turgor normal, no rashes or lesions  Throat: Lips, mucosa, and tongue normal; teeth and gums normal  HEENT: grossly normal; otoscopic and opthalmic exam not performed.   Neck: Supple, symmetrical, trachea midline, no adenopathy;  thyroid: not enlarged, symmetric, no tenderness/mass/nodules  Lungs: Clear to auscultation bilaterally, respirations unlabored  Breasts: Deferred  Heart: Regular rate and rhythm, S1 and S2 normal, no murmur  Abdomen: Soft, non-tender.  Pelvic: Declined  Answers submitted by the patient for this visit:  Patient Health Questionnaire (Submitted on 8/29/2024)  If you checked off any problems, how difficult have these problems made it for you to do your work, take care of things at home, or get along with other people?: Very difficult  PHQ9 TOTAL SCORE: 10  Patient Health Questionnaire (G7) (Submitted on 8/29/2024)  LG 7 TOTAL SCORE: 5    "

## 2024-09-11 ENCOUNTER — LAB (OUTPATIENT)
Dept: LAB | Facility: CLINIC | Age: 39
End: 2024-09-11
Attending: ADVANCED PRACTICE MIDWIFE
Payer: COMMERCIAL

## 2024-09-11 DIAGNOSIS — Z01.419 WELL WOMAN EXAM: ICD-10-CM

## 2024-09-11 LAB
CHOLEST SERPL-MCNC: 195 MG/DL
FASTING STATUS PATIENT QL REPORTED: YES
HBA1C MFR BLD: 5 % (ref 0–5.6)
HDLC SERPL-MCNC: 71 MG/DL
LDLC SERPL CALC-MCNC: 101 MG/DL
NONHDLC SERPL-MCNC: 124 MG/DL
TRIGL SERPL-MCNC: 115 MG/DL
VIT D+METAB SERPL-MCNC: 37 NG/ML (ref 20–50)

## 2024-09-11 PROCEDURE — 80061 LIPID PANEL: CPT

## 2024-09-11 PROCEDURE — 83036 HEMOGLOBIN GLYCOSYLATED A1C: CPT

## 2024-09-11 PROCEDURE — 36415 COLL VENOUS BLD VENIPUNCTURE: CPT

## 2024-09-11 PROCEDURE — 82306 VITAMIN D 25 HYDROXY: CPT

## 2024-10-01 ENCOUNTER — VIRTUAL VISIT (OUTPATIENT)
Dept: ENDOCRINOLOGY | Facility: CLINIC | Age: 39
End: 2024-10-01
Attending: ADVANCED PRACTICE MIDWIFE
Payer: COMMERCIAL

## 2024-10-01 DIAGNOSIS — E03.9 HYPOTHYROIDISM, UNSPECIFIED TYPE: ICD-10-CM

## 2024-10-01 PROCEDURE — 99214 OFFICE O/P EST MOD 30 MIN: CPT | Mod: 95 | Performed by: NURSE PRACTITIONER

## 2024-10-01 RX ORDER — LEVOTHYROXINE SODIUM 112 UG/1
112 TABLET ORAL DAILY
Qty: 90 TABLET | Refills: 3 | Status: SHIPPED | OUTPATIENT
Start: 2024-10-01

## 2024-10-01 RX ORDER — DEXTROAMPHETAMINE SACCHARATE, AMPHETAMINE ASPARTATE MONOHYDRATE, DEXTROAMPHETAMINE SULFATE AND AMPHETAMINE SULFATE 5; 5; 5; 5 MG/1; MG/1; MG/1; MG/1
CAPSULE, EXTENDED RELEASE ORAL
COMMUNITY
Start: 2024-09-24

## 2024-10-01 RX ORDER — DEXTROAMPHETAMINE SACCHARATE, AMPHETAMINE ASPARTATE MONOHYDRATE, DEXTROAMPHETAMINE SULFATE AND AMPHETAMINE SULFATE 2.5; 2.5; 2.5; 2.5 MG/1; MG/1; MG/1; MG/1
CAPSULE, EXTENDED RELEASE ORAL
COMMUNITY
Start: 2024-09-10

## 2024-10-01 NOTE — LETTER
10/1/2024      Divina MORALES See  [address]  [place] [address]      Dear Colleague,    Thank you for referring your patient, Divina Goyal, to the [facility]. Please see a copy of my visit note below.    [facility] ENDOCRINOLOGY    Thyroid Note  10/1/2024    Divina Goyal, 1985, [id number]          Reason for visit      1. Hypothyroidism, unspecified type        HPI     Divnia Goyal is a very pleasant 39 year old old female who presents for follow up.  SUMMARY:    Divina is seen via Video visit in follow-up for Hypothyroidism. [facility] and has returned to [facility]. Her last set of Thyroid labs were done in June. TSH was 0.87, fT4 was 1.42. She is currently taking 112 mcg of Levothyroxine daily. She is having no problems referable to her neck. She is fatigued, however she has a 5 year old and a 3 year old. I would not attribute the fatigue to her TSH level, and more her small children. Her Vit D level is  normal at 37. She is currently getting 1000 mcg daily. She continues to be interested in weight loss, and has been somewhat successful.     Past Medical History     Patient Active Problem List   Diagnosis     Teratoma of ovary     Hypothyroidism     Hidradenitis suppurativa     Cervical high risk HPV (human papillomavirus) test positive     Atypical squamous cells of undetermined significance (ASCUS) on Papanicolaou smear of cervix     Dermoid cyst of both ovaries     Pap smear for cervical cancer screening     Environmental allergies     Hammer toe     Encounter for other contraceptive management     Pelvic floor dysfunction     Anxiety     Depression     Insomnia     Dysmenorrhea     Restlessness     Urinary urgency     Urinary, incontinence, stress female       Family History       family history includes Alcoholism in her brother, father, and mother; Arthritis in her father and paternal aunt; Cancer in her maternal  grandfather; Cerebrovascular Disease in her mother; Early Death in her mother; Hypertension in her father and paternal grandfather; Mental Illness in her mother; Substance Abuse in her maternal grandfather, maternal grandmother, mother, and paternal uncle.    Social History      reports that she has quit smoking. Her smoking use included cigarettes. She has never used smokeless tobacco. She reports current alcohol use. She reports that she does not currently use drugs after having used the following drugs: Marijuana.      Review of Systems     Patient denies fatigue, weight changes, heat/cold intolerance, bowel/skin changes or CVS symptoms.   Remainder per HPI and per attached intake form.      Vital Signs     LMP 08/27/2024   Wt Readings from Last 3 Encounters:   08/29/24 91.2 kg (201 lb)   04/26/24 95.1 kg (209 lb 11.2 oz)   03/03/24 98.1 kg (216 lb 3.2 oz)       Physical Exam     Constitutional:  Well developed, Well nourished  HENT:  Normocephalic,   Neck: normal in appearance  Eyes:  PERRL, Conjunctiva pink  Respiratory:  No respiratory distress  Skin: No acanthosis nigricans, lipoatrophy or lipodystrophy  Neurologic:  Alert & oriented x 3, nonfocal  Psychiatric:  Affect, Mood, Insight appropriate        Assessment     1. Hypothyroidism, unspecified type            Plan     Keeping her TSH below 1.0 will help keep her body more amenable to weight loss. She has started walking again intentionally, and this is a great place to start. I also suggested that she increase her Vit D intake to 2000 international unit(s) daily. Encouraged to reach out between visits with any problems or concerns. I will see her back in 6 months.         Susy Carranza NP  [facility]  10/1/2024  2:28 PM          This note has been dictated using voice recognition software.  Any grammatical or context distortions are unintentional and inherent to the software.     Lab Results     TSH   Date Value Ref Range Status   06/30/2024 0.87  "0.30 - 4.20 uIU/mL Final   06/08/2022 2.24 0.30 - 5.00 uIU/mL Final     T3 Free   Date Value Ref Range Status   10/10/2019 3.1 1.9 - 3.9 pg/mL Final     No components found for: \"THYROIDAB\"    No results found for: \"E2NMXIK\"    Imaging Results   Last thyroid ultrasound:  No results found for this or any previous visit.      Last thyroid nuclear scan:  No results found for this or any previous visit.      Current Medications     Outpatient Medications Prior to Visit   Medication Sig Dispense Refill     amphetamine-dextroamphetamine (ADDERALL XR) 10 MG 24 hr capsule        amphetamine-dextroamphetamine (ADDERALL XR) 20 MG 24 hr capsule        clindamycin (CLEOCIN T) 1 % external lotion APPLY TO AFFECTED AREAS ON GROIN ONCE DAILY.       clobetasol (TEMOVATE) 0.05 % external solution APPLY A THIN LAYER TO ENTIRE SCALP NIGHTLY FOR UP TO TWO WEEKS AT A TIME. TAKE TWO WEEKS OFF. REPEAT AS NEEDED FOR FLARES.       etonogestrel-ethinyl estradiol (NUVARING) 0.12-0.015 MG/24HR vaginal ring Insert one (1) ring vaginally and leave in place for 3 consecutive weeks (21 days), then remove for 1 week. 3 each 3     etonogestrel-ethinyl estradiol (NUVARING) 0.12-0.015 MG/24HR vaginal ring Insert one (1) ring vaginally and leave in place for 3 consecutive weeks (21 days), then remove for 1 week. 1 each 0     ibuprofen (ADVIL/MOTRIN) 600 MG tablet Take 1 tablet (600 mg) by mouth every 6 hours as needed for moderate pain 30 tablet 1     ketoconazole (NIZORAL) 2 % external shampoo WASH SCALP 2-3X WEEKLY. LATHER AND LET SIT A FEW MINUTES BEFORE RINSING.       levothyroxine (SYNTHROID/LEVOTHROID) 112 MCG tablet Take 1 tablet (112 mcg) by mouth daily 30 tablet 2     metroNIDAZOLE (METROCREAM) 0.75 % external cream        SULFACLEANSE 8/4 8-4 % SUSP USE TO WASH AFFECTED AREAS ONCE DAILY IN THE SHOWER.LATHER & LET SIT FOR 3 TO 5 MINUTES BEFORE RINSING.       tretinoin (RETIN-A) 0.05 % external cream APPLY A PEA-SIZED AMOUNT TO ENTIRE FACE " "EVERY OTHER NIGHT INCREASING TO NIGHTLY AS TOLERATED MOISTURIZE AFTER       triamcinolone (KENALOG) 0.1 % external ointment as needed.       No facility-administered medications prior to visit.     Visit start time:    Visit stop time:     Virtual Visit Details    Type of service:  Video Visit     Originating Location (pt. Location): {video visit patient location:799707::\"Home\"}  {PROVIDER LOCATION On-site should be selected for visits conducted from your clinic location or adjoining [facility] hospital, academic office, or other nearby [facility] building. Off-site should be selected for all other provider locations, including home:[id number]}  Distant Location (provider location):  {virtual location provider:136314}  Platform used for Video Visit: {Virtual Visit Platforms:070531::\"[employer]\"}      Again, thank you for allowing me to participate in the care of your patient.        Sincerely,        Susy Carranza NP  "

## 2024-10-01 NOTE — PROGRESS NOTES
[facility] ENDOCRINOLOGY    Thyroid Note  10/1/2024    Divina Goyal, 1985, [id number]          Reason for visit      1. Hypothyroidism, unspecified type        HPI     Divina Goyal is a very pleasant 39 year old old female who presents for follow up.  SUMMARY:    Divina is seen via Video visit in follow-up for Hypothyroidism. [facility] with [facility] two years and has returned to [facility]. Her last set of Thyroid labs were done in June. TSH was 0.87, fT4 was 1.42. She is currently taking 112 mcg of Levothyroxine daily. She is having no problems referable to her neck. She is fatigued, however she has a 5 year old and a 3 year old. I would not attribute the fatigue to her TSH level, and more her small children. Her Vit D level is  normal at 37. She is currently getting 1000 mcg daily. She continues to be interested in weight loss, and has been somewhat successful.     Past Medical History     Patient Active Problem List   Diagnosis    Teratoma of ovary    Hypothyroidism    Hidradenitis suppurativa    Cervical high risk HPV (human papillomavirus) test positive    Atypical squamous cells of undetermined significance (ASCUS) on Papanicolaou smear of cervix    Dermoid cyst of both ovaries    Pap smear for cervical cancer screening    Environmental allergies    Hammer toe    Encounter for other contraceptive management    Pelvic floor dysfunction    Anxiety    Depression    Insomnia    Dysmenorrhea    Restlessness    Urinary urgency    Urinary, incontinence, stress female       Family History       family history includes Alcoholism in her brother, father, and mother; Arthritis in her father and paternal aunt; Cancer in her maternal grandfather; Cerebrovascular Disease in her mother; Early Death in her mother; Hypertension in her father and paternal grandfather; Mental Illness in her mother; Substance Abuse in her maternal grandfather, maternal grandmother, mother, and paternal  "uncle.    Social History      reports that she has quit smoking. Her smoking use included cigarettes. She has never used smokeless tobacco. She reports current alcohol use. She reports that she does not currently use drugs after having used the following drugs: Marijuana.      Review of Systems     Patient denies fatigue, weight changes, heat/cold intolerance, bowel/skin changes or CVS symptoms.   Remainder per HPI and per attached intake form.      Vital Signs     LMP 08/27/2024   Wt Readings from Last 3 Encounters:   08/29/24 91.2 kg (201 lb)   04/26/24 95.1 kg (209 lb 11.2 oz)   03/03/24 98.1 kg (216 lb 3.2 oz)       Physical Exam     Constitutional:  Well developed, Well nourished  HENT:  Normocephalic,   Neck: normal in appearance  Eyes:  PERRL, Conjunctiva pink  Respiratory:  No respiratory distress  Skin: No acanthosis nigricans, lipoatrophy or lipodystrophy  Neurologic:  Alert & oriented x 3, nonfocal  Psychiatric:  Affect, Mood, Insight appropriate        Assessment     1. Hypothyroidism, unspecified type            Plan     Keeping her TSH below 1.0 will help keep her body more amenable to weight loss. She has started walking again intentionally, and this is a great place to start. I also suggested that she increase her Vit D intake to 2000 international unit(s) daily. Encouraged to reach out between visits with any problems or concerns. I will see her back in 6 months.         Susy Carranza NP   Endocrinology  10/1/2024  2:28 PM       Lab Results     TSH   Date Value Ref Range Status   06/30/2024 0.87 0.30 - 4.20 uIU/mL Final   06/08/2022 2.24 0.30 - 5.00 uIU/mL Final     T3 Free   Date Value Ref Range Status   10/10/2019 3.1 1.9 - 3.9 pg/mL Final     No components found for: \"THYROIDAB\"    No results found for: \"D2QILVF\"    Imaging Results   Last thyroid ultrasound:  No results found for this or any previous visit.      Last thyroid nuclear scan:  No results found for this or any previous " visit.      Current Medications     Outpatient Medications Prior to Visit   Medication Sig Dispense Refill    amphetamine-dextroamphetamine (ADDERALL XR) 10 MG 24 hr capsule       amphetamine-dextroamphetamine (ADDERALL XR) 20 MG 24 hr capsule       clindamycin (CLEOCIN T) 1 % external lotion APPLY TO AFFECTED AREAS ON GROIN ONCE DAILY.      clobetasol (TEMOVATE) 0.05 % external solution APPLY A THIN LAYER TO ENTIRE SCALP NIGHTLY FOR UP TO TWO WEEKS AT A TIME. TAKE TWO WEEKS OFF. REPEAT AS NEEDED FOR FLARES.      etonogestrel-ethinyl estradiol (NUVARING) 0.12-0.015 MG/24HR vaginal ring Insert one (1) ring vaginally and leave in place for 3 consecutive weeks (21 days), then remove for 1 week. 3 each 3    etonogestrel-ethinyl estradiol (NUVARING) 0.12-0.015 MG/24HR vaginal ring Insert one (1) ring vaginally and leave in place for 3 consecutive weeks (21 days), then remove for 1 week. 1 each 0    ibuprofen (ADVIL/MOTRIN) 600 MG tablet Take 1 tablet (600 mg) by mouth every 6 hours as needed for moderate pain 30 tablet 1    ketoconazole (NIZORAL) 2 % external shampoo WASH SCALP 2-3X WEEKLY. LATHER AND LET SIT A FEW MINUTES BEFORE RINSING.      levothyroxine (SYNTHROID/LEVOTHROID) 112 MCG tablet Take 1 tablet (112 mcg) by mouth daily 30 tablet 2    metroNIDAZOLE (METROCREAM) 0.75 % external cream       SULFACLEANSE 8/4 8-4 % SUSP USE TO WASH AFFECTED AREAS ONCE DAILY IN THE SHOWER.LATHER & LET SIT FOR 3 TO 5 MINUTES BEFORE RINSING.      tretinoin (RETIN-A) 0.05 % external cream APPLY A PEA-SIZED AMOUNT TO ENTIRE FACE EVERY OTHER NIGHT INCREASING TO NIGHTLY AS TOLERATED MOISTURIZE AFTER      triamcinolone (KENALOG) 0.1 % external ointment as needed.       No facility-administered medications prior to visit.     Visit start time: 1430    Visit stop time: 1450    Virtual Visit Details    Type of service:  Video Visit     Originating Location (pt. Location): Home    Distant Location (provider location):  On-site  Platform  used for Video Visit: Kami

## 2024-12-09 ENCOUNTER — PATIENT OUTREACH (OUTPATIENT)
Dept: CARE COORDINATION | Facility: CLINIC | Age: 39
End: 2024-12-09
Payer: COMMERCIAL

## 2024-12-10 ENCOUNTER — TRANSFERRED RECORDS (OUTPATIENT)
Dept: HEALTH INFORMATION MANAGEMENT | Facility: CLINIC | Age: 39
End: 2024-12-10
Payer: COMMERCIAL

## 2024-12-11 ENCOUNTER — PATIENT OUTREACH (OUTPATIENT)
Dept: CARE COORDINATION | Facility: CLINIC | Age: 39
End: 2024-12-11
Payer: COMMERCIAL

## 2024-12-16 ENCOUNTER — HOSPITAL ENCOUNTER (OUTPATIENT)
Dept: CT IMAGING | Facility: HOSPITAL | Age: 39
Discharge: HOME | End: 2024-12-16
Payer: COMMERCIAL

## 2024-12-16 DIAGNOSIS — Z87.898 HISTORY OF SOLITARY PULMONARY NODULE: ICD-10-CM

## 2024-12-16 PROCEDURE — 71250 CT THORAX DX C-: CPT

## 2024-12-30 ENCOUNTER — TRANSFERRED RECORDS (OUTPATIENT)
Dept: HEALTH INFORMATION MANAGEMENT | Facility: CLINIC | Age: 39
End: 2024-12-30
Payer: COMMERCIAL

## 2025-07-30 ENCOUNTER — PATIENT OUTREACH (OUTPATIENT)
Dept: CARE COORDINATION | Facility: CLINIC | Age: 40
End: 2025-07-30
Payer: COMMERCIAL

## 2025-08-13 ENCOUNTER — PATIENT OUTREACH (OUTPATIENT)
Dept: CARE COORDINATION | Facility: CLINIC | Age: 40
End: 2025-08-13
Payer: COMMERCIAL